# Patient Record
Sex: MALE | Race: BLACK OR AFRICAN AMERICAN | NOT HISPANIC OR LATINO | Employment: FULL TIME | URBAN - METROPOLITAN AREA
[De-identification: names, ages, dates, MRNs, and addresses within clinical notes are randomized per-mention and may not be internally consistent; named-entity substitution may affect disease eponyms.]

---

## 2020-01-11 ENCOUNTER — HOSPITAL ENCOUNTER (INPATIENT)
Facility: CLINIC | Age: 55
LOS: 4 days | Discharge: NURSING, REHAB OR LONG-TERM CARE | DRG: 064 | End: 2020-01-15
Attending: EMERGENCY MEDICINE | Admitting: HOSPITALIST
Payer: COMMERCIAL

## 2020-01-11 ENCOUNTER — APPOINTMENT (OUTPATIENT)
Dept: CT IMAGING | Facility: CLINIC | Age: 55
DRG: 064 | End: 2020-01-11
Attending: PHYSICIAN ASSISTANT
Payer: COMMERCIAL

## 2020-01-11 ENCOUNTER — APPOINTMENT (OUTPATIENT)
Dept: MRI IMAGING | Facility: CLINIC | Age: 55
DRG: 064 | End: 2020-01-11
Attending: PHYSICIAN ASSISTANT
Payer: COMMERCIAL

## 2020-01-11 ENCOUNTER — APPOINTMENT (OUTPATIENT)
Dept: GENERAL RADIOLOGY | Facility: CLINIC | Age: 55
DRG: 064 | End: 2020-01-11
Attending: HOSPITALIST
Payer: COMMERCIAL

## 2020-01-11 DIAGNOSIS — S06.320A INTRAPARENCHYMAL HEMATOMA OF BRAIN, LEFT, WITHOUT LOSS OF CONSCIOUSNESS, INITIAL ENCOUNTER (H): ICD-10-CM

## 2020-01-11 DIAGNOSIS — I61.8 OTHER NONTRAUMATIC INTRACEREBRAL HEMORRHAGE, UNSPECIFIED LATERALITY (H): Primary | ICD-10-CM

## 2020-01-11 PROBLEM — I61.9 ICH (INTRACEREBRAL HEMORRHAGE) (H): Status: ACTIVE | Noted: 2020-01-11

## 2020-01-11 LAB
ALBUMIN SERPL-MCNC: 4.4 G/DL (ref 3.4–5)
ALBUMIN UR-MCNC: NEGATIVE MG/DL
ALP SERPL-CCNC: 67 U/L (ref 40–150)
ALT SERPL W P-5'-P-CCNC: 41 U/L (ref 0–70)
ANION GAP SERPL CALCULATED.3IONS-SCNC: 2 MMOL/L (ref 3–14)
APPEARANCE UR: CLEAR
APTT PPP: 26 SEC (ref 22–37)
AST SERPL W P-5'-P-CCNC: 27 U/L (ref 0–45)
BASOPHILS # BLD AUTO: 0 10E9/L (ref 0–0.2)
BASOPHILS NFR BLD AUTO: 0.2 %
BILIRUB DIRECT SERPL-MCNC: 0.1 MG/DL (ref 0–0.2)
BILIRUB SERPL-MCNC: 0.5 MG/DL (ref 0.2–1.3)
BILIRUB UR QL STRIP: NEGATIVE
BUN SERPL-MCNC: 14 MG/DL (ref 7–30)
CALCIUM SERPL-MCNC: 9.2 MG/DL (ref 8.5–10.1)
CHLORIDE SERPL-SCNC: 104 MMOL/L (ref 94–109)
CO2 SERPL-SCNC: 33 MMOL/L (ref 20–32)
COLOR UR AUTO: ABNORMAL
CREAT SERPL-MCNC: 1.02 MG/DL (ref 0.66–1.25)
DIFFERENTIAL METHOD BLD: ABNORMAL
EOSINOPHIL # BLD AUTO: 0.1 10E9/L (ref 0–0.7)
EOSINOPHIL NFR BLD AUTO: 1.8 %
ERYTHROCYTE [DISTWIDTH] IN BLOOD BY AUTOMATED COUNT: 14.5 % (ref 10–15)
GFR SERPL CREATININE-BSD FRML MDRD: 82 ML/MIN/{1.73_M2}
GLUCOSE BLDC GLUCOMTR-MCNC: 106 MG/DL (ref 70–99)
GLUCOSE BLDC GLUCOMTR-MCNC: 152 MG/DL (ref 70–99)
GLUCOSE SERPL-MCNC: 107 MG/DL (ref 70–99)
GLUCOSE UR STRIP-MCNC: NEGATIVE MG/DL
HBA1C MFR BLD: 6 % (ref 0–5.6)
HCT VFR BLD AUTO: 48.6 % (ref 40–53)
HGB BLD-MCNC: 15.2 G/DL (ref 13.3–17.7)
HGB UR QL STRIP: NEGATIVE
IMM GRANULOCYTES # BLD: 0 10E9/L (ref 0–0.4)
IMM GRANULOCYTES NFR BLD: 0.2 %
INR PPP: 0.98 (ref 0.86–1.14)
KETONES UR STRIP-MCNC: NEGATIVE MG/DL
LEUKOCYTE ESTERASE UR QL STRIP: NEGATIVE
LYMPHOCYTES # BLD AUTO: 1 10E9/L (ref 0.8–5.3)
LYMPHOCYTES NFR BLD AUTO: 23.5 %
MCH RBC QN AUTO: 25.4 PG (ref 26.5–33)
MCHC RBC AUTO-ENTMCNC: 31.3 G/DL (ref 31.5–36.5)
MCV RBC AUTO: 81 FL (ref 78–100)
MONOCYTES # BLD AUTO: 0.4 10E9/L (ref 0–1.3)
MONOCYTES NFR BLD AUTO: 9.3 %
NEUTROPHILS # BLD AUTO: 2.9 10E9/L (ref 1.6–8.3)
NEUTROPHILS NFR BLD AUTO: 65 %
NITRATE UR QL: NEGATIVE
PH UR STRIP: 8 PH (ref 5–7)
PLATELET # BLD AUTO: 218 10E9/L (ref 150–450)
POTASSIUM SERPL-SCNC: 4.2 MMOL/L (ref 3.4–5.3)
PROT SERPL-MCNC: 7.3 G/DL (ref 6.8–8.8)
RBC # BLD AUTO: 5.98 10E12/L (ref 4.4–5.9)
RBC #/AREA URNS AUTO: <1 /HPF (ref 0–2)
SODIUM SERPL-SCNC: 139 MMOL/L (ref 133–144)
SOURCE: ABNORMAL
SP GR UR STRIP: 1.03 (ref 1–1.03)
UROBILINOGEN UR STRIP-MCNC: NORMAL MG/DL (ref 0–2)
WBC # BLD AUTO: 4.4 10E9/L (ref 4–11)
WBC #/AREA URNS AUTO: <1 /HPF (ref 0–5)

## 2020-01-11 PROCEDURE — 70496 CT ANGIOGRAPHY HEAD: CPT

## 2020-01-11 PROCEDURE — 70553 MRI BRAIN STEM W/O & W/DYE: CPT

## 2020-01-11 PROCEDURE — 00000146 ZZHCL STATISTIC GLUCOSE BY METER IP

## 2020-01-11 PROCEDURE — 80076 HEPATIC FUNCTION PANEL: CPT | Performed by: EMERGENCY MEDICINE

## 2020-01-11 PROCEDURE — 85025 COMPLETE CBC W/AUTO DIFF WBC: CPT | Performed by: EMERGENCY MEDICINE

## 2020-01-11 PROCEDURE — 96365 THER/PROPH/DIAG IV INF INIT: CPT | Mod: 59

## 2020-01-11 PROCEDURE — 25500064 ZZH RX 255 OP 636: Performed by: EMERGENCY MEDICINE

## 2020-01-11 PROCEDURE — 25000128 H RX IP 250 OP 636: Performed by: EMERGENCY MEDICINE

## 2020-01-11 PROCEDURE — 99223 1ST HOSP IP/OBS HIGH 75: CPT | Mod: AI | Performed by: HOSPITALIST

## 2020-01-11 PROCEDURE — 70450 CT HEAD/BRAIN W/O DYE: CPT

## 2020-01-11 PROCEDURE — 20000003 ZZH R&B ICU

## 2020-01-11 PROCEDURE — 96375 TX/PRO/DX INJ NEW DRUG ADDON: CPT

## 2020-01-11 PROCEDURE — 80048 BASIC METABOLIC PNL TOTAL CA: CPT | Performed by: EMERGENCY MEDICINE

## 2020-01-11 PROCEDURE — 99291 CRITICAL CARE FIRST HOUR: CPT | Mod: GC | Performed by: PSYCHIATRY & NEUROLOGY

## 2020-01-11 PROCEDURE — 85610 PROTHROMBIN TIME: CPT | Performed by: EMERGENCY MEDICINE

## 2020-01-11 PROCEDURE — 83036 HEMOGLOBIN GLYCOSYLATED A1C: CPT | Performed by: EMERGENCY MEDICINE

## 2020-01-11 PROCEDURE — 25000132 ZZH RX MED GY IP 250 OP 250 PS 637: Performed by: HOSPITALIST

## 2020-01-11 PROCEDURE — 25000125 ZZHC RX 250: Performed by: EMERGENCY MEDICINE

## 2020-01-11 PROCEDURE — 81001 URINALYSIS AUTO W/SCOPE: CPT | Performed by: HOSPITALIST

## 2020-01-11 PROCEDURE — 93005 ELECTROCARDIOGRAM TRACING: CPT

## 2020-01-11 PROCEDURE — 93010 ELECTROCARDIOGRAM REPORT: CPT | Performed by: INTERNAL MEDICINE

## 2020-01-11 PROCEDURE — 71045 X-RAY EXAM CHEST 1 VIEW: CPT

## 2020-01-11 PROCEDURE — 85730 THROMBOPLASTIN TIME PARTIAL: CPT | Performed by: EMERGENCY MEDICINE

## 2020-01-11 PROCEDURE — A9585 GADOBUTROL INJECTION: HCPCS | Performed by: EMERGENCY MEDICINE

## 2020-01-11 PROCEDURE — 99285 EMERGENCY DEPT VISIT HI MDM: CPT | Mod: 25

## 2020-01-11 RX ORDER — BISACODYL 10 MG
10 SUPPOSITORY, RECTAL RECTAL DAILY PRN
Status: DISCONTINUED | OUTPATIENT
Start: 2020-01-11 | End: 2020-01-15 | Stop reason: HOSPADM

## 2020-01-11 RX ORDER — DEXAMETHASONE SODIUM PHOSPHATE 10 MG/ML
10 INJECTION, SOLUTION INTRAMUSCULAR; INTRAVENOUS ONCE
Status: COMPLETED | OUTPATIENT
Start: 2020-01-11 | End: 2020-01-11

## 2020-01-11 RX ORDER — AMOXICILLIN 250 MG
2 CAPSULE ORAL 2 TIMES DAILY PRN
Status: DISCONTINUED | OUTPATIENT
Start: 2020-01-11 | End: 2020-01-15 | Stop reason: HOSPADM

## 2020-01-11 RX ORDER — POTASSIUM CHLORIDE 1.5 G/1.58G
20-40 POWDER, FOR SOLUTION ORAL
Status: DISCONTINUED | OUTPATIENT
Start: 2020-01-11 | End: 2020-01-12

## 2020-01-11 RX ORDER — ONDANSETRON 4 MG/1
4 TABLET, ORALLY DISINTEGRATING ORAL EVERY 6 HOURS PRN
Status: DISCONTINUED | OUTPATIENT
Start: 2020-01-11 | End: 2020-01-15 | Stop reason: HOSPADM

## 2020-01-11 RX ORDER — ONDANSETRON 2 MG/ML
4 INJECTION INTRAMUSCULAR; INTRAVENOUS EVERY 6 HOURS PRN
Status: DISCONTINUED | OUTPATIENT
Start: 2020-01-11 | End: 2020-01-15 | Stop reason: HOSPADM

## 2020-01-11 RX ORDER — POTASSIUM CHLORIDE 1500 MG/1
20-40 TABLET, EXTENDED RELEASE ORAL
Status: DISCONTINUED | OUTPATIENT
Start: 2020-01-11 | End: 2020-01-12

## 2020-01-11 RX ORDER — POTASSIUM CHLORIDE 29.8 MG/ML
20 INJECTION INTRAVENOUS
Status: DISCONTINUED | OUTPATIENT
Start: 2020-01-11 | End: 2020-01-12

## 2020-01-11 RX ORDER — AMOXICILLIN 250 MG
1 CAPSULE ORAL 2 TIMES DAILY PRN
Status: DISCONTINUED | OUTPATIENT
Start: 2020-01-11 | End: 2020-01-15 | Stop reason: HOSPADM

## 2020-01-11 RX ORDER — PROCHLORPERAZINE MALEATE 10 MG
10 TABLET ORAL EVERY 6 HOURS PRN
Status: DISCONTINUED | OUTPATIENT
Start: 2020-01-11 | End: 2020-01-15 | Stop reason: HOSPADM

## 2020-01-11 RX ORDER — POTASSIUM CHLORIDE 7.45 MG/ML
10 INJECTION INTRAVENOUS
Status: DISCONTINUED | OUTPATIENT
Start: 2020-01-11 | End: 2020-01-12

## 2020-01-11 RX ORDER — GADOBUTROL 604.72 MG/ML
8 INJECTION INTRAVENOUS ONCE
Status: DISCONTINUED | OUTPATIENT
Start: 2020-01-11 | End: 2020-01-11

## 2020-01-11 RX ORDER — MAGNESIUM SULFATE HEPTAHYDRATE 40 MG/ML
4 INJECTION, SOLUTION INTRAVENOUS EVERY 4 HOURS PRN
Status: DISCONTINUED | OUTPATIENT
Start: 2020-01-11 | End: 2020-01-15 | Stop reason: HOSPADM

## 2020-01-11 RX ORDER — NALOXONE HYDROCHLORIDE 0.4 MG/ML
.1-.4 INJECTION, SOLUTION INTRAMUSCULAR; INTRAVENOUS; SUBCUTANEOUS
Status: DISCONTINUED | OUTPATIENT
Start: 2020-01-11 | End: 2020-01-15 | Stop reason: HOSPADM

## 2020-01-11 RX ORDER — POTASSIUM CL/LIDO/0.9 % NACL 10MEQ/0.1L
10 INTRAVENOUS SOLUTION, PIGGYBACK (ML) INTRAVENOUS
Status: DISCONTINUED | OUTPATIENT
Start: 2020-01-11 | End: 2020-01-12

## 2020-01-11 RX ORDER — PROCHLORPERAZINE 25 MG
25 SUPPOSITORY, RECTAL RECTAL EVERY 12 HOURS PRN
Status: DISCONTINUED | OUTPATIENT
Start: 2020-01-11 | End: 2020-01-15 | Stop reason: HOSPADM

## 2020-01-11 RX ORDER — GADOBUTROL 604.72 MG/ML
10 INJECTION INTRAVENOUS ONCE
Status: COMPLETED | OUTPATIENT
Start: 2020-01-11 | End: 2020-01-11

## 2020-01-11 RX ORDER — LATANOPROST 50 UG/ML
1 SOLUTION/ DROPS OPHTHALMIC DAILY
Status: ON HOLD | COMMUNITY
End: 2020-01-20

## 2020-01-11 RX ORDER — METOCLOPRAMIDE 10 MG/1
10 TABLET ORAL EVERY 6 HOURS PRN
Status: DISCONTINUED | OUTPATIENT
Start: 2020-01-11 | End: 2020-01-15 | Stop reason: HOSPADM

## 2020-01-11 RX ORDER — LORAZEPAM 2 MG/ML
0.5 INJECTION INTRAMUSCULAR ONCE
Status: DISCONTINUED | OUTPATIENT
Start: 2020-01-11 | End: 2020-01-11

## 2020-01-11 RX ORDER — METOCLOPRAMIDE HYDROCHLORIDE 5 MG/ML
10 INJECTION INTRAMUSCULAR; INTRAVENOUS EVERY 6 HOURS PRN
Status: DISCONTINUED | OUTPATIENT
Start: 2020-01-11 | End: 2020-01-15 | Stop reason: HOSPADM

## 2020-01-11 RX ORDER — IOPAMIDOL 755 MG/ML
70 INJECTION, SOLUTION INTRAVASCULAR ONCE
Status: COMPLETED | OUTPATIENT
Start: 2020-01-11 | End: 2020-01-11

## 2020-01-11 RX ORDER — LATANOPROST 50 UG/ML
1 SOLUTION/ DROPS OPHTHALMIC DAILY
Status: DISCONTINUED | OUTPATIENT
Start: 2020-01-11 | End: 2020-01-15 | Stop reason: HOSPADM

## 2020-01-11 RX ADMIN — GADOBUTROL 10 ML: 604.72 INJECTION INTRAVENOUS at 12:09

## 2020-01-11 RX ADMIN — NICARDIPINE HYDROCHLORIDE 2.52 MG/HR: 0.2 INJECTION, SOLUTION INTRAVENOUS at 14:47

## 2020-01-11 RX ADMIN — IOPAMIDOL 70 ML: 755 INJECTION, SOLUTION INTRAVENOUS at 13:53

## 2020-01-11 RX ADMIN — SODIUM CHLORIDE 100 ML: 9 INJECTION, SOLUTION INTRAVENOUS at 13:53

## 2020-01-11 RX ADMIN — DEXAMETHASONE SODIUM PHOSPHATE 10 MG: 10 INJECTION, SOLUTION INTRAMUSCULAR; INTRAVENOUS at 14:28

## 2020-01-11 RX ADMIN — LATANOPROST 1 DROP: 50 SOLUTION/ DROPS OPHTHALMIC at 22:55

## 2020-01-11 SDOH — HEALTH STABILITY: MENTAL HEALTH: HOW OFTEN DO YOU HAVE A DRINK CONTAINING ALCOHOL?: NEVER

## 2020-01-11 ASSESSMENT — ENCOUNTER SYMPTOMS
BACK PAIN: 0
MYALGIAS: 1
WEAKNESS: 1
NUMBNESS: 1

## 2020-01-11 ASSESSMENT — MIFFLIN-ST. JEOR
SCORE: 1656.13
SCORE: 1696.28

## 2020-01-11 ASSESSMENT — ACTIVITIES OF DAILY LIVING (ADL): ADLS_ACUITY_SCORE: 12

## 2020-01-11 NOTE — PHARMACY-ADMISSION MEDICATION HISTORY
Pharmacy Medication History  Admission medication history interview status for the 1/11/2020  admission is complete. See EPIC admission navigator for prior to admission medications     Medication history sources: Patient  Medication history source reliability: Good  Adherence assessment: Good    Significant changes made to the medication list:  Added:  - latanoprost eye drop      Additional medication history information:   None    Medication reconciliation completed by provider prior to medication history? No    Time spent in this activity: 5 minutes      Prior to Admission medications    Medication Sig Last Dose Taking? Auth Provider   latanoprost (XALATAN) 0.005 % ophthalmic solution Place 1 drop into both eyes daily 1/9/2020 at PM Yes Unknown, Entered By History

## 2020-01-11 NOTE — CONSULTS
[facility]    Stroke Consult Note    Reason for Consult:  Stroke question    Chief Complaint: Extremity Weakness       HPI  54M hx of pre-DM and HLD who p/w RLE weakness/numbness. The patient went to bed normal last night and woke up with inability to move his RLE. The entire RLE also felt numb. His RLE weakness is markedly improved however he notes the RLE still feels numb. He denies: incontinence, shooting pains, leg pain of any kind, electric sensations, groin numbness. He has no prior hx of stroke or TIA. Does not take daily aspirin.   _____________________________________________________    Past Medical History   No past medical history on file.  Past Surgical History   No past surgical history on file.  Medications   Home Meds  Prior to Admission medications    Not on File       Scheduled Meds    LORazepam  0.5 mg Intravenous Once       Infusion Meds      PRN Meds      Allergies   Allergies no known allergies  Family History   No family history on file.  Social History   Social History     Tobacco Use     Smoking status: Not on file   Substance Use Topics     Alcohol use: Not on file     Drug use: Not on file       Review of Systems   The 10 point Review of Systems is negative other than noted in the HPI or here.        PHYSICAL EXAMINATION   Temp:  [98.4  F (36.9  C)] 98.4  F (36.9  C)  Heart Rate:  [59] 59  Resp:  [18] 18  BP: (160)/(94) 160/94  SpO2:  [100 %] 100 %      Mental status: AOx4, speech fluent  Cranial nerves: EOMI, VFF, face symmetric, equal to LT  Motor: 4/5 R hip flexor, o/w 5/5 diffusely  Sensory: absent on R leg.   Reflexes: 2+ with adductor spread BL LE. No clonus. Toes mute.   Coordination: FTN intact. HTS impaired in RLE  Gait: defer      Dysphagia Screen  Passed screening, no dysarthria - Regular Diet with thin liquids  01/11/2020 1200    Stroke Scales    NIHSS  Interval baseline (01/11/20 1201)   Interval Comments     1a. Level of Consciousness 0-->Alert, keenly  responsive   1b. LOC Questions 0-->Answers both questions correctly   1c. LOC Commands 0-->Performs both tasks correctly   2.   Best Gaze 0-->Normal   3.   Visual 0-->No visual loss   4.   Facial Palsy 0-->Normal symmetrical movements   5a. Motor Arm, Left 0-->No drift, limb holds 90 (or 45) degrees for full 10 secs   5b. Motor Arm, Right 0-->No drift, limb holds 90 (or 45) degrees for full 10 secs   6a. Motor Leg, Left 1-->Drift, leg falls by the end of the 5-sec period but does not hit bed   6b. Motor Leg, right 0-->No drift, leg holds 30 degree position for full 5 secs   7.   Limb Ataxia 1-->Present in one limb   8.   Sensory 1-->Mild-to-moderate sensory loss, patient feels pinprick is less sharp or is dull on the affected side, or there is a loss of superficial pain with pinprick, but patient is aware of being touched   9.   Best Language 0-->No aphasia, normal   10. Dysarthria 0-->Normal   11. Extinction and Inattention  0-->No abnormality   Total 3 (01/11/20 1201)       Imaging  I personally reviewed all imaging; relevant findings per HPI.    Labs CBC  Recent Labs   Lab 01/11/20  1057   WBC 4.4   RBC 5.98*   HGB 15.2   HCT 48.6        Basic Metabolic Panel   Recent Labs   Lab 01/11/20  1057      POTASSIUM 4.2   CHLORIDE 104   CO2 33*   BUN 14   CR 1.02   *   JULIA 9.2     Liver Panel  No lab results found.  INR  Recent Labs   Lab Test 01/11/20  1057   INR 0.98      Lipid ProfileNo lab results found.  A1CNo lab results found.  Troponin Marcio results for input(s): TROPI in the last 168 hours.    =================================================================    ASSESSMENT/PLAN: 54M hx of pre-DM and HLD who p/w RLE weakness/numbness.     ## RLE weakness and numbness: weakness is rapidly resolving by the time of ED visit, however his RLE is still very numb. The pattern of his numbness is not in a clear dermatomal pattern that we typically see with peripheral nerve injury. He also lacks other  "signs seen with nerve injury such as shooting sensations, pain, etc. He is out of the window for tPA.   --MRI brain with and without contrast in ED to triage question of stroke  --stroke team to f/u on results    ADDENDUM:  MRI and CT c/w ICH of the superior L frontal lobe. Unclear etiology at this time. Causes include: HTN (less likely), cavernoma, tumor, endocarditis, others. He has no significant HTN history. CTA showed no underlying vascular malformation.  --admit to ICU  --SBP <140. rec nicardipine for now  --repeat CT head in 6 hours from last scan.  --video EEG  --no antiepileptics indicated at this time.   --will need cerebral angiogram, probably Monday  --if no etiology of bleed found will need repeat MRI scan outpatient after blood has resolved.   --neuro ICU to continue follow    =================================================================    sIaias Orr  Vascular Neurology Fellow    01/11/2020 12:04 PM  Text Page (8am-7pm)  To page stroke neurology after hours or on a subsequent day, click here: [facility]  Choose \"On Call\" tab at top, then search dropdown box for \"Neurology Adult\" & press Enter, look for Neuro ICU/Stroke      Stroke Code / Stroke Consult Data Data         "

## 2020-01-11 NOTE — ED PROVIDER NOTES
History     Chief Complaint:  Extremity Weakness    HPI   Good Petty is a 54 year old male who presents with wife from [facility] for evaluation of acute onset right leg weakness and numbness, associated with right leg myalgias, that began this morning around 0725. The patient states he went to bed baseline yesterday and got up around 0400 this morning to use the bathroom at baseline as well. He woke up around 0725 and he had difficulty moving his right leg, which he initially attributed to sleeping on the couch. He tried moving his leg, but he could not move it. He also endorses a tingling sensation in his right leg and a shooting pain down his right leg. The patient presented to [facility] for evaluation, who recommended he present for further evaluation.    Here, the patient's wife states she has not noticed any neurological symptoms, including speech difficulty or altered mental status. He denies any back pain.     Cardiac/PE/DVT Risk Factors:  History of hypertension - No  History of hyperlipidemia - Yes  History of diabetes - No  History of smoking - No  Personal history of PE/DVT - No  Family history of PE/DVT - No  Family history of heart complications - No  Recent travel - No  Recent surgery - No  Other immobilizations - No  Cancer - No     Allergies:  No Known Drug Allergies      Medications:    The patient is not currently taking any prescribed medications.      Past Medical History:    Anxiety  Hyperlipidemia  Adhesive capsulitis of right shoulder  Degenerative tear of glenoid labrum of right shoulder     Past Surgical History:    Vasectomy     Family History:    The patient denies any relevant family medical history.     Social History:  The patient was accompanied to the ED by wife.  Smoking Status: Never  Smokeless Tobacco: Never  Alcohol Use: No  Drug Use: No   Marital Status:   [2]     Review of Systems   Musculoskeletal: Positive for myalgias.  "Negative for back pain.   Neurological: Positive for weakness and numbness.   All other systems reviewed and are negative.      Physical Exam     Patient Vitals for the past 24 hrs:   BP Temp Temp src Pulse Heart Rate Resp SpO2 Height Weight   01/11/20 1520 131/82 -- -- -- -- -- 98 % -- --   01/11/20 1500 (!) 139/97 -- -- 57 -- -- 99 % -- --   01/11/20 1450 (!) 148/94 -- -- -- -- -- 100 % -- --   01/11/20 1430 (!) 146/90 -- -- 55 -- -- -- -- --   01/11/20 1410 (!) 151/88 -- -- -- -- -- -- -- --   01/11/20 1055 (!) 160/94 98.4  F (36.9  C) Oral -- 59 18 100 % 1.803 m (5' 11\") 83.9 kg (185 lb)      Physical Exam  Constitutional: Alert, attentive, GCS 15  HENT:    Nose: Nose normal.    Mouth/Throat: Oropharynx is clear, mucous membranes are moist  Eyes: EOM are normal, anicteric, conjugate gaze  CV: regular rate and rhythm; no murmurs  Chest: Effort normal and breath sounds clear without wheezing or rales, symmetric bilaterally   GI:  non tender. No distension. No guarding or rebound.    MSK: No LE edema, no tenderness to palpation of BLE.  Neurological:   GCS 15; A/Ox3; Cranial nerves 2-12 intact;   5/5 strength throughout the upper and lower extremities; Drift of RLE, none in RUE  Diffuse decreased sensation to light touch and pain. No hyperreflexia.   2+ DTRs to the bilateral upper and lower extremities (biceps, BRs, patellar, achilles);   normal fine motor coordination intact bilaterally;   Skin: Skin is warm and dry.     National Institutes of Health Stroke Scale  Exam Interval: Baseline   Score    Level of consciousness: (0)   Alert, keenly responsive    LOC questions: (0)   Answers both questions correctly    LOC commands: (0)   Performs both tasks correctly    Best gaze: (0)   Normal    Visual: (0)   No visual loss    Facial palsy: (0)   Normal symmetrical movements    Motor arm (left): (0)   No drift    Motor arm (right): (0)   No drift    Motor leg (left): (0)   No drift    Motor leg (right): (1)   Drift    " Limb ataxia: (0)   Absent    Sensory: (1)   Mild to moderate sensory loss    Best language: (0)   Normal- no aphasia    Dysarthria: (0)   Normal    Extinction and inattention: (0)   No abnormality        Total Score:  2       Emergency Department Course   Imaging:  Radiology findings were communicated with the patient who voiced understanding of the findings.    MR Brain w/o & w Contrast   IMPRESSION:  1. Mass within the left paracentral lobule measuring up to 3.2 cm,  most consistent with intraparenchymal hematoma acute/subacute on  chronic hemorrhage. Mild surrounding vasogenic edema. Underlying mass  (e.g..cavernoma) cannot be excluded. Recommend follow-up MRI.  2. Smaller area of susceptibility related signal loss within the more  lateral left postcentral gyrus, suggestive of cavernoma.  Reading per radiology.     CT Head w/o Contrast  IMPRESSION: Acute/subacute intraparenchymal hematoma within the left  paracentral lobule with mild surrounding vasogenic edema. Recommend  continued follow-up.  Reading per radiology.     CTA Head Neck with Contrast  IMPRESSION:   1. Punctate (1 mm) area of enhancement along the margin of the  intraparenchymal hematoma centered within the left paracentral lobule.  Differential diagnosis includes normal displaced cortical vein  (favored) or less likely punctate area of contrast  extravasation/puddling. Tiny underlying vascular abnormality cannot be  excluded.  2. Mild narrowing of the right internal carotid artery at the  bifurcation related to eccentrically located noncalcified soft tissue  which may represent noncalcified plaque or adherent thrombus.  Reading per radiology.     Laboratory:  Laboratory findings were communicated with the patient who voiced understanding of the findings.    CBC: RBC 5.98 (H) o/w WNL (WBC 4.4, )   BMP: Carbon dioxide 33 (H), Anion gap 2 (L), Glucose 107 (H) o/w WNL (Creatinine 1.02)   INR: 0.98   PTT: 26   Hepatic panel: AWNL  Hemoglobin A1c:  6.0 (H)    Interventions:  1428 Decadron 10 mg IV  1429 Ativan 0.5 mg IV  1447 Cardene 40 mg infusion IV     Emergency Department Course:  Nursing notes and vitals reviewed.  The patient was sent for a MR Brain w/o & w Contrast, CT Head w/o Contrast, CTA Head Neck with Contrast while in the emergency department, results above.    IV was inserted and blood was drawn for laboratory testing, results above.     (1054)   I performed an exam of the patient as documented above. History obtained from patient and wife.    (1136)   I spoke with Kerry Anaya for Dr. Cuevas of the Neurology service regarding patient's presentation, findings, and plan of care.      (1142)   I spoke with Isaias, Stroke Fellow of the Neurology service regarding patient's presentation, findings, and plan of care.      (1143)   I spoke with Dr. Cuevas of the Neurology service regarding patient's presentation, findings, and plan of care.      (1222)   I spoke with Dr. Cuevas of the Neurology service regarding patient's presentation, findings, and plan of care. He states he is not having a stroke.     (1359)   I spoke with Dr. Brewster of the Neurosurgery service regarding patient's presentation, findings, and plan of care.      (1408)   I rechecked the patient and discussed results and plan of care.     (1415)   I spoke with Isaias, Stroke Fellow of the Neurology service regarding patient's presentation, findings, and plan of care.     (1421)   I spoke with Dr. Mcbride of the Radiology service regarding patient's presentation, findings, and plan of care.      (1427)   I spoke with Dr. Mclain of the Hospitalist service regarding patient's presentation, findings, and plan of care.      Findings and plan explained to the Patient who consents to admission. Discussed the patient with Dr. Mclain, who will admit the patient to a inpatient bed for further monitoring, evaluation, and treatment.     I personally reviewed the laboratory results with the Patient and  answered all related questions prior to admit.    Impression & Plan    Medical Decision Makin-year-old male with no significant past medical history other than hypertension presenting for evaluation of improving right lower extremity clumsiness, weakness and numbness of approximately 3 and half hours duration prior to arrival.  On arrival he has an NIH stroke scale of 2 with rapidly improving symptoms and no other focal neurologic deficits suggestive against ischemic stroke and in favor of TIA versus possible radiculopathy however given his drift and circumferential paresthesias that did not follow-up dermatomal pattern, I did like to consult stroke neurology but did not activate code stroke as he is not a TPA candidate given improving symptoms alone a stroke score.  They recommended stat MRI of the brain and unfortunately this showed no evidence of stroke but did show probable acute on chronic intraparenchymal hemorrhage.  CTA did not show any clear vascular abnormality.  He did have some vasogenic edema for which she was given Decadron, he was placed on a nicardipine drip for blood pressure control below 140 however no indication for platelets or blood products.  Will admit to intensive care unit for close neurologic monitoring continued followed by neuro critical care and cerebral angiography.    Critical Care time was 45 minutes for this patient excluding procedures.     Diagnosis:    ICD-10-CM    1. Intraparenchymal hematoma of brain, left, without loss of consciousness, initial encounter (H) S06.350A       Disposition:   Admission to ICU    Parker Harvey MD  [employer]  5:22 PM 20     Scribe Disclosure:  HAYDEE Amrikarti Zapien, am serving as a scribe at 10:59 AM on 2020 to document services personally performed by Parker Harvey MD, based on my observations and the provider's statements to me.  2020    EMERGENCY DEPARTMENT       Parker Harvey,  MD  01/11/20 1817

## 2020-01-11 NOTE — H&P
[facility]    History and Physical - Hospitalist Service       Date of Admission:  1/11/2020    Assessment & Plan   Good Petty is a 54 year old male who is essentially healthy and had the sudden onset of right leg numbness and weakness at 7:25 AM.  The weakness has resolved but he has persistent numbness in the leg.  CT and MRI show an intracranial hemorrhage in the left paracentral lobule.  The etiology of this is unclear.     Mass within the left paracentral lobule measuring up to 3.2 cm,  most consistent with intraparenchymal hematoma acute/subacute on  chronic hemorrhage  Right lower extremity paresthesias  Elevated blood pressure     Admit to the intensive care unit    Use IV nicardipine as needed to keep systolic blood pressure less than 140    Transthoracic echocardiogram     Repeat CT head in 6 hours    Cerebral angiogram recommended on Monday    Neuro checks    Physcial therapy consult      Diet: advance diet as tolerated   DVT Prophylaxis: Pneumatic Compression Devices  [place] Catheter: not present  Code Status: Full     Disposition Plan   Expected discharge: 2 - 3 days, recommended to prior living arrangement once neurologic status is stable .  Entered: Willem Mclain MD 01/11/2020, 3:22 PM     The patient's care was discussed with the Patient and Patient's Family.    Willem Mclain MD  [facility]    ______________________________________________________________________    Chief Complaint   Right lower extremity numbness     History is obtained from the patient, electronic health record and emergency department physician    History of Present Illness   Good Petty is a 54 year old left handed male who is essentially healthy.  He was told he has prediabetes and an elevated cholesterol.  He has no history of hypertension, stroke, heart disease or tobacco use.  He does not use alcohol.  He says he is never been admitted to the hospital.  He takes no  prescription medications.  He does not use illicit drugs.  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  He says that he has been feeling totally well lately and felt well when he went to bed last night.  He says that it at exactly 7:25 AM he developed pins-and-needles as well as less heaviness in his right leg from his buttock down to the tips of his toes.  He had some difficulty moving the leg.  He did not have any headache, dizziness, change in vision, difficulty speaking or understanding.  He had no difficulty swallowing or moving his upper extremities.  He had no difficulty or numbness in the left lower extremity.  He had no back pain.  He went to [facility] where his vital signs were stable and he was normotensive.  He was sent to the [facility] emergency department to be evaluated.  In the emergency department his blood pressure was 160/94, temperature 98.4  F, heart rate 59, respiratory rate 18, ox saturation 100% and weight 185 pounds.  He was fully awake alert and oriented.  He had normal strength in all 4 extremities and his cranial nerves were intact.  It decreased sensation to light touch and pain in the right leg.  Heart and lung exam are unremarkable.  CT of the head without contrast showed an acute to subacute intraparenchymal hematoma in the left paracentral lobule with mild surrounding vasogenic edema.  MRI of the brain without and with contrast showed a mass within the left paracentral lobule measuring up to 3.2 cm consistent with an intraparenchymal hematoma which is acute to subacute.  There is mild surrounding vasogenic edema.  Labs were unremarkable.  The patient was seen by the stroke neurologist.  He is going to be admitted to the intensive care unit for blood pressure control and further investigation.    Review of Systems    The 10 point Review of Systems is negative other than noted in the HPI or here.     Past Medical  History    I have reviewed this patient's medical history and updated it with pertinent information if needed.   Past Medical History:   Diagnosis Date     Anxiety      Prediabetes      Shoulder capsulitis, right        Past Surgical History   I have reviewed this patient's surgical history and updated it with pertinent information if needed.  Past Surgical History:   Procedure Laterality Date     HC TOOTH EXTRACTION W/FORCEP       VASECTOMY         Social History   I have reviewed this patient's social history and updated it with pertinent information if needed.  Social History     Tobacco Use     Smoking status: Never Smoker   Substance Use Topics     Alcohol use: Never     Frequency: Never     Drug use: Not on file       Family History   I have reviewed this patient's family history and updated it with pertinent information if needed.   Family History   Problem Relation Age of Onset     Cerebrovascular Disease Mother      Cerebrovascular Disease Maternal Grandmother        Prior to Admission Medications   Prior to Admission Medications   Prescriptions Last Dose Informant Patient Reported? Taking?   latanoprost (XALATAN) 0.005 % ophthalmic solution 1/9/2020 at PM Self Yes Yes   Sig: Place 1 drop into both eyes daily      Facility-Administered Medications: None     Allergies   Allergies no known allergies    Physical Exam   Vital Signs: Temp: 98.4  F (36.9  C) Temp src: Oral BP: (!) 139/97 Pulse: 57 Heart Rate: 59 Resp: 18 SpO2: 99 % O2 Device: None (Room air)    Weight: 185 lbs 0 oz    Constitutional: awake, alert, cooperative, no apparent distress, and appears stated age  Eyes: Lids and lashes normal, pupils equal, round and reactive to light, extra ocular muscles intact, sclera clear, conjunctiva normal  ENT: Normocephalic, without obvious abnormality, atraumatic, sinuses nontender on palpation, external ears without lesions, oral pharynx with moist mucous membranes, tonsils without erythema or exudates, gums  normal   Hematologic / Lymphatic: no cervical lymphadenopathy  Respiratory: No increased work of breathing, good air exchange, clear to auscultation bilaterally, no crackles or wheezing  Cardiovascular:  regular rate and rhythm, normal S1 and S2, no S3 or S4, and no murmur noted  GI:  normal bowel sounds, soft, non-distended, non-tender, no masses palpated  Skin: no rash or jaundice  Musculoskeletal: There is no redness, warmth, or swelling of the joints.  Full range of motion noted.  Motor strength is 5 out of 5 all extremities bilaterally.  Tone is normal.  Neurologic: Awake, alert, oriented to name, place and time.  Cranial nerves II-XII are grossly intact.  Motor is 5 out of 5 bilaterally.  Cerebellar finger to nose intact  Neuropsychiatric: General: normal, calm and normal eye contact    Data   Data reviewed today: I reviewed all medications, new labs and imaging results over the last 24 hours. I personally reviewed CT which shows a left cerebral intracerebral hemorrhage   Chest X-ray and EKG have been ordered.    Recent Labs   Lab 01/11/20  1057   WBC 4.4   HGB 15.2   MCV 81      INR 0.98      POTASSIUM 4.2   CHLORIDE 104   CO2 33*   BUN 14   CR 1.02   ANIONGAP 2*   JULIA 9.2   *     Recent Results (from the past 24 hour(s))   MR Brain w/o & w Contrast    Narrative    MRI BRAIN WITHOUT AND WITH CONTRAST  1/11/2020 1:03 PM    HISTORY:  Right leg weakness, numbness, pain, rule out stroke.     TECHNIQUE:  Multiplanar, multisequence MRI of the brain without and  with 10 mL Gadavist.    COMPARISON: None.    FINDINGS:  A 2.1 x 3.2 x 2.1 cm (AP x TR x SI) centrally T2  hyperintense, T1 hyperintense peripherally T1 hypointense, T2  hypointense lesion is present centered within the left paracentral  lobule. There is corresponding peripheral susceptibility related  signal loss. A smaller punctate area of susceptibility related signal  loss is present within the left postcentral gyrus. Parenchyma  is  otherwise unremarkable. Major intracranial flow voids are maintained.  The visualized calvarium, tympanic cavities, mastoid cavities, and  extra cranial soft tissues are unremarkable. Mild polypoid maxillary  sinus mucosal thickening is present.      Impression    IMPRESSION:  1. Mass within the left paracentral lobule measuring up to 3.2 cm,  most consistent with intraparenchymal hematoma acute/subacute on  chronic hemorrhage. Mild surrounding vasogenic edema. Underlying mass  (e.g..cavernoma) cannot be excluded. Recommend follow-up MRI.  2. Smaller area of susceptibility related signal loss within the more  lateral left postcentral gyrus, suggestive of cavernoma.    URVASHI HILL MD   CT Head w/o Contrast    Narrative    CT SCAN OF THE HEAD WITHOUT CONTRAST   1/11/2020 2:06 PM     HISTORY: Abnormal brain MRI; rule out hematoma.    TECHNIQUE:  Axial images of the head and coronal reformations without  IV contrast material. Radiation dose for this scan was reduced using  automated exposure control, adjustment of the mA and/or kV according  to patient size, or iterative reconstruction technique.    COMPARISON: Head MRI 1/11/2020.    FINDINGS:  An ovoid area of hyperattenuation is present within the  left paracentral lobule measuring 1.5 x 2.8 x 2.2 cm by CT (AP x TR x  SI) corresponding to same day MRI findings. Mild surrounding visited  edema is present. Parenchyma is otherwise unremarkable. The size  calvarium, tympanic cavities, and mastoid cavities are unremarkable.      Impression    IMPRESSION: Acute/subacute intraparenchymal hematoma within the left  paracentral lobule with mild surrounding vasogenic edema. Recommend  continued follow-up.    URVASHI HILL MD   CTA Head Neck with Contrast    Narrative    CT ANGIOGRAM OF THE HEAD AND NECK WITH CONTRAST  1/11/2020 2:07 PM     HISTORY: Right leg weakness.    TECHNIQUE:  CT angiography with an injection of 70 mL [id number]-370 IV  with scans through the head  and neck. Images were transferred to a  separate 3-D workstation where multiplanar reformations and 3-D images  were created. Estimates of carotid stenoses are made relative to the  distal internal carotid artery diameters except as noted. Radiation  dose for this scan was reduced using automated exposure control,  adjustment of the mA and/or kV according to patient size, or iterative  reconstruction technique.    COMPARISON: None.     CT ANGIOGRAM HEAD FINDINGS:  The left vertebral artery predominately  terminates in posterior inferior cerebellar artery. The right  vertebral artery, basilar artery, and posterior cerebral arteries are  patent. The internal carotid arteries, intracerebral arteries, and  middle cerebral arteries are patent. No evidence of aneurysm or  vascular malformation. A questionable punctate area of enhancement is  present within or along the margin of the hematoma centered within the  left paracentral lobule, probably representing a displaced venous  structure (series 9 image 71).    CT ANGIOGRAM NECK FINDINGS: A three-vessel aortic arch is present. The  bilateral common carotid, internal carotid, external carotid, and  vertebral arteries are patent. There is slight irregularity at the  right carotid bifurcation with mild narrowing of the right internal  carotid artery at the origin related to eccentric noncalcified  material (series 10 image 73).     Mild lower cervical spine degenerative change is present. No  periapical dental abscesses are identified.      Impression    IMPRESSION:   1. Punctate (1 mm) area of enhancement along the margin of the  intraparenchymal hematoma centered within the left paracentral lobule.  Differential diagnosis includes normal displaced cortical vein  (favored) or less likely punctate area of contrast  extravasation/puddling. Tiny underlying vascular abnormality cannot be  excluded.  2. Mild narrowing of the right internal carotid artery at the  bifurcation  related to eccentrically located noncalcified soft tissue  which may represent noncalcified plaque or adherent thrombus.    Findings were discussed by phone between Dr. Mcbride and Dr. Harvey  2:21 PM on 1/11/2020.    URVASHI MCBRIDE MD

## 2020-01-12 ENCOUNTER — APPOINTMENT (OUTPATIENT)
Dept: CARDIOLOGY | Facility: CLINIC | Age: 55
DRG: 064 | End: 2020-01-12
Attending: HOSPITALIST
Payer: COMMERCIAL

## 2020-01-12 ENCOUNTER — APPOINTMENT (OUTPATIENT)
Dept: CT IMAGING | Facility: CLINIC | Age: 55
DRG: 064 | End: 2020-01-12
Payer: COMMERCIAL

## 2020-01-12 LAB
APTT PPP: 28 SEC (ref 22–37)
CHOLEST SERPL-MCNC: 203 MG/DL
GLUCOSE BLDC GLUCOMTR-MCNC: 108 MG/DL (ref 70–99)
GLUCOSE BLDC GLUCOMTR-MCNC: 112 MG/DL (ref 70–99)
GLUCOSE BLDC GLUCOMTR-MCNC: 115 MG/DL (ref 70–99)
GLUCOSE BLDC GLUCOMTR-MCNC: 143 MG/DL (ref 70–99)
HDLC SERPL-MCNC: 63 MG/DL
INR PPP: 1.05 (ref 0.86–1.14)
LDLC SERPL CALC-MCNC: 131 MG/DL
NONHDLC SERPL-MCNC: 140 MG/DL
TRIGL SERPL-MCNC: 45 MG/DL

## 2020-01-12 PROCEDURE — 80061 LIPID PANEL: CPT | Performed by: HOSPITALIST

## 2020-01-12 PROCEDURE — 99233 SBSQ HOSP IP/OBS HIGH 50: CPT | Performed by: INTERNAL MEDICINE

## 2020-01-12 PROCEDURE — 40000061 ZZH STATISTIC EEG TIME EA 10 MIN

## 2020-01-12 PROCEDURE — 85610 PROTHROMBIN TIME: CPT | Performed by: HOSPITALIST

## 2020-01-12 PROCEDURE — 95714 VEEG EA 12-26 HR UNMNTR: CPT

## 2020-01-12 PROCEDURE — 36415 COLL VENOUS BLD VENIPUNCTURE: CPT | Performed by: HOSPITALIST

## 2020-01-12 PROCEDURE — 25000128 H RX IP 250 OP 636: Performed by: INTERNAL MEDICINE

## 2020-01-12 PROCEDURE — 12000000 ZZH R&B MED SURG/OB

## 2020-01-12 PROCEDURE — 93306 TTE W/DOPPLER COMPLETE: CPT | Mod: 26 | Performed by: INTERNAL MEDICINE

## 2020-01-12 PROCEDURE — 00000146 ZZHCL STATISTIC GLUCOSE BY METER IP

## 2020-01-12 PROCEDURE — 93306 TTE W/DOPPLER COMPLETE: CPT

## 2020-01-12 PROCEDURE — 99291 CRITICAL CARE FIRST HOUR: CPT | Mod: GC | Performed by: PSYCHIATRY & NEUROLOGY

## 2020-01-12 PROCEDURE — 85730 THROMBOPLASTIN TIME PARTIAL: CPT | Performed by: HOSPITALIST

## 2020-01-12 PROCEDURE — 70450 CT HEAD/BRAIN W/O DYE: CPT

## 2020-01-12 PROCEDURE — 25000132 ZZH RX MED GY IP 250 OP 250 PS 637: Performed by: INTERNAL MEDICINE

## 2020-01-12 PROCEDURE — 95700 EEG CONT REC W/VID EEG TECH: CPT

## 2020-01-12 RX ORDER — POTASSIUM CL/LIDO/0.9 % NACL 10MEQ/0.1L
10 INTRAVENOUS SOLUTION, PIGGYBACK (ML) INTRAVENOUS
Status: DISCONTINUED | OUTPATIENT
Start: 2020-01-12 | End: 2020-01-15 | Stop reason: HOSPADM

## 2020-01-12 RX ORDER — POTASSIUM CHLORIDE 1500 MG/1
20-40 TABLET, EXTENDED RELEASE ORAL
Status: DISCONTINUED | OUTPATIENT
Start: 2020-01-12 | End: 2020-01-15 | Stop reason: HOSPADM

## 2020-01-12 RX ORDER — HYDRALAZINE HYDROCHLORIDE 20 MG/ML
10 INJECTION INTRAMUSCULAR; INTRAVENOUS EVERY 4 HOURS PRN
Status: DISCONTINUED | OUTPATIENT
Start: 2020-01-12 | End: 2020-01-15 | Stop reason: HOSPADM

## 2020-01-12 RX ORDER — POTASSIUM CHLORIDE 7.45 MG/ML
10 INJECTION INTRAVENOUS
Status: DISCONTINUED | OUTPATIENT
Start: 2020-01-12 | End: 2020-01-15 | Stop reason: HOSPADM

## 2020-01-12 RX ORDER — POTASSIUM CHLORIDE 29.8 MG/ML
20 INJECTION INTRAVENOUS
Status: DISCONTINUED | OUTPATIENT
Start: 2020-01-12 | End: 2020-01-15 | Stop reason: HOSPADM

## 2020-01-12 RX ORDER — SIMVASTATIN 20 MG
20 TABLET ORAL EVERY EVENING
Status: DISCONTINUED | OUTPATIENT
Start: 2020-01-12 | End: 2020-01-15 | Stop reason: HOSPADM

## 2020-01-12 RX ORDER — POTASSIUM CHLORIDE 1.5 G/1.58G
20-40 POWDER, FOR SOLUTION ORAL
Status: DISCONTINUED | OUTPATIENT
Start: 2020-01-12 | End: 2020-01-15 | Stop reason: HOSPADM

## 2020-01-12 RX ADMIN — LATANOPROST 1 DROP: 50 SOLUTION/ DROPS OPHTHALMIC at 23:52

## 2020-01-12 RX ADMIN — SIMVASTATIN 20 MG: 20 TABLET, FILM COATED ORAL at 20:04

## 2020-01-12 RX ADMIN — HYDRALAZINE HYDROCHLORIDE 10 MG: 20 INJECTION INTRAMUSCULAR; INTRAVENOUS at 15:10

## 2020-01-12 ASSESSMENT — MIFFLIN-ST. JEOR: SCORE: 1680.13

## 2020-01-12 ASSESSMENT — ACTIVITIES OF DAILY LIVING (ADL)
ADLS_ACUITY_SCORE: 11
ADLS_ACUITY_SCORE: 13
ADLS_ACUITY_SCORE: 11
ADLS_ACUITY_SCORE: 13

## 2020-01-12 NOTE — PLAN OF CARE
PT/OT: Per discussion with RN, pt is not appropriate for therapy evaluations this date. Will reschedule.

## 2020-01-12 NOTE — PROGRESS NOTES
"Stroke Progress Note  01/12/2020    ON events: no acute events overnight.     Problem List:  Patient Active Problem List   Diagnosis     ICH (intracerebral hemorrhage) (H)       Current Medications:    latanoprost  1 drop Both Eyes Daily       PRN Medications:  bisacodyl, magnesium hydroxide, magnesium sulfate, metoclopramide **OR** metoclopramide, naloxone, ondansetron **OR** ondansetron, potassium chloride, potassium chloride with lidocaine, potassium chloride, potassium chloride, potassium chloride, prochlorperazine **OR** prochlorperazine **OR** prochlorperazine, senna-docusate **OR** senna-docusate    Infusions:    niCARdipine 40 mg in 200 mL 0.9% NaCl Stopped (01/11/20 1901)       Objective findings:  /80   Pulse 64   Temp 97  F (36.1  C) (Oral)   Resp (!) 6   Ht 1.803 m (5' 11\")   Wt 82.3 kg (181 lb 7 oz)   SpO2 99%   BMI 25.31 kg/m      Intake/Output:    Intake/Output Summary (Last 24 hours) at 1/12/2020 0934  Last data filed at 1/12/2020 0900  Gross per 24 hour   Intake 374.24 ml   Output 1175 ml   Net -800.76 ml       Physical Examination:   Vitals:  B/P: 133/80, T: 97, P: 64, R: 6  General:  Laying in bed  HEENT:  NC/AT, no icterus, op pink and moist, no ear or nose drainage.   Cardiac:  RRR, no m/r/g  Chest:  CTAB  Abdomen:  S/NT/ND  Extremities:  No LE swelling.    Skin:  No rash or lesion.      Neuro Exam:  Mental status: AOx4, speech fluent  Cranial nerves: EOMI, VFF, face symmetric, equal to LT  Motor: 5-/5 R hip flexor, o/w 5/5 diffusely  Sensory: absent on R leg.   Reflexes: 2+ with adductor spread BL LE. No clonus. Toes mute.   Coordination: FTN intact. HTS impaired in RLE  Gait: defer    Labs/Studies:  All pertinent labs and studies reviewed.    ==========================================================    ASSESSMENT/PLAN: 54M hx of pre-DM and HLD who p/w RLE weakness/numbness.      ## L frontal lobe ICH: ICH score of 0.  Unclear etiology at this time. Causes include: HTN (less " "likely), cavernoma, stroke with hemorrhagic tumor, mets, endocarditis, others. He has no significant HTN history. CTA showed no underlying vascular malformation.  --can transfer to floor today  --SBP <140. Not needing any antihypertensives overnight.   --repeat CT head this AM. This was not done yesterday as recommended.   --video EEG given transient neurologic event  --UDS  --f/u TTE  --no antiepileptics indicated at this time.   --will need cerebral angiogram, probably Monday  --if no etiology of bleed found will need repeat MRI scan outpatient after blood has resolved.   --neuro ICU to continue follow    =========================================================    This patient was discussed with my attending, Dr. Ceuvas, who agrees with my assessment and plan.     Isaias Orr  Vascular Neurology Fellow    01/12/2020 9:34 AM  Text Page (8am-7pm)  To page stroke neurology after hours or on a subsequent day, click here: [facility]COM  Choose \"On Call\" tab at top, then search dropdown box for \"Neurology Adult\" & press Enter, look for Neuro ICU/Stroke            "

## 2020-01-12 NOTE — PLAN OF CARE
Pt here with left frontal ICH. A&O x4. Neuros include RLE numbness and weakness. VSS. Tele SR. Regular diet, thin liquids. Takes pills whole. Up with SBA. Denies pain. Pt scoring green on the Aggression Stop Light Tool. Continue to monitor and follow POC.

## 2020-01-12 NOTE — PLAN OF CARE
Report called to 73,  neuros better, no numbness in the RUE,  RLE no changes with weakness, numbness and tingling.  Slight head ache very low grade not wanting any pain meds for it.  Apresoline x1 for sbp over 140.  Repeat CT stable.  Afebrile.

## 2020-01-12 NOTE — PLAN OF CARE
Pt neuro intact expect for numbness & tingling in R leg. Pt WALKER equally and able to bare weight. SR to SB. Bp remains < 140. Nicardapine remains off. Clear LS, voiding per urinal. Wife updated at the bedside. Will continue to monitor.

## 2020-01-12 NOTE — PROGRESS NOTES
EEG CLINICAL NEUROPHYSIOLOGY PRELIMINARY REPORT    First hour of video-EEG reviewed. 10 Hz posterior dominant rhythm. No clear focal slowing. No epileptiform discharges or seizures. Normal study thus far. Will continue video-EEG monitoring. Full report to follow.    Marcos Zee MD  Pager [phone]

## 2020-01-12 NOTE — PROGRESS NOTES
[facility]  LTV/EEG  Day 1 started at 12:10.   Ordered by Tyron Orr MD  Video Observation initiated, patient informed.     Kathryn Kelley  Pt gave permission for video EEG

## 2020-01-12 NOTE — PLAN OF CARE
Pt received to ICU by cart.  VSS, goal for SBP <140 met with use of nicardipine gtt titration.  Pt neuro intact except for report of numbness tingling of R hand and weakness, numbness and tingling of R leg.   Pt denies pain.

## 2020-01-13 ENCOUNTER — APPOINTMENT (OUTPATIENT)
Dept: CT IMAGING | Facility: CLINIC | Age: 55
DRG: 064 | End: 2020-01-13
Payer: COMMERCIAL

## 2020-01-13 ENCOUNTER — APPOINTMENT (OUTPATIENT)
Dept: PHYSICAL THERAPY | Facility: CLINIC | Age: 55
DRG: 064 | End: 2020-01-13
Attending: INTERNAL MEDICINE
Payer: COMMERCIAL

## 2020-01-13 ENCOUNTER — APPOINTMENT (OUTPATIENT)
Dept: OCCUPATIONAL THERAPY | Facility: CLINIC | Age: 55
DRG: 064 | End: 2020-01-13
Attending: INTERNAL MEDICINE
Payer: COMMERCIAL

## 2020-01-13 LAB
ANION GAP SERPL CALCULATED.3IONS-SCNC: 2 MMOL/L (ref 3–14)
BUN SERPL-MCNC: 18 MG/DL (ref 7–30)
CALCIUM SERPL-MCNC: 8.3 MG/DL (ref 8.5–10.1)
CHLORIDE SERPL-SCNC: 110 MMOL/L (ref 94–109)
CO2 SERPL-SCNC: 30 MMOL/L (ref 20–32)
CREAT SERPL-MCNC: 1.01 MG/DL (ref 0.66–1.25)
ERYTHROCYTE [DISTWIDTH] IN BLOOD BY AUTOMATED COUNT: 14.9 % (ref 10–15)
GFR SERPL CREATININE-BSD FRML MDRD: 83 ML/MIN/{1.73_M2}
GLUCOSE SERPL-MCNC: 114 MG/DL (ref 70–99)
HCT VFR BLD AUTO: 45.6 % (ref 40–53)
HGB BLD-MCNC: 14.2 G/DL (ref 13.3–17.7)
MCH RBC QN AUTO: 25.3 PG (ref 26.5–33)
MCHC RBC AUTO-ENTMCNC: 31.1 G/DL (ref 31.5–36.5)
MCV RBC AUTO: 81 FL (ref 78–100)
PLATELET # BLD AUTO: 202 10E9/L (ref 150–450)
POTASSIUM SERPL-SCNC: 3.8 MMOL/L (ref 3.4–5.3)
RBC # BLD AUTO: 5.61 10E12/L (ref 4.4–5.9)
SODIUM SERPL-SCNC: 142 MMOL/L (ref 133–144)
WBC # BLD AUTO: 8.4 10E9/L (ref 4–11)

## 2020-01-13 PROCEDURE — 97165 OT EVAL LOW COMPLEX 30 MIN: CPT | Mod: GO

## 2020-01-13 PROCEDURE — 36415 COLL VENOUS BLD VENIPUNCTURE: CPT | Performed by: INTERNAL MEDICINE

## 2020-01-13 PROCEDURE — 12000000 ZZH R&B MED SURG/OB

## 2020-01-13 PROCEDURE — 97530 THERAPEUTIC ACTIVITIES: CPT | Mod: GO

## 2020-01-13 PROCEDURE — 74177 CT ABD & PELVIS W/CONTRAST: CPT

## 2020-01-13 PROCEDURE — 80048 BASIC METABOLIC PNL TOTAL CA: CPT | Performed by: INTERNAL MEDICINE

## 2020-01-13 PROCEDURE — 25000132 ZZH RX MED GY IP 250 OP 250 PS 637: Performed by: HOSPITALIST

## 2020-01-13 PROCEDURE — 97112 NEUROMUSCULAR REEDUCATION: CPT | Mod: GP

## 2020-01-13 PROCEDURE — 25000132 ZZH RX MED GY IP 250 OP 250 PS 637: Performed by: INTERNAL MEDICINE

## 2020-01-13 PROCEDURE — 97162 PT EVAL MOD COMPLEX 30 MIN: CPT | Mod: GP

## 2020-01-13 PROCEDURE — 80307 DRUG TEST PRSMV CHEM ANLYZR: CPT | Performed by: STUDENT IN AN ORGANIZED HEALTH CARE EDUCATION/TRAINING PROGRAM

## 2020-01-13 PROCEDURE — 97116 GAIT TRAINING THERAPY: CPT | Mod: GP

## 2020-01-13 PROCEDURE — 40000358 ZZHCL STATISTIC DRUG SCREEN MULTIPLE (METRO): Performed by: STUDENT IN AN ORGANIZED HEALTH CARE EDUCATION/TRAINING PROGRAM

## 2020-01-13 PROCEDURE — 25000128 H RX IP 250 OP 636: Performed by: INTERNAL MEDICINE

## 2020-01-13 PROCEDURE — 25000125 ZZHC RX 250: Performed by: INTERNAL MEDICINE

## 2020-01-13 PROCEDURE — 85027 COMPLETE CBC AUTOMATED: CPT | Performed by: INTERNAL MEDICINE

## 2020-01-13 PROCEDURE — 99232 SBSQ HOSP IP/OBS MODERATE 35: CPT | Performed by: HOSPITALIST

## 2020-01-13 PROCEDURE — 99232 SBSQ HOSP IP/OBS MODERATE 35: CPT | Mod: GC | Performed by: PSYCHIATRY & NEUROLOGY

## 2020-01-13 RX ORDER — IOPAMIDOL 755 MG/ML
90 INJECTION, SOLUTION INTRAVASCULAR ONCE
Status: COMPLETED | OUTPATIENT
Start: 2020-01-13 | End: 2020-01-13

## 2020-01-13 RX ORDER — AMLODIPINE BESYLATE 2.5 MG/1
2.5 TABLET ORAL DAILY
Status: DISCONTINUED | OUTPATIENT
Start: 2020-01-13 | End: 2020-01-15 | Stop reason: HOSPADM

## 2020-01-13 RX ADMIN — AMLODIPINE BESYLATE 2.5 MG: 2.5 TABLET ORAL at 16:48

## 2020-01-13 RX ADMIN — IOPAMIDOL 90 ML: 755 INJECTION, SOLUTION INTRAVENOUS at 13:25

## 2020-01-13 RX ADMIN — SIMVASTATIN 20 MG: 20 TABLET, FILM COATED ORAL at 22:25

## 2020-01-13 RX ADMIN — LATANOPROST 1 DROP: 50 SOLUTION/ DROPS OPHTHALMIC at 22:25

## 2020-01-13 RX ADMIN — SODIUM CHLORIDE 67 ML: 9 INJECTION, SOLUTION INTRAVENOUS at 13:25

## 2020-01-13 ASSESSMENT — ACTIVITIES OF DAILY LIVING (ADL)
ADLS_ACUITY_SCORE: 11

## 2020-01-13 ASSESSMENT — MIFFLIN-ST. JEOR: SCORE: 1669.98

## 2020-01-13 NOTE — PLAN OF CARE
Discharge Planner PT   Patient plan for discharge: rehab  Current status:     Eval complete, treatment indicated. Edu PT role, POC. Focus on improving IND with STS transfers with improved RLE WB x 10 reps, good improvement noted. Pt most limited by impaired sensation and coordination RLE, minor weakness noted.     Pt ambulated 180 + 80' with FWW and CGA, cues fo ripmroved gait mechanics as pt demonstrates impairments on RLE with gait, no LOB     Engaged in neuro re ed tasks including toe taps, pre gait exercises, repeated STS     Barriers to return to prior living situation: level of assist, stairs, fall risk   Recommendations for discharge: aru  Rationale for recommendations: Pt will benefit from intense therapy at ARU to optimize IND with functional mobility as pt below baseline. Predict pt can/will be able to tolerate 3 hrs of therapy/day          Entered by: Freda Milligan 01/13/2020 4:22 PM

## 2020-01-13 NOTE — PLAN OF CARE
Pt is a 54 yr old male admitted for  intracranial hemorrhage in the left paracentral lobule w/past medical hx of prediabetes and HL. Pt lives w/spouse and dependent children in a 2 level town home. Pt was previously IND w/I/ADL's. Pt CGA to don new gown.     Discharge Planner OT   Patient plan for discharge: Open to rehab  Current status: Pt sup<>sit SBA w/head of bed elevated. Sit<>stand EOB x3 trails w/FWW CGA w/min LOB. Pt ambulated in room and hallway ~100 ft w/FWW CGA. Pt w/noted R foot drag w/mobility. Toilet transfers CGA w/FWW and grab bars. Pt /63 w/activity.  Barriers to return to prior living situation: Decreased activity tolerance, weakness in RLE, balance, falls risk, stairs  Recommendations for discharge: ARU  Rationale for recommendations: Pt is below baseline for functional transfers and I/ADL's. Pt will benefit from intense therapy in the IP setting. Pt is motivated and will be able tolerate 3+ hours therapy daily.        Entered by: Batsheva Rausch 01/13/2020 12:03 PM

## 2020-01-13 NOTE — PLAN OF CARE
Pt here with ICH. A&Ox4. Neuros intact, ex RLE weakness 3/5 and numbness. VSS on RA. Tele NSR . NPO since 0400. Takes pills whole. Up with A1, gait belt, walker. Denies pain. Pt scoring green on the Aggression Stop Light Tool. Plan for possible angiogram today. Discharge pending.

## 2020-01-13 NOTE — PROGRESS NOTES
01/13/20 1600   Quick Adds   Type of Visit Initial PT Evaluation   Living Environment   Lives With spouse;child(zeenat), dependent   Living Arrangements house   Home Accessibility stairs within home   Number of Stairs, Within Home, Primary other (see comments)  (16)   Stair Railings, Within Home, Primary railing on right side (ascending)   Transportation Anticipated family or friend will provide   Living Environment Comment pt lives with wife and adult children in a 2 story [place]   Self-Care   Usual Activity Tolerance excellent   Current Activity Tolerance moderate   Regular Exercise Yes   Activity/Exercise Type biking   Exercise Amount/Frequency daily   Activity/Exercise/Self-Care Comment Pt IND at basline    Functional Level Prior   Ambulation 0-->independent   Transferring 0-->independent   Toileting 0-->independent   Bathing 0-->independent   Communication 0-->understands/communicates without difficulty   Cognition 0 - no cognition issues reported   Prior Functional Level Comment Pt IND with mobility prior to admit    General Information   Onset of Illness/Injury or Date of Surgery - Date 01/11/20   Referring Physician Amina Mchugh MD   Pertinent History of Current Problem (include personal factors and/or comorbidities that impact the POC) Good Petty is a 54 year old with hx of prediabetes and HL who was admitted on 1/11/2020 for spontaneous ICH   Precautions/Limitations fall precautions   General Observations BP goal < 140 SBP    Cognitive Status Examination   Orientation orientation to person, place and time   Pain Assessment   Patient Currently in Pain No   Posture    Posture Forward head position   Range of Motion (ROM)   ROM Comment BLE WFL    Strength   Strength Comments RLE grossly 4/5 strength. LLE WFL    Bed Mobility   Bed Mobility Comments SBA   Transfer Skills   Transfer Comments STS with FWW, dec RLE WB, use of BUE push off    Gait   Gait Comments Pt ambulates with FWW and CGA,  "demonstrates absent RLE heel strike, RLE circumduction, RLE inversion with ft flat phase of gait, dec stance time   Balance   Balance Comments good dynamic balance with use of FWW    Sensory Examination   Sensory Perception Comments impaired, diminished on RLE. Improving since admit, numbness below knee   Coordination   Coordination Comments impaired RLE coordination    General Therapy Interventions   Planned Therapy Interventions balance training;bed mobility training;gait training;neuromuscular re-education;strengthening;transfer training   Clinical Impression   Criteria for Skilled Therapeutic Intervention yes, treatment indicated   PT Diagnosis impaired IND with transfers, gait, impaired activity tolerance   Influenced by the following impairments weakness, coordination, balance   Functional limitations due to impairments see above   Clinical Presentation Evolving/Changing   Clinical Presentation Rationale clinical judgement, co morbidities    Clinical Decision Making (Complexity) Moderate complexity   Therapy Frequency 2x/day   Predicted Duration of Therapy Intervention (days/wks) 5 days   Anticipated Discharge Disposition Acute Rehabilitation Facility   Risk & Benefits of therapy have been explained Yes   Patient, Family & other staff in agreement with plan of care Yes   [facility] AM-PAC  \"6 Clicks\" V.2 Basic Mobility Inpatient Short Form   1. Turning from your back to your side while in a flat bed without using bedrails? 4 - None   2. Moving from lying on your back to sitting on the side of a flat bed without using bedrails? 4 - None   3. Moving to and from a bed to a chair (including a wheelchair)? 3 - A Little   4. Standing up from a chair using your arms (e.g., wheelchair, or bedside chair)? 3 - A Little   5. To walk in hospital room? 3 - A Little   6. Climbing 3-5 steps with a railing? 2 - A Lot   Basic Mobility Raw Score (Score out of 24.Lower scores equate to lower levels of function) 19   Total " Evaluation Time   Total Evaluation Time (Minutes) 15

## 2020-01-13 NOTE — PROGRESS NOTES
01/13/20 1100   Quick Adds   Type of Visit Initial Occupational Therapy Evaluation   Living Environment   Lives With spouse;child(zeenat), dependent   Living Arrangements house  (town home)   Home Accessibility stairs within home   Number of Stairs, Within Home, Primary other (see comments)  (16)   Stair Railings, Within Home, Primary railing on right side (ascending)   Transportation Anticipated family or friend will provide   Living Environment Comment Pt lives w/wife and adult children in a 2 story town home    Self-Care   Usual Activity Tolerance excellent   Current Activity Tolerance moderate   Regular Exercise Yes   Activity/Exercise Type biking   Exercise Amount/Frequency daily   Functional Level   Ambulation 0-->independent   Transferring 0-->independent   Toileting 0-->independent   Bathing 0-->independent  (bathtub )   Dressing 0-->independent   Eating 0-->independent   Communication 0-->understands/communicates without difficulty   Cognition 0 - no cognition issues reported   Fall history within last six months no   Which of the above functional risks had a recent onset or change? ambulation;transferring   Prior Functional Level Comment Pt was IND priot to admit w/all I/ADL   General Information   Onset of Illness/Injury or Date of Surgery - Date 01/11/20   Referring Physician Willem Mclain MD   Patient/Family Goals Statement Open to rehab   Additional Occupational Profile Info/Pertinent History of Current Problem  intracranial hemorrhage in the left paracentral lobule   Precautions/Limitations fall precautions   Weight-Bearing Status - LLE full weight-bearing   Weight-Bearing Status - RLE full weight-bearing   General Info Comments Pt experiencing numbness and weakness in RLE.    Cognitive Status Examination   Orientation orientation to person, place and time   Level of Consciousness alert   Visual Perception   Visual Perception Comments reading glasses   Sensory Examination   Sensory Comments  Tingling/numbness in RLE, pt notes improvements from yesterday   Pain Assessment   Patient Currently in Pain Yes, see Vital Sign flowsheet   Posture   Posture not impaired   Range of Motion (ROM)   ROM Quick Adds No deficits were identified   Strength   Manual Muscle Testing Quick Adds No deficits were identified   Strength Comments 5/5 LUE, 4+/5 RUE   Hand Strength   Hand Strength Comments slightly weaker in RUE   Coordination   Upper Extremity Coordination No deficits were identified   Transfer Skill: Sit to Stand   Level of [place]: Sit/Stand contact guard   Physical Assist/Nonphysical Assist: Sit/Stand supervision;verbal cues   Transfer Skill: Sit to Stand full weight-bearing   Assistive Device for Transfer: Sit/Stand rolling walker   Transfer Skill: Toilet Transfer   Level of [place]: Toilet contact guard   Physical Assist/Nonphysical Assist: Toilet supervision;verbal cues   Weight-Bearing Restrictions: Toilet full weight-bearing   Assistive Device rolling walker;grab bars   Instrumental Activities of Daily Living (IADL)   Previous Responsibilities driving;work;medication management;finances;meal prep;housekeeping;laundry;shopping   Activities of Daily Living Analysis   Impairments Contributing to Impaired Activities of Daily Living balance impaired;sensation decreased;ROM decreased;strength decreased   General Therapy Interventions   Planned Therapy Interventions ADL retraining   Clinical Impression   Criteria for Skilled Therapeutic Interventions Met yes, treatment indicated   OT Diagnosis Impaired safety w/functional transfers, Decreased IND w/I/ADL   Influenced by the following impairments Decreased balance, weakness, decreased activity tolerance   Assessment of Occupational Performance 1-3 Performance Deficits   Identified Performance Deficits functional transfers, I/ADL   Clinical Decision Making (Complexity) Low complexity   Therapy Frequency Daily   Predicted Duration of Therapy Intervention  (days/wks) 7   Anticipated Equipment Needs at Discharge shower chair   Anticipated Discharge Disposition Acute Rehabilitation Facility   Risks and Benefits of Treatment have been explained. Yes   Patient, Family & other staff in agreement with plan of care Yes   Total Evaluation Time   Total Evaluation Time (Minutes) 10

## 2020-01-13 NOTE — PROGRESS NOTES
"Stroke Progress Note  01/13/2020    ON events: transferred to the floor.    Problem List:  Patient Active Problem List   Diagnosis     ICH (intracerebral hemorrhage) (H)       Current Medications:    latanoprost  1 drop Both Eyes Daily     simvastatin  20 mg Oral QPM       PRN Medications:  bisacodyl, hydrALAZINE, magnesium hydroxide, magnesium sulfate, metoclopramide **OR** metoclopramide, naloxone, ondansetron **OR** ondansetron, potassium chloride, potassium chloride with lidocaine, potassium chloride, potassium chloride, potassium chloride, prochlorperazine **OR** prochlorperazine **OR** prochlorperazine, senna-docusate **OR** senna-docusate    Infusions:      Objective findings:  /86 (BP Location: Left arm)   Pulse 85   Temp 98.3  F (36.8  C) (Oral)   Resp 16   Ht 1.803 m (5' 11\")   Wt 81.3 kg (179 lb 3.2 oz)   SpO2 99%   BMI 24.99 kg/m      Intake/Output:    Intake/Output Summary (Last 24 hours) at 1/13/2020 0901  Last data filed at 1/13/2020 0344  Gross per 24 hour   Intake 950 ml   Output --   Net 950 ml       Physical Examination:   Vitals:  B/P: 116/86, T: 98.3, P: 85, R: 16  General:  Laying in bed  HEENT:  NC/AT, no icterus, op pink and moist, no ear or nose drainage.   Cardiac:  RRR, no m/r/g  Chest:  CTAB  Abdomen:  S/NT/ND  Extremities:  No LE swelling.    Skin:  No rash or lesion.      Neuro Exam:  Mental status: AOx4, speech fluent  Cranial nerves: EOMI, VFF, face symmetric, equal to LT  Motor: 5-/5 R hip flexor, o/w 5/5 diffusely  Sensory: absent on R leg.   Reflexes: 2+ with adductor spread BL LE. No clonus. Toes mute.   Coordination: FTN intact. HTS impaired in RLE  Gait: defer    Labs/Studies:  All pertinent labs and studies reviewed.    ==========================================================    ASSESSMENT/PLAN: 54M hx of pre-DM and HLD who p/w RLE weakness/numbness.      ## L frontal lobe ICH: ICH score of 0.  Unclear etiology at this time. Causes include: HTN (less likely), " "cavernoma, stroke with hemorrhagic tumor, mets, endocarditis, others. He has no significant HTN history. CTA showed no underlying vascular malformation. TTE unremarkable.   --SBP <140.   --discontinue video EEG  --CT chest abdomen pelvis with contrast.   --f/u vEEG read  --UDS  --PT/OT  --will need cerebral angiogram outpatient.   --if no etiology of bleed found will need repeat MRI scan outpatient after blood has resolved in 6-8 weeks  --neuro ICU to continue follow    =========================================================    This patient was discussed with my attending, Dr. Cuevas, who agrees with my assessment and plan.     Isaias Orr  Vascular Neurology Fellow    01/13/2020 9:01 AM  Text Page (8am-7pm)  To page stroke neurology after hours or on a subsequent day, click here: AMCOM  Choose \"On Call\" tab at top, then search dropdown box for \"Neurology Adult\" & press Enter, look for Neuro ICU/Stroke            "

## 2020-01-13 NOTE — PLAN OF CARE
Pt here with ICH. A&Ox4. Neuros intact ex RLE weakness 3/5 and numbness. VSS on RA. Tele NSR. Regular diet,  NPO overnight for possible angio tomorrow. Takes pills whole. Up with 1 GB and walker. Denies pain. Pt scoring Green on the Aggression Stop Light Tool. Plan for possible angio 1/13. Discharge pending.

## 2020-01-13 NOTE — PROGRESS NOTES
[facility]  Hospitalist Progress Note    Date of Service (when I saw the patient): 01/13/2020    Assessment & Plan   Good Petty is a 54 year old with hx of prediabetes and HL who was admitted on 1/11/2020 for spontaneous ICH    Acute to subacute left paracentral intraparenchymal hematoma with vasogenic edema  Presented with RLE numbness, tingling, and weakness. Head CT and brain MRI on arrival showed acute to subacute 3.2 cm intraparenchymal hematoma with the left paracentral lobule and a possible cavernoma within the lateral left postcentral gyrus. CTA head/neck showed non-specific mild narrowing of the Rt ICA. The patient denies history of trauma/falls. He reports a history of prediabetes and hyperlipidemia, but has no known history of hypertension. Neurocritical care was notified in the ED and medical management with strict blood pressure control was recommended. On admission, he was initiated on a nicardipine gtt.  - Repeat head CT today shows decrease in size of hemorrhage  - EEG completed this am, report pending. Urine drug screen ordered by Neuro.  - Neurology recommending cerebral angiogram as outpatient to evaluate for the cavernoma, and CT chest abd and pelvis to screen for any mass/malignancy.   - IV hydralazine PRN for goal SBP<140. Has received IV hydralazine in last 24 hours, his BP has been good today. Will start him on amlodipine 2.5 mg daily.  - Neuro checks q4h    Sinus bradycardia  HR 50s-60s. Asymptomatic    Possible hypertension  TTE (1/11) showed mild to moderate LVH which is suggestive of uncontrolled hypertension, but patient's blood pressures have been acceptable thus far  - Monitor blood pressure for now  - IV hydralazine PRN for goal SBP<140  -starting amlodipine to avoid need of IV medicine now anticipating discharge in 1-2 days.     Hyperlipidemia  Lipid panel on admission showed Tchol 203, , HDL 63. ASCVD risk 7.1%  - Started simvastatin 20 mg  qhs    Prediabetes  A1c 6.0  - Blood sugars have been acceptable during this hospitalization  - Follow up with PCP    Glaucoma  - Continues on PTA eye drops    FEN: Regular diet  DVT Prophylaxis: Pneumatic Compression Devices  Code Status: Full Code    Disposition: Expected discharge: Therapy eval noted, recommending ARU, discussed with care coordinator.   Discussed with patient and his wife.     Annette Driver MD  Hospitalist    Interval History   No events overnight. Denies headache. Reports numbness/tingling in RLE is now in much less area distally .    -Data reviewed today: I reviewed all new labs and imaging results over the last 24 hours. I personally reviewed the head CT which shows decrease in size of ICH    Physical Exam   Temp: 98.2  F (36.8  C) Temp src: Oral BP: 113/79 Pulse: 85 Heart Rate: 56 Resp: 16 SpO2: 97 % O2 Device: None (Room air)    Vitals:    01/11/20 1610 01/12/20 0500 01/13/20 0632   Weight: 79.9 kg (176 lb 2.4 oz) 82.3 kg (181 lb 7 oz) 81.3 kg (179 lb 3.2 oz)     Vital Signs with Ranges  Temp:  [98.1  F (36.7  C)-98.7  F (37.1  C)] 98.2  F (36.8  C)  Pulse:  [64-85] 85  Heart Rate:  [] 56  Resp:  [11-23] 16  BP: (109-144)/(63-97) 113/79  SpO2:  [84 %-100 %] 97 %  I/O last 3 completed shifts:  In: 950 [P.O.:950]  Out: 175 [Urine:175]    Constitutional: Resting comfortably, NAD  Respiratory: Breathing non-labored. Lungs CTAB - no wheezes, crackles, or rhonchi  Cardiovascular: s1s2 regular.   GI: +BS, abd soft/NT.  Skin/Integument: No rash  Musculoskeletal: Normal muscle bulk and tone  Neuro: Alert and appropriate, WALKER. 5/5 strength throughout  Psych: Calm and cooperative    Medications       latanoprost  1 drop Both Eyes Daily     simvastatin  20 mg Oral QPM     Data   Recent Labs   Lab 01/13/20  0830 01/12/20  0425 01/11/20  1057   WBC 8.4  --  4.4   HGB 14.2  --  15.2   MCV 81  --  81     --  218   INR  --  1.05 0.98     --  139   POTASSIUM 3.8  --  4.2   CHLORIDE 110*   --  104   CO2 30  --  33*   BUN 18  --  14   CR 1.01  --  1.02   ANIONGAP 2*  --  2*   JULIA 8.3*  --  9.2   *  --  107*   ALBUMIN  --   --  4.4   PROTTOTAL  --   --  7.3   BILITOTAL  --   --  0.5   ALKPHOS  --   --  67   ALT  --   --  41   AST  --   --  27       No results found for this or any previous visit (from the past 24 hour(s)).

## 2020-01-13 NOTE — PLAN OF CARE
Pt here with spontaneous ICH. A&Ox4. Neuros RLE weakness, 4/5 and  sensation. VSS, SBP<140, started on oral Norvasc today. Tele SB. Regular diet, thin liquids. Takes pills whole. Up with assist x1, GB and walker. Denies pain. EEG completed today.  Pt scoring green on the Aggression Stop Light Tool. Plan continue to mobilize. Discharge therapy recommending ARU.

## 2020-01-14 ENCOUNTER — APPOINTMENT (OUTPATIENT)
Dept: OCCUPATIONAL THERAPY | Facility: CLINIC | Age: 55
DRG: 064 | End: 2020-01-14
Payer: COMMERCIAL

## 2020-01-14 ENCOUNTER — APPOINTMENT (OUTPATIENT)
Dept: PHYSICAL THERAPY | Facility: CLINIC | Age: 55
DRG: 064 | End: 2020-01-14
Payer: COMMERCIAL

## 2020-01-14 LAB
ACETAMINOPHEN QUAL: NEGATIVE
AMANTADINE: NEGATIVE
AMITRIPTYLINE QUAL: NEGATIVE
AMOXAPINE: NEGATIVE
AMPHETAMINES QUAL: NEGATIVE
ATROPINE: NEGATIVE
BENZODIAZ UR QL: NEGATIVE
BUPROPION QUAL: NEGATIVE
CAFFEINE QUAL: NEGATIVE
CANNABINOIDS UR QL SCN: NEGATIVE
CARBAMAZEPINE QUAL: NEGATIVE
CHLORPHENIRAMINE: NEGATIVE
CHLORPROMAZINE: NEGATIVE
CITALOPRAM QUAL: NEGATIVE
CLOMIPRAMINE QUAL: NEGATIVE
COCAINE QUAL: NEGATIVE
COCAINE UR QL: NEGATIVE
CODEINE QUAL: NEGATIVE
DESIPRAMINE QUAL: NEGATIVE
DEXTROMETHORPHAN: NEGATIVE
DIPHENHYDRAMINE: NEGATIVE
DOXEPIN/METABOLITE: NEGATIVE
DOXYLAMINE: NEGATIVE
EPHEDRINE OR PSEUDO: NEGATIVE
FENTANYL QUAL: NEGATIVE
FLUOXETINE AND METAB: NEGATIVE
GLUCOSE BLDC GLUCOMTR-MCNC: 139 MG/DL (ref 70–99)
HYDROCODONE QUAL: NEGATIVE
HYDROMORPHONE QUAL: NEGATIVE
IBUPROFEN QUAL: NEGATIVE
IMIPRAMINE QUAL: NEGATIVE
INTERPRETATION ECG - MUSE: NORMAL
KETAMINE QUAL: NEGATIVE
LAMOTRIGINE QUAL: NEGATIVE
LIDOCAIN SPEC QL: NEGATIVE
LOXAPINE: NEGATIVE
MAPROTYLINE: NEGATIVE
MDMA QUAL: NEGATIVE
MEPERIDINE QUAL: NEGATIVE
METHAMPHETAMINE: NEGATIVE
METHODONE QUAL: NEGATIVE
MIRTAZAPINE QUAL: NEGATIVE
MORPHINE QUAL: NEGATIVE
NICOTINE: NEGATIVE
NORTRIPTYLINE QUAL: NEGATIVE
OLANZAPINE QUAL: NEGATIVE
OPIATES UR QL SCN: NEGATIVE
OXYCODONE QUAL: NEGATIVE
PENTAZOCINE: NEGATIVE
PHENCYCLIDINE QUAL: NEGATIVE
PHENTERMINE: NEGATIVE
PROPOFOL QUAL: NEGATIVE
PROPOXPHENE QUAL: NEGATIVE
PROPRANOLOL QUAL: NEGATIVE
PYRILAMINE: NEGATIVE
QUETIAPINE METAB QUAL: NEGATIVE
SALICYLATE QUAL: NEGATIVE
SERTRALINE QUAL: NEGATIVE
THEOBROMINE: POSITIVE
TOPIRAMATE QUAL: NEGATIVE
TRAMADOL QUAL: NEGATIVE
TRIMIPRAMINE QUAL: NEGATIVE
VENLAFAXINE QUAL: NEGATIVE

## 2020-01-14 PROCEDURE — 25000132 ZZH RX MED GY IP 250 OP 250 PS 637: Performed by: HOSPITALIST

## 2020-01-14 PROCEDURE — 99232 SBSQ HOSP IP/OBS MODERATE 35: CPT | Mod: GC | Performed by: PSYCHIATRY & NEUROLOGY

## 2020-01-14 PROCEDURE — 00000146 ZZHCL STATISTIC GLUCOSE BY METER IP

## 2020-01-14 PROCEDURE — 25000132 ZZH RX MED GY IP 250 OP 250 PS 637: Performed by: INTERNAL MEDICINE

## 2020-01-14 PROCEDURE — 97116 GAIT TRAINING THERAPY: CPT | Mod: GP | Performed by: PHYSICAL THERAPIST

## 2020-01-14 PROCEDURE — 12000000 ZZH R&B MED SURG/OB

## 2020-01-14 PROCEDURE — 99232 SBSQ HOSP IP/OBS MODERATE 35: CPT | Performed by: INTERNAL MEDICINE

## 2020-01-14 PROCEDURE — 97535 SELF CARE MNGMENT TRAINING: CPT | Mod: GO

## 2020-01-14 PROCEDURE — 97530 THERAPEUTIC ACTIVITIES: CPT | Mod: GO

## 2020-01-14 RX ADMIN — SIMVASTATIN 20 MG: 20 TABLET, FILM COATED ORAL at 21:17

## 2020-01-14 RX ADMIN — AMLODIPINE BESYLATE 2.5 MG: 2.5 TABLET ORAL at 08:35

## 2020-01-14 RX ADMIN — LATANOPROST 1 DROP: 50 SOLUTION/ DROPS OPHTHALMIC at 21:18

## 2020-01-14 ASSESSMENT — MIFFLIN-ST. JEOR: SCORE: 1683.58

## 2020-01-14 ASSESSMENT — ACTIVITIES OF DAILY LIVING (ADL)
ADLS_ACUITY_SCORE: 11

## 2020-01-14 NOTE — PLAN OF CARE
Pt here with spontaneous ICH. A&Ox4. Neuros intact ex RLE weakness (4/5) and numbness. VSS. Tele SB.  overnight. Seizure precautions maintained. Regular diet, thin liquids. Takes pills whole. Up with 1, GB/walker. Denies pain. Pt scoring green on the Aggression Stop Light Tool. Discharge to ARU pending therapy.

## 2020-01-14 NOTE — PROVIDER NOTIFICATION
Dr. Rodgers paged.    FYI: Pt has FMLA paperwork for you to fill out. It is in the front of the chart when you get a chance.

## 2020-01-14 NOTE — PLAN OF CARE
Pt is A&Ox4, VSS on RA, Tele: SD. Neuros intact ex RLE has numbness and weakness. Strength 4/5. SBA with walker/GB. Reg diet. Plan to go to ARU, pending placement.

## 2020-01-14 NOTE — PLAN OF CARE
Discharge Planner OT   Patient plan for discharge: ARU  Current status: Pt spv for transfers w/ 1 VC for safe hand placement. Functional mobility w/in room using 2WW w/ SBA, no LOB. Oral-care at sink w/ SBA. Completed standing activity > 10 min focusing on reaching high/low out of BLOSSOM, tolerated well w/ no overt LOB.  Barriers to return to prior living situation: fall risk, decreased ind w/ ADL/IADL and mobility, decreased higher level balance skills  Recommendations for discharge: ARU  Rationale for recommendations: Pt is below functional baseline w/ ADL's/IADL's and mobility and will  Benefit from intensive therapy at ARU. Pt is very motivated to work w/ therapy and will tolerate 3 hrs of therapy daily       Entered by: Traci Carr 01/14/2020 3:44 PM

## 2020-01-14 NOTE — PLAN OF CARE
Discharge Planner PT   Patient plan for discharge: ARC  Current status: Patient supine when therapist arrived. Able to transfer sup to sit independently. Needed cues for technique with sit to stand. With support of walker, practiced weight shift right and left and then added toe tapping on each side. Excessive weight shift to right initially but once manual cues given at gluts patient able to correct. Gait training with ww. Patient initially taking very controlled slow steps with each foot and keeping head and shoulders very tense. Cues to decrease shoulder, neck tension and to take as normal as possible step with left. Excellent follow through and after that patient able to cue himself. Left up in chair with chair alarm on.   Barriers to return to prior living situation: Current fall risk, decreased independence with transfers and amb.   Recommendations for discharge: ARC  Rationale for recommendations: Pt will benefit from intense therapy at ARU to optimize IND with functional mobility as pt below baseline. Predict pt can/will be able to tolerate 3 hrs of therapy/day        Entered by: Nathaly Mckinnon 01/14/2020 1:27 PM

## 2020-01-14 NOTE — PROGRESS NOTES
"Stroke Progress Note  01/14/2020    ON events: no acute events overnight. CT CAP negative for malignancy.     Problem List:  Patient Active Problem List   Diagnosis     ICH (intracerebral hemorrhage) (H)       Current Medications:    amLODIPine  2.5 mg Oral Daily     latanoprost  1 drop Both Eyes Daily     simvastatin  20 mg Oral QPM       PRN Medications:  bisacodyl, hydrALAZINE, magnesium hydroxide, magnesium sulfate, metoclopramide **OR** metoclopramide, naloxone, ondansetron **OR** ondansetron, potassium chloride, potassium chloride with lidocaine, potassium chloride, potassium chloride, potassium chloride, prochlorperazine **OR** prochlorperazine **OR** prochlorperazine, senna-docusate **OR** senna-docusate    Infusions:      Objective findings:  /83 (BP Location: Left arm)   Pulse 59   Temp 98.3  F (36.8  C) (Oral)   Resp 16   Ht 1.803 m (5' 11\")   Wt 82.6 kg (182 lb 3.2 oz)   SpO2 99%   BMI 25.41 kg/m      Intake/Output:    Intake/Output Summary (Last 24 hours) at 1/14/2020 0829  Last data filed at 1/13/2020 1856  Gross per 24 hour   Intake 660 ml   Output 100 ml   Net 560 ml       Physical Examination:   Vitals:  B/P: 126/83, T: 98.3, P: 59, R: 16  General:  Laying in bed  HEENT:  NC/AT, no icterus, op pink and moist, no ear or nose drainage.   Cardiac:  RRR, no m/r/g  Chest:  CTAB  Abdomen:  S/NT/ND  Extremities:  No LE swelling.    Skin:  No rash or lesion.      Neuro Exam:  Mental status: AOx4, speech fluent  Cranial nerves: EOMI, VFF, face symmetric, equal to LT  Motor: 5-/5 R hip flexor, o/w 5/5 diffusely  Sensory: absent on R leg.   Reflexes: 2+ with adductor spread BL LE. No clonus. Toes mute.   Coordination: FTN intact. HTS impaired in RLE  Gait: defer    Labs/Studies:  All pertinent labs and studies reviewed.    ==========================================================    ASSESSMENT/PLAN: 54M hx of pre-DM and HLD who p/w RLE weakness/numbness.      ## L frontal lobe ICH: ICH score of " "0.  Unclear etiology at this time. Causes include: HTN (less likely), cavernoma, stroke with hemorrhagic tumor, mets, endocarditis, others. He has no significant HTN history. CTA showed no underlying vascular malformation. TTE unremarkable. CT chest abdomen pelvis with contrast negative for malignancy.  --SBP <140.   --f/u vEEG read - initial negative.   --UDS  --PT/OT  --needs repeat MRI brain with contrast in one month  --f/u stroke clinic in 6 weeks after MRI. If MRI negative for a cause, will need cerebral angiogram to be set up in the future to investigating non-cavernomatous vascular malformation  --awaiting ARU  --stroke team to sign off. Please call if further questions    =========================================================    This patient was discussed with my attending, Dr. Cuevas, who agrees with my assessment and plan.     Isaias Orr  Vascular Neurology Fellow    01/14/2020 8:29 AM  Text Page (8am-7pm)  To page stroke neurology after hours or on a subsequent day, click here: AMCOM  Choose \"On Call\" tab at top, then search dropdown box for \"Neurology Adult\" & press Enter, look for Neuro ICU/Stroke            "

## 2020-01-14 NOTE — PROGRESS NOTES
[facility]  Hospitalist Progress Note    Date of Service (when I saw the patient): 01/14/2020    Assessment & Plan   Good Petty is a 54 year old with hx of prediabetes and HL who was admitted on 1/11/2020 for spontaneous ICH    Acute to subacute left paracentral intraparenchymal hematoma with vasogenic edema  Presented with RLE numbness, tingling, and weakness. Head CT and brain MRI on arrival showed acute to subacute 3.2 cm intraparenchymal hematoma with the left paracentral lobule and a possible cavernoma within the lateral left postcentral gyrus. CTA head/neck showed non-specific mild narrowing of the Rt ICA. The patient denies history of trauma/falls. He reports a history of prediabetes and hyperlipidemia, but has no known history of hypertension. Neurocritical care was notified in the ED and medical management with strict blood pressure control was recommended. On admission, he was initiated on a nicardipine gtt.  - Repeat head CT today shows decrease in size of hemorrhage  - EEG completed this am, report pending. Urine drug screen ordered by Neuro.  - Neurology recommending cerebral angiogram as outpatient to evaluate for the cavernoma  As --needs repeat MRI brain with contrast in one month  --f/u stroke clinic in 6 weeks after MRI. If MRI negative for a cause, will need cerebral angiogram to be set up in the future to investigating non-cavernomatous vascular malformation  --awaiting ARUper Neurology     - IV hydralazine PRN for goal SBP<140. Has received IV hydralazine in last 24 hours, his BP has been good today. Will start him on amlodipine 2.5 mg daily.  - Neuro checks q4h  CT chest abdomen returned negative for malignancy     Sinus bradycardia  HR 50s-60s. Asymptomatic    Possible hypertension  TTE (1/11) showed mild to moderate LVH which is suggestive of uncontrolled hypertension, but patient's blood pressures have been acceptable thus far  - Monitor blood pressure for now  - IV  hydralazine PRN for goal SBP<140  -starting amlodipine to avoid need of IV medicine now anticipating discharge in 1-2 days.  BP better controlled with oral norvasc 2.5mg PO daily now in the 110s to 120s today      Hyperlipidemia  Lipid panel on admission showed Tchol 203, , HDL 63. ASCVD risk 7.1%  - Started simvastatin 20 mg qhs    Prediabetes  A1c 6.0  - Blood sugars have been acceptable during this hospitalization  - Follow up with PCP    Glaucoma  - Continues on PTA eye drops    FEN: Regular diet  DVT Prophylaxis: Pneumatic Compression Devices  Code Status: Full Code    Disposition: Expected discharge: Therapy eval noted, recommending ARU, discussed with care coordinator.   Discussed with patient and bedside RN today     Becka Rodgers MD  Hospitalist    Interval History   No events overnight. Denies headache. Reports numbness/tingling in RLE is now in much less area distally .    -Data reviewed today: I reviewed all new labs and imaging results over the last 24 hours. I personally reviewed the head CT which shows decrease in size of ICH    Physical Exam   Temp: 98.5  F (36.9  C) Temp src: Oral BP: 122/76 Pulse: 74 Heart Rate: 68 Resp: 16 SpO2: 99 % O2 Device: None (Room air)    Vitals:    01/12/20 0500 01/13/20 0632 01/14/20 0619   Weight: 82.3 kg (181 lb 7 oz) 81.3 kg (179 lb 3.2 oz) 82.6 kg (182 lb 3.2 oz)     Vital Signs with Ranges  Temp:  [98  F (36.7  C)-98.5  F (36.9  C)] 98.5  F (36.9  C)  Pulse:  [59-74] 74  Heart Rate:  [63-75] 68  Resp:  [16] 16  BP: (113-127)/(68-83) 122/76  SpO2:  [96 %-99 %] 99 %  I/O last 3 completed shifts:  In: 660 [P.O.:660]  Out: 100 [Urine:100]    Constitutional: Resting comfortably, NAD  Respiratory: Breathing non-labored. Lungs CTAB - no wheezes, crackles, or rhonchi  Cardiovascular: s1s2 regular.   GI: +BS, abd soft/NT.  Skin/Integument: No rash  Musculoskeletal: Normal muscle bulk and tone  Neuro: Alert and appropriate, WALKER. 4/5 strength RLE   Psych: Calm and  cooperative    Medications       amLODIPine  2.5 mg Oral Daily     latanoprost  1 drop Both Eyes Daily     simvastatin  20 mg Oral QPM     Data   Recent Labs   Lab 01/13/20  0830 01/12/20  0425 01/11/20  1057   WBC 8.4  --  4.4   HGB 14.2  --  15.2   MCV 81  --  81     --  218   INR  --  1.05 0.98     --  139   POTASSIUM 3.8  --  4.2   CHLORIDE 110*  --  104   CO2 30  --  33*   BUN 18  --  14   CR 1.01  --  1.02   ANIONGAP 2*  --  2*   JULIA 8.3*  --  9.2   *  --  107*   ALBUMIN  --   --  4.4   PROTTOTAL  --   --  7.3   BILITOTAL  --   --  0.5   ALKPHOS  --   --  67   ALT  --   --  41   AST  --   --  27       No results found for this or any previous visit (from the past 24 hour(s)).

## 2020-01-15 ENCOUNTER — APPOINTMENT (OUTPATIENT)
Dept: PHYSICAL THERAPY | Facility: CLINIC | Age: 55
DRG: 064 | End: 2020-01-15
Payer: COMMERCIAL

## 2020-01-15 ENCOUNTER — HOSPITAL ENCOUNTER (INPATIENT)
Facility: CLINIC | Age: 55
LOS: 6 days | Discharge: HOME | DRG: 057 | End: 2020-01-21
Attending: PHYSICAL MEDICINE & REHABILITATION | Admitting: PHYSICAL MEDICINE & REHABILITATION
Payer: COMMERCIAL

## 2020-01-15 VITALS
HEART RATE: 64 BPM | HEIGHT: 71 IN | WEIGHT: 182.2 LBS | DIASTOLIC BLOOD PRESSURE: 89 MMHG | TEMPERATURE: 97.6 F | RESPIRATION RATE: 16 BRPM | BODY MASS INDEX: 25.51 KG/M2 | SYSTOLIC BLOOD PRESSURE: 133 MMHG | OXYGEN SATURATION: 99 %

## 2020-01-15 DIAGNOSIS — I61.8 OTHER NONTRAUMATIC INTRACEREBRAL HEMORRHAGE, UNSPECIFIED LATERALITY (H): ICD-10-CM

## 2020-01-15 DIAGNOSIS — I61.8 OTHER LEFT-SIDED NONTRAUMATIC INTRACEREBRAL HEMORRHAGE (H): Primary | ICD-10-CM

## 2020-01-15 PROBLEM — I63.9 STROKE (H): Status: ACTIVE | Noted: 2020-01-15

## 2020-01-15 PROCEDURE — 99239 HOSP IP/OBS DSCHRG MGMT >30: CPT | Performed by: INTERNAL MEDICINE

## 2020-01-15 PROCEDURE — 97110 THERAPEUTIC EXERCISES: CPT | Mod: GP

## 2020-01-15 PROCEDURE — 97116 GAIT TRAINING THERAPY: CPT | Mod: GP

## 2020-01-15 PROCEDURE — 25000132 ZZH RX MED GY IP 250 OP 250 PS 637: Performed by: NURSE PRACTITIONER

## 2020-01-15 PROCEDURE — 25000132 ZZH RX MED GY IP 250 OP 250 PS 637: Performed by: HOSPITALIST

## 2020-01-15 PROCEDURE — 12800006 ZZH R&B REHAB

## 2020-01-15 RX ORDER — AMLODIPINE BESYLATE 2.5 MG/1
2.5 TABLET ORAL DAILY
Qty: 30 TABLET | Refills: 0 | Status: ON HOLD | DISCHARGE
Start: 2020-01-16 | End: 2020-01-20

## 2020-01-15 RX ORDER — SIMVASTATIN 20 MG
20 TABLET ORAL EVERY EVENING
Qty: 30 TABLET | Refills: 0 | Status: ON HOLD | DISCHARGE
Start: 2020-01-15 | End: 2020-01-20

## 2020-01-15 RX ORDER — AMLODIPINE BESYLATE 2.5 MG/1
2.5 TABLET ORAL DAILY
Status: DISCONTINUED | OUTPATIENT
Start: 2020-01-16 | End: 2020-01-21 | Stop reason: HOSPADM

## 2020-01-15 RX ORDER — HYDRALAZINE HYDROCHLORIDE 10 MG/1
10 TABLET, FILM COATED ORAL 4 TIMES DAILY PRN
Status: DISCONTINUED | OUTPATIENT
Start: 2020-01-15 | End: 2020-01-21 | Stop reason: HOSPADM

## 2020-01-15 RX ORDER — AMOXICILLIN 250 MG
1-4 CAPSULE ORAL 2 TIMES DAILY
Status: DISCONTINUED | OUTPATIENT
Start: 2020-01-15 | End: 2020-01-15

## 2020-01-15 RX ORDER — LATANOPROST 50 UG/ML
1 SOLUTION/ DROPS OPHTHALMIC DAILY
Status: DISCONTINUED | OUTPATIENT
Start: 2020-01-15 | End: 2020-01-21 | Stop reason: HOSPADM

## 2020-01-15 RX ORDER — ACETAMINOPHEN 325 MG/1
325-975 TABLET ORAL EVERY 6 HOURS PRN
Status: DISCONTINUED | OUTPATIENT
Start: 2020-01-15 | End: 2020-01-21 | Stop reason: HOSPADM

## 2020-01-15 RX ORDER — AMOXICILLIN 250 MG
1-4 CAPSULE ORAL 2 TIMES DAILY PRN
Status: DISCONTINUED | OUTPATIENT
Start: 2020-01-15 | End: 2020-01-21 | Stop reason: HOSPADM

## 2020-01-15 RX ORDER — SIMVASTATIN 20 MG
20 TABLET ORAL EVERY EVENING
Status: DISCONTINUED | OUTPATIENT
Start: 2020-01-15 | End: 2020-01-21 | Stop reason: HOSPADM

## 2020-01-15 RX ADMIN — AMLODIPINE BESYLATE 2.5 MG: 2.5 TABLET ORAL at 09:09

## 2020-01-15 RX ADMIN — LATANOPROST 1 DROP: 50 SOLUTION OPHTHALMIC at 21:48

## 2020-01-15 RX ADMIN — SIMVASTATIN 20 MG: 20 TABLET, FILM COATED ORAL at 21:48

## 2020-01-15 ASSESSMENT — ACTIVITIES OF DAILY LIVING (ADL)
TRANSFERRING: 0-->INDEPENDENT
ADLS_ACUITY_SCORE: 11
ADLS_ACUITY_SCORE: 11
WHICH_OF_THE_ABOVE_FUNCTIONAL_RISKS_HAD_A_RECENT_ONSET_OR_CHANGE?: AMBULATION;TRANSFERRING
DRESS: 0-->INDEPENDENT
FALL_HISTORY_WITHIN_LAST_SIX_MONTHS: NO
COGNITION: 0 - NO COGNITION ISSUES REPORTED
AMBULATION: 0-->INDEPENDENT
ADLS_ACUITY_SCORE: 11
RETIRED_EATING: 0-->INDEPENDENT
ADLS_ACUITY_SCORE: 11
TOILETING: 0-->INDEPENDENT
RETIRED_COMMUNICATION: 0-->UNDERSTANDS/COMMUNICATES WITHOUT DIFFICULTY
SWALLOWING: 0-->SWALLOWS FOODS/LIQUIDS WITHOUT DIFFICULTY
BATHING: 0-->INDEPENDENT

## 2020-01-15 ASSESSMENT — MIFFLIN-ST. JEOR: SCORE: 1705.36

## 2020-01-15 NOTE — DISCHARGE SUMMARY
[facility]  Discharge Summary        Good Petty MRN# [id number]   YOB: 1965 Age: 54 year old     Date of Admission:  1/11/2020  Date of Discharge:  1/15/2020  Admitting Physician:  Willem Mclain MD  Discharge Physician: Becka Rodgers MD  Discharging Service: Hospitalist     Primary Provider: [facility]  Primary Care Physician Phone Number: None         Discharge Diagnoses/Problem Oriented Hospital Course (Providers):    Good Petty was admitted on 1/11/2020 by Willem Mclain MD and I would refer you to their history and physical.  The following problems were addressed during his hospitalization:  Assessment & Plan     Good Petty is a 54 year old with hx of prediabetes and HL who was admitted on 1/11/2020 for spontaneous ICH     Acute to subacute left paracentral intraparenchymal hematoma with vasogenic edema  Presented with RLE numbness, tingling, and weakness. Head CT and brain MRI on arrival showed acute to subacute 3.2 cm intraparenchymal hematoma with the left paracentral lobule and a possible cavernoma within the lateral left postcentral gyrus. CTA head/neck showed non-specific mild narrowing of the Rt ICA. The patient denies history of trauma/falls. He reports a history of prediabetes and hyperlipidemia, but has no known history of hypertension. Neurocritical care was notified in the ED and medical management with strict blood pressure control was recommended. On admission, he was initiated on a nicardipine gtt.  - Repeat head CT today shows decrease in size of hemorrhage  - EEG completed this am, report pending. Urine drug screen ordered by Neuro.  - Neurology recommending cerebral angiogram as outpatient to evaluate for the cavernoma  As --needs repeat MRI brain with contrast in one month  --f/u stroke clinic in 6 weeks after MRI. If MRI negative for a cause, will need cerebral angiogram to be set up in the future to  investigating non-cavernomatous vascular malformation  [facility]      - IV hydralazine PRN for goal SBP<140. Has received IV hydralazine in last 24 hours, his BP has been good today. Will start him on amlodipine 2.5 mg daily.  - Neuro checks q4h  CT chest abdomen returned negative for malignancy      Sinus bradycardia  HR 50s-60s. Asymptomatic  Improved      Possible hypertension  TTE (1/11) showed mild to moderate LVH which is suggestive of uncontrolled hypertension, but patient's blood pressures have been acceptable thus far  - Monitor blood pressure for now  - IV hydralazine PRN for goal SBP<140  -starting amlodipine to avoid need of IV medicine now anticipating discharge in 1-2 days.  BP better controlled with oral norvasc 2.5mg PO daily now in the 110s to 120s today       Hyperlipidemia  Lipid panel on admission showed Tchol 203, , HDL 63. ASCVD risk 7.1%  - Started simvastatin 20 mg qhs     Prediabetes  A1c 6.0  - Blood sugars have been acceptable during this hospitalization  - Follow up with PCP     Glaucoma  - Continues on PTA eye drops     FEN: Regular diet  DVT Prophylaxis: Pneumatic Compression Devices  Code Status: Full Code     Disposition: to ARU today   Discussed with patient and bedside RN today      Becka Rodgers MD  Hospitalist         Code Status:      Full Code         Important Results:      See below          Pending Results:        Unresulted Labs Ordered in the Past 30 Days of this Admission     No orders found from 12/12/2019 to 1/12/2020.               Discharge Instructions and Follow-Up:      Follow-up Appointments     Follow-up and recommended labs and tests       Neurology follow up:    An appointment has been scheduled with Anupama Lord PA-C (Dr. Fisher to step   into appointment) for Monday February 17 at 8:45 a.m.  Please arrive 15   minutes early.  The clinic will mail a welcome packet to your home   address.  [facility]  [address],  "[address]  [place] [address]  [phone]    Neurology also recommends follow-up MRI be completed in 1 month (prior to   the Neurology appointment).  Please contact the [facility] if you are not contacted within the next week regarding   scheduling the MRI.                  Discharge Disposition:      Discharged to ARU         Discharge Medications:        Current Discharge Medication List      START taking these medications    Details   amLODIPine (NORVASC) 2.5 MG tablet Take 1 tablet (2.5 mg) by mouth daily  Qty: 30 tablet, Refills: 0    Associated Diagnoses: Other nontraumatic intracerebral hemorrhage, unspecified laterality (H)      simvastatin (ZOCOR) 20 MG tablet Take 1 tablet (20 mg) by mouth every evening  Qty: 30 tablet, Refills: 0    Associated Diagnoses: Other nontraumatic intracerebral hemorrhage, unspecified laterality (H)         CONTINUE these medications which have NOT CHANGED    Details   latanoprost (XALATAN) 0.005 % ophthalmic solution Place 1 drop into both eyes daily                  Allergies:       No Known Allergies         Consultations This Hospital Stay:      Consultation during this admission received from neurology         Condition and Physical Exam on Discharge:      Discharge condition: Stable   Discharge vitals: Blood pressure 133/89, pulse 64, temperature 97.6  F (36.4  C), temperature source Oral, resp. rate 16, height 1.803 m (5' 11\"), weight 82.6 kg (182 lb 3.2 oz), SpO2 99 %.     Constitutional: Alert and awake, NAD    Lungs: CTA   Cardiovascular: RRR   Abdomen: Soft, NT, ND, BS+   Skin: Warm and dry. RLE 4/5 power    Other:            Discharge Orders for Skilled Facility (from Discharge Orders):        After Care Instructions     Activity - Up ad roly      Advance Diet as Tolerated      Follow this diet upon discharge: low salt diet         General info for SNF      Length of Stay Estimate: Short Term Care: Estimated # of Days <30  Condition at Discharge: " Stable  Level of care:skilled   Rehabilitation Potential: Good  Admission H&P remains valid and up-to-date: Yes  Recent Chemotherapy: N/A  Use Nursing Home Standing Orders: Yes                    Rehab orders for Skilled Facility (from Discharge Orders):      Referrals     Future Labs/Procedures    Occupational Therapy Adult Consult     Comments:    Evaluate and treat as clinically indicated.    Reason:  weakness    Physical Therapy Adult Consult     Comments:    Evaluate and treat as clinically indicated.    Reason:  weakness             Discharge Time:      Greater than 30 minutes.        Image Results From This Hospital Stay (For Non-EPIC Providers):        Results for orders placed or performed during the hospital encounter of 01/11/20   MR Brain w/o & w Contrast    Narrative    MRI BRAIN WITHOUT AND WITH CONTRAST  1/11/2020 1:03 PM    HISTORY:  Right leg weakness, numbness, pain, rule out stroke.     TECHNIQUE:  Multiplanar, multisequence MRI of the brain without and  with 10 mL Gadavist.    COMPARISON: None.    FINDINGS:  A 2.1 x 3.2 x 2.1 cm (AP x TR x SI) centrally T2  hyperintense, T1 hyperintense peripherally T1 hypointense, T2  hypointense lesion is present centered within the left paracentral  lobule. There is corresponding peripheral susceptibility related  signal loss. A smaller punctate area of susceptibility related signal  loss is present within the left postcentral gyrus. Parenchyma is  otherwise unremarkable. Major intracranial flow voids are maintained.  The visualized calvarium, tympanic cavities, mastoid cavities, and  extra cranial soft tissues are unremarkable. Mild polypoid maxillary  sinus mucosal thickening is present.      Impression    IMPRESSION:  1. Mass within the left paracentral lobule measuring up to 3.2 cm,  most consistent with intraparenchymal hematoma acute/subacute on  chronic hemorrhage. Mild surrounding vasogenic edema. Underlying mass  (e.g..cavernoma) cannot be excluded.  Recommend follow-up MRI.  2. Smaller area of susceptibility related signal loss within the more  lateral left postcentral gyrus, suggestive of cavernoma.    URVASHI HILL MD   CT Head w/o Contrast    Narrative    CT SCAN OF THE HEAD WITHOUT CONTRAST   1/11/2020 2:06 PM     HISTORY: Abnormal brain MRI; rule out hematoma.    TECHNIQUE:  Axial images of the head and coronal reformations without  IV contrast material. Radiation dose for this scan was reduced using  automated exposure control, adjustment of the mA and/or kV according  to patient size, or iterative reconstruction technique.    COMPARISON: Head MRI 1/11/2020.    FINDINGS:  An ovoid area of hyperattenuation is present within the  left paracentral lobule measuring 1.5 x 2.8 x 2.2 cm by CT (AP x TR x  SI) corresponding to same day MRI findings. Mild surrounding visited  edema is present. Parenchyma is otherwise unremarkable. The size  calvarium, tympanic cavities, and mastoid cavities are unremarkable.      Impression    IMPRESSION: Acute/subacute intraparenchymal hematoma within the left  paracentral lobule with mild surrounding vasogenic edema. Recommend  continued follow-up.    URVASHI HILL MD   CTA Head Neck with Contrast    Narrative    CT ANGIOGRAM OF THE HEAD AND NECK WITH CONTRAST  1/11/2020 2:07 PM     HISTORY: Right leg weakness.    TECHNIQUE:  CT angiography with an injection of 70 mL Isouve-370 IV  with scans through the head and neck. Images were transferred to a  separate 3-D workstation where multiplanar reformations and 3-D images  were created. Estimates of carotid stenoses are made relative to the  distal internal carotid artery diameters except as noted. Radiation  dose for this scan was reduced using automated exposure control,  adjustment of the mA and/or kV according to patient size, or iterative  reconstruction technique.    COMPARISON: None.     CT ANGIOGRAM HEAD FINDINGS:  The left vertebral artery predominately  terminates in  posterior inferior cerebellar artery. The right  vertebral artery, basilar artery, and posterior cerebral arteries are  patent. The internal carotid arteries, intracerebral arteries, and  middle cerebral arteries are patent. No evidence of aneurysm or  vascular malformation. A questionable punctate area of enhancement is  present within or along the margin of the hematoma centered within the  left paracentral lobule, probably representing a displaced venous  structure (series 9 image 71).    CT ANGIOGRAM NECK FINDINGS: A three-vessel aortic arch is present. The  bilateral common carotid, internal carotid, external carotid, and  vertebral arteries are patent. There is slight irregularity at the  right carotid bifurcation with mild narrowing of the right internal  carotid artery at the origin related to eccentric noncalcified  material (series 10 image 73).     Mild lower cervical spine degenerative change is present. No  periapical dental abscesses are identified.      Impression    IMPRESSION:   1. Punctate (1 mm) area of enhancement along the margin of the  intraparenchymal hematoma centered within the left paracentral lobule.  Differential diagnosis includes normal displaced cortical vein  (favored) or less likely punctate area of contrast  extravasation/puddling. Tiny underlying vascular abnormality cannot be  excluded.  2. Mild narrowing of the right internal carotid artery at the  bifurcation related to eccentrically located noncalcified soft tissue  which may represent noncalcified plaque or adherent thrombus.    Findings were discussed by phone between Dr. Mcbride and Dr. Harvey  2:21 PM on 1/11/2020.    URVASHI MCBRIDE MD   XR Chest Port 1 View    Narrative    CHEST PORTABLE ONE VIEW   1/11/2020 4:55 PM     HISTORY: HTN.    COMPARISON: None.    FINDINGS:  The lungs are clear. No pleural effusions or pneumothorax.  Heart size and pulmonary vascularity are within normal limits. No  acute fracture. The  costophrenic angles are not completely included  and cannot be completely evaluated.      Impression    IMPRESSION: Costophrenic angles are not completely included and cannot  be completely evaluated. No evidence of acute cardiopulmonary disease  is seen.    JAN PETTY MD   CT Head w/o Contrast    Narrative    CT SCAN OF THE HEAD WITHOUT CONTRAST   1/12/2020 10:09 AM     HISTORY: Hemorrhage.    TECHNIQUE:  Axial images of the head and coronal reformations without  IV contrast material. Radiation dose for this scan was reduced using  automated exposure control, adjustment of the mA and/or kV according  to patient size, or iterative reconstruction technique.    COMPARISON: Head CT 1/11/2020    FINDINGS:  Intraparenchymal hematoma centered within the left  paracentral lobule is decreased in size compared to the prior exam  currently measuring approximately 1.5 x 2.5 x 1.8 cm (AP x TR x SI).  Mild surrounding vasogenic edema is present, similar compared to the  prior exam. No evidence of new hemorrhage. Parenchyma is otherwise  unremarkable. The calvarium, tympanic cavities, and mastoid cavities  are unremarkable.      Impression    IMPRESSION:  Decreased size of intraparenchymal hematoma centered  within the left paracentral lobule.    URVASHI HILL MD   CT Chest/Abdomen/Pelvis w Contrast    Narrative    CT CHEST, ABDOMEN, AND PELVIS WITH CONTRAST  DATE/TIME:    1/13/2020    INDICATION: Cortical hemorrhage in the brain of unclear etiology,  possible brain metastasis  COMPARISON: None  TECHNIQUE: CT scan of the chest and abdomen was performed following  injection of IV contrast. Multiplanar reformats were obtained. Dose  reduction techniques were used.   CONTRAST: 90 mL Isovue-370    FINDINGS:     LUNGS AND PLEURA: 2 mm diminutive pulmonary nodule in the left lower  lobe on image 51 is very likely benign. The lungs are otherwise clear.    MEDIASTINUM/AXILLAE: No lymphadenopathy.    HEPATOBILIARY: Probable cysts in  both the right and left hepatic lobe.  Otherwise normal liver and gallbladder.    PANCREAS: Normal.    SPLEEN: Normal.    ADRENAL GLANDS: Normal.    KIDNEYS: Normal.    BOWEL: Normal.    LYMPH NODES: Normal.    VASCULATURE: Unremarkable.    MUSCULOSKELETAL: Normal.    OTHER: None.      Impression    IMPRESSION:  1.  No evidence of malignancy in the chest, abdomen, or pelvis.  2.  Probable hepatic cysts. Ultrasound could confirm as needed.    JAN MARTINS MD   Echocardiogram Complete    Narrative    [id number]  [id number]  [id number]  [id number]^TAYLOR^DIANA^ABEL           [facility]  Echocardiography Laboratory  [address] [place], [place] [address]        Name: VARUN VAZQUEZ  MRN: [id number]  : 1965  Study Date: 2020 08:05 AM  Age: 54 yrs  Gender: Male  Patient Location: [facility]  Reason For Study: Hypertension (HTN)  Ordering Physician: DIANA VAZQUEZ  Performed By: Adriano Marley     BSA: 2.0 m2  Height: 71 in  Weight: 185 lb  HR: 62  BP: 121/93 mmHg  _____________________________________________________________________________  __        Procedure  Complete Portable Echo Adult.  _____________________________________________________________________________  __        Interpretation Summary     Left ventricular systolic function is normal. The visual ejection fraction is  estimated at 55-60%.  There is mild to moderate concentric left ventricular hypertrophy but a normal  left atrial size.  Diastolic Doppler findings (E/E' ratio and/or other parameters) suggest left  ventricular filling pressures are normal.  There is no comparison study available.  _____________________________________________________________________________  __        Left Ventricle  The left ventricle is normal in size. There is mild to moderate concentric  left ventricular hypertrophy. Left ventricular systolic function is normal.  The visual ejection fraction is estimated at 55-60%. Diastolic  Doppler  findings (E/E' ratio and/or other parameters) suggest left ventricular filling  pressures are normal. No regional wall motion abnormalities noted.     Right Ventricle  The right ventricle is normal size. The right ventricular systolic function is  normal. The right ventricle is not well visualized.     Atria  Normal left atrial size. Borderline right atrial enlargement. There is no  atrial shunt seen.     Mitral Valve  Thickened mitral valve anterior leaflet. There is trace mitral regurgitation.        Tricuspid Valve  There is trace tricuspid regurgitation. The right ventricular systolic  pressure is approximated at 12.3 mmHg plus the right atrial pressure. IVC  diameter and respiratory changes fall into an intermediate range suggesting an  RA pressure of 8 mmHg.     Aortic Valve  No aortic regurgitation is present. No hemodynamically significant valvular  aortic stenosis.     Pulmonic Valve  There is no pulmonic valvular stenosis.     Vessels  Normal size aorta.     Pericardium  The pericardium appears normal.        Rhythm  Sinus rhythm was noted.  _____________________________________________________________________________  __  MMode/2D Measurements & Calculations     IVSd: 1.5 cm  LVIDd: 4.6 cm  LVIDs: 3.1 cm  LVPWd: 1.2 cm  FS: 31.8 %  LV mass(C)d: 241.8 grams  LV mass(C)dI: 118.5 grams/m2  Ao root diam: 3.7 cm  asc Aorta Diam: 3.5 cm  LVOT diam: 2.2 cm  LVOT area: 3.7 cm2  LA Volume (BP): 55.3 ml  LA Volume Index (BP): 27.1 ml/m2  RWT: 0.54           Doppler Measurements & Calculations  MV E max osmin: 50.9 cm/sec  MV A max osmin: 47.3 cm/sec  MV E/A: 1.1  MV dec slope: 152.1 cm/sec2  MV dec time: 0.33 sec  PA acc time: 0.13 sec  TR max osmin: 175.1 cm/sec  TR max P.3 mmHg  Pulm Sys Osmin: 66.9 cm/sec  Pulm Mendez Osmin: 45.6 cm/sec  Pulm S/D: 1.5  E/E' av.3  Lateral E/e': 3.9  Medial E/e': 6.7              _____________________________________________________________________________  __        Report  approved by: Traci Posey 01/12/2020 09:30 AM              Most Recent Lab Results In EPIC (For Non-EPIC Providers):    Most Recent 3 CBC's:  Recent Labs   Lab Test 01/13/20  0830 01/11/20  1057   WBC 8.4 4.4   HGB 14.2 15.2   MCV 81 81    218      Most Recent 3 BMP's:  Recent Labs   Lab Test 01/13/20  0830 01/11/20  1057    139   POTASSIUM 3.8 4.2   CHLORIDE 110* 104   CO2 30 33*   BUN 18 14   CR 1.01 1.02   ANIONGAP 2* 2*   JULIA 8.3* 9.2   * 107*     Most Recent 3 Troponin's:No lab results found.    Invalid input(s): TROP, TROPONINIES  Most Recent 3 INR's:  Recent Labs   Lab Test 01/12/20  0425 01/11/20  1057   INR 1.05 0.98     Most Recent 2 LFT's:  Recent Labs   Lab Test 01/11/20  1057   AST 27   ALT 41   ALKPHOS 67   BILITOTAL 0.5     Most Recent Cholesterol Panel:  Recent Labs   Lab Test 01/12/20  0425   CHOL 203*   *   HDL 63   TRIG 45     Most Recent 6 Bacteria Isolates From Any Culture (See EPIC Reports for Culture Details):No lab results found.  Most Recent TSH, T4 and HgbA1c:  Recent Labs   Lab Test 01/11/20  1057   A1C 6.0*

## 2020-01-15 NOTE — PROGRESS NOTES
Rehab Admissions:  Met with the pt at the request of care transitions as PT/OT rec [facility]. Pt is agreeable to [facility]. Auth received. CM set up ride for today, 12 PM, via spouse. Pt educated in intensive therapies, close medical management, and rehabilitative nursing care available at [facility]. Patient accepted by PMR.     Thank you for the referral, we will continue to follow this patient for post acute placement.     Determination of admission is based upon the patient's need for an intensive, interdisciplinary approach to rehabilitation, their ability to progress, their ability to tolerate intensive therapies, their need for daily physician supervision, their need for twenty four hour nursing assistance, and their ability and willingness to participate in such a program.    Kannan Whitman CM  Rehab Liaison/  [facility]  1/15/2020    10:25 AM

## 2020-01-15 NOTE — PLAN OF CARE
Discharge Planner PT   Patient plan for discharge: [facility]  Current status: Gait x 60 ft witout assitive device and CGA for safety, ambuatled another 100 ft using RW and SBA. Pt noted with guarded, steppage gait. Partiicpated in standing LE there ex with PT's guidance. R LE weakness noted with L standing abduction.   Barriers to return to prior living situation: Impaired strength and balance, assistance needed with mobility    Recommendations for discharge: [facility]  Rationale for recommendations: Pt will benefit from intense therapy at [facility] to optimize IND with functional mobility as pt below baseline. Predict pt can/will be able to tolerate 3 hrs of therapy/day        Entered by: Jordyn Kasper 01/15/2020 11:13 AM       Physical Therapy Discharge Summary    Reason for therapy discharge:    Discharged to acute rehabilitation facility.    Progress towards therapy goal(s). See goals on Care Plan in [facility] electronic health record for goal details.  Goals not met.  Barriers to achieving goals:   discharge from facility.    Therapy recommendation(s):    Continued therapy is recommended.  Rationale/Recommendations:  to achieve PLOF and independence. .

## 2020-01-15 NOTE — CONSULTS
Care Transition Initial Assessment - RN        Met with: Patient.  DATA   Active Problems:    ICH (intracerebral hemorrhage) (H)       Cognitive Status: awake, alert and oriented.     Contact information and PCP information verified: Yes  Lives With: spouse, child(zeenat), dependent   Living Arrangements: house     Insurance concerns: No Insurance issues identified  ASSESSMENT  Patient currently receives the following services:  N/A        Identified issues/concerns regarding health management:  Patient admitted for acute ICH.  He has been evaluated by PT and OT.  The recommended discharge disposition is [facility].  Discussed this with patient and he was in agreement.  He lives in [place] with his spouse and 2 dependent children.  He agrees with [facility] and he also called his wife Marycruz and she was also in agreement.  His wife will be able to transport him.      Patient also needs a new PCP.  He states he has been in the [facility] system in the past and would like a clinic close to his home.  He was given a list of the [facility] and he will choose clinic and schedule follow up after discharge from [facility].    [facility] has clinically accepted patient and received insurance authorization.  Contacted patient's spouse and she will transport patient to [facility] at 1200 today.  Patient updated with this information.    Neurology follow up scheduled at [facility].  At schedulers request, referral form from clinic's website completed and faxed to [phone] attn: Marely indicating need for MRI prior to scheduled appointment.    PLAN  Financial costs for the patient include TBD.  Patient given options and choices for discharge yes .  Patient/family is agreeable to the plan?  Yes  Patient anticipates discharging to [facility] .     Patient anticipates needs for home equipment: Does not know  Transportation/person available to transport on day of discharge  is spouse and have they been notified/set up for  1200.  Plan/Disposition: ARF     Appointments:   Neurology follow up:     An appointment has been scheduled with Anupama Lord PA-C (Dr. Fisher to step into appointment) for Monday February 17 at 8:45 a.m.  Please arrive 15 minutes early.  The clinic will mail a welcome packet to your home address.   [facility]   [address], [address]   [place] [address]   [phone]     Neurology also recommends follow-up MRI be completed in 1 month (prior to the Neurology appointment).  Please contact the [facility] if you are not contacted within the next week regarding scheduling the MRI.             Care  (CTS) will continue to follow as needed.    Claudia Cuellar RN, BSN, PHN  Inpatient Care Coordination  [facility]  Phone: [phone]

## 2020-01-15 NOTE — PLAN OF CARE
Pt arrived to the unit at about 1345 in a wheel chair and admitted to room 509. He was alert and oriented x 4, he denied pain on arrival. Pt/ wife were oriented to the room, call light system, bed control, meal times and routines of the unit. He needs PTA, admission profile, and skin assessment over the next 24 hrs.

## 2020-01-15 NOTE — DISCHARGE INSTRUCTIONS
Your risk factors for stroke or TIA (transient ischemic attack):    Your Risk Factors Your Results Normal Ranges   High blood pressure BP Readings from Last 1 Encounters:   01/15/20 133/89    Less than 120/80  Continue taking amlodipine for BP control.    Cholesterol             Total Lab Results   Component Value Date    CHOL 203 01/12/2020      Less than 150  Continue taking Zocor and working on lifestyle changes to control your cholesterol and triglyceride levels.    Triglycerides   Lab Results   Component Value Date    TRIG 45 01/12/2020    Less than 150   LDL Lab Results   Component Value Date     01/12/2020       Less than 70   HDL Lab Results   Component Value Date    HDL 63 01/12/2020            Greater than 40 (men)  Greater than 50 (women)   Diabetes Recent Labs   Lab 01/13/20  0830   *    Fasting blood glucose   Set up a primary care physician and follow their instructions for further monitoring of your blood glucose levels.   Smoking/tobacco use N/A Quit smoking and tobacco   Overweight Follow instructions from your primary care provider for safe weight loss. Lose 1-2 pounds a week   Lack of exercise Increase strength and endurance with therapy, then gradually increase level of activity beginning with daily walking. 30 minutes moderate activity each day   Other risk factors include carotid (neck) artery disease, atrial fibrillation and stress. You may be on new medicine to treat high blood pressure, cholesterol, diabetes or atrial fibrillation.    Understanding Stroke Booklet given to patient. Please refer to booklet for further information.    Stroke warning signs and symptoms - CALL 911 right away for:  - Sudden numbness or weakness in the face, arm or leg (often on one side of the body).  - Sudden confusion or trouble understanding what is going on.  - Sudden blurred or decreased vision in one or both eyes.  - Sudden trouble speaking, loss of balance, dizziness or problems with  coordination.  - Sudden, severe headache for no reason.  - Fainting or seizures.  - Symptoms may go away then come back suddenly.

## 2020-01-15 NOTE — H&P
[facility]   Acute Rehabilitation Unit  Admission History and Physical    CHIEF COMPLAINT   Right lower extremity weakness      HISTORY OF PRESENT ILLNESS  Good Petty is a 54 year old left hand dominant male who had been told he has prediabetes and an elevated cholesterol.  No history of hypertension, stroke, heart disease, tobacco or  Alcohol /illicit drugs use..  Pt stated, never been admitted to a hospital, no prescription medications.   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    He  had been feeling totally well lately and felt well when he went to bed the night of 1/11/20.  He said that  at exactly 7:25 AM, he developed pins-and-needles as well as less heaviness in his right leg, from his buttock down to the tips of his toes.  He had some difficulty moving the right leg.  He had no headache, dizziness, change in vision, difficulty speaking or understanding.  He had no difficulty swallowing or moving his upper extremities.  He had no difficulty or numbness in the left lower extremity and no back pain.  He went to [facility] where his vital signs were stable and he was normotensive.  He was sent to the [facility] emergency department to be evaluated.    In the emergency CT of the head without contrast showed an acute to subacute intraparenchymal hematoma in the left paracentral lobule with mild surrounding vasogenic edema.  MRI of the brain without and with contrast showed a mass within the left paracentral lobule measuring up to 3.2 cm consistent with an intraparenchymal hematoma which was acute to subacute.  There was mild surrounding vasogenic edema.  Labs were unremarkable.  The patient was seen by the stroke neurologist, got admitted to the intensive care unit for blood pressure control and further investigation.    During his acute hospitalization, patient was seen and evaluated by  PT, and  "OT.  All specialties collectively recommended that patient would benefit from ongoing therapies in the acute inpatient rehabilitation setting.      In review of the therapy notes,   Per OT: \"Pt spv for transfers w/ 1 VC for safe hand placement. Functional mobility w/in room using 2WW w/ SBA, no LOB. Oral-care at sink w/ SBA. Completed standing activity > 10 min focusing on reaching high/low out of BLOSSOM, tolerated well w/ no overt LOB\".    Per PT: \"Pt ambulated 180 + 80' with FWW and CGA, cues fo ripmroved gait mechanics as pt demonstrates impairments on RLE with gait, no LOB. Pt most limited by impaired sensation and coordination RLE\".       Currently, the patient is medically appropriate and is assessed to have needs and will benefit from an inpatient acute rehabilitation comprehensive program working with PT, OT, and will also benefit from supervision and management of Rehab Nursing and Rehab MD.       PAST MEDICAL HISTORY  Past Medical History:   Diagnosis Date     Anxiety      Prediabetes      Shoulder capsulitis, right        SURGICAL HISTORY  Past Surgical History:   Procedure Laterality Date     HC TOOTH EXTRACTION W/FORCEP       VASECTOMY         SOCIAL HISTORY  Marital Status: maried  Living situation: living with wife and 2 teenage boys in a town home. No stair to enter but~ 16 to get to the bedroom which has full bath. The main level has half bath.  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   Social Needs     Financial resource strain: Not on file     Food " insecurity:     Worry: Not on file     Inability: Not on file     Transportation needs:     Medical: Not on file     Non-medical: Not on file   Tobacco Use     Smoking status: Never Smoker   Substance and Sexual Activity     Alcohol use: Never     Frequency: Never     Drug use: Not on file     Sexual activity: Not on file   Lifestyle     Physical activity:     Days per week: Not on file     Minutes per session: Not on file     Stress: Not on file   Relationships     Social connections:     Talks on phone: Not on file     Gets together: Not on file     Attends Nondenominational service: Not on file     Active member of club or organization: Not on file     Attends meetings of clubs or organizations: Not on file     Relationship status: Not on file     Intimate partner violence:     Fear of current or ex partner: Not on file     Emotionally abused: Not on file     Physically abused: Not on file     Forced sexual activity: Not on file   Other Topics Concern     Not on file   Social History Narrative     Not on file       FAMILY HISTORY  Family History   Problem Relation Age of Onset     Cerebrovascular Disease Mother      Cerebrovascular Disease Maternal Grandmother          PRIOR FUNCTIONAL HISTORY   Pt was independent with all ADLs/IADLs, transfers, mobility and gait.      MEDICATIONS  Medications Prior to Admission   Medication Sig Dispense Refill Last Dose     [START ON 1/16/2020] amLODIPine (NORVASC) 2.5 MG tablet Take 1 tablet (2.5 mg) by mouth daily 30 tablet 0      latanoprost (XALATAN) 0.005 % ophthalmic solution Place 1 drop into both eyes daily   1/9/2020 at PM     simvastatin (ZOCOR) 20 MG tablet Take 1 tablet (20 mg) by mouth every evening 30 tablet 0        ALLERGIES   No Known Allergies      REVIEW OF SYSTEMS  A 10 point ROS was performed and negative unless otherwise noted in HPI.     Constitutional: denies any fevers, chills.   Eyes: denies changes in visual acuity   Ears, Nose, Throat: denies any difficulty  "swallowing   Cardiovascular: denies any exertional chest pain or palpitation   Respiratory: denies dyspnea   Gastrointestinal: denies any nausea, vomiting, abdominal pain, diarrhea or constipation   Genitourinary: denies any dysuria, hematuria, frequency or urgency. Hx of up 2x/night for urination due to drinking most of daily fluid evening time  Musculoskeletal: denies any muscle pain, joint pain, neck pain or back pain   Neurologic: denies any headache, changes in motor or sensory function, no loss of balance or vertigo   Psychiatric: denies mood changes; sleeps OK , Hx of OCD/anxiety        PHYSICAL EXAM  VITAL SIGNS:  /81 (BP Location: Right arm)   Pulse 80   Temp 98.2  F (36.8  C) (Oral)   Resp 16   Ht 1.803 m (5' 11\")   Wt 84.8 kg (187 lb)   SpO2 97%   BMI 26.08 kg/m    BMI:  Estimated body mass index is 26.08 kg/m  as calculated from the following:    Height as of this encounter: 1.803 m (5' 11\").    Weight as of this encounter: 84.8 kg (187 lb).     General: NAD, pleasant and cooperative   HEENT: ATNC, oropharynx clear with MMM, EOMI, PERRL    Pulmonary: clear breath sounds b/l, no rales/wheezing    Cardiovascular: RRR, no murmur   Abdominal: soft, non-tender, non-distended   Extremities: warm, well perfused, no edema in bilateral lower extremities, no tenderness in calves   MSK/neuro:   Mental Status:  alert and oriented x3    Cranial Nerves: grossly normal   1. 2nd CN: Pupils equal, round, reactive to light and accomodation. and visual fields intact to confrontation.   2. 3rd,4th,6th CN:  EOMI, appropriate pupillary responses  3. 5th CN: facial sensation intact   4. 7th CN: face symmetrical   5. 8th CN: functional hearing bilaterally  6. 9th, 10th CN: palate elevates symmetrically   7. 11th CN: sternocleidomastoids and trapezii strong   8. 12th CN: tongue midline and without fasciculations     Sensory: diminished to LT in RLE from knee down to her toes    Strength:    SF  EF  EE  WE  G  I  HF "  KE  DF  EHL  PF   L  5 5 5 5 5 5 5 5 5 5 5  R  5 5 5 5 5 5 4 4 4 4 4    Reflexes: Present and symmetrical    Tone per modified Gabbi Scale:    Abnormal movements: None    Coordination: No dysmetria on finger to nose b/l    Speech: clear and understanable   Cognition: appears to be intact   Gait:  therapy to assess    Skin: no bruising or bleeding      LABS  Pertinent labs reviewed    Lab Results   Component Value Date    WBC 8.4 01/13/2020    HGB 14.2 01/13/2020    HCT 45.6 01/13/2020    MCV 81 01/13/2020     01/13/2020     Lab Results   Component Value Date     01/13/2020    POTASSIUM 3.8 01/13/2020    CHLORIDE 110 (H) 01/13/2020    CO2 30 01/13/2020     (H) 01/13/2020     Lab Results   Component Value Date    GFRESTIMATED 83 01/13/2020    GFRESTBLACK >90 01/13/2020     Lab Results   Component Value Date    AST 27 01/11/2020    ALT 41 01/11/2020    ALKPHOS 67 01/11/2020    BILITOTAL 0.5 01/11/2020     Lab Results   Component Value Date    INR 1.05 01/12/2020     Lab Results   Component Value Date    BUN 18 01/13/2020    CR 1.01 01/13/2020         ASSESSMENT/PLAN:  Good Petty is a 54 year old left hand dominant male who was admitted on 1/11 with right lower weakness and numbness due to ICH in the left paracentral lobule. PMHx of pre-DM, HLD, HTN and glucoma .  Admitted to acute inpatient rehab 01/15/20  .    Impairment group code: 01.2 R body involvement stroke         1. PT, and OT 90 minutes of each on a daily basis, in addition to rehab nursing and close management of physiatrist.      2. Impairment of ADL's:  OT for 90 minutes daily to work on ADL re-training such as grooming, self cares and bathing.      3. Impairment of mobility:  PT for 90 minutes daily to work on neuromuscular re-education focusing on strength, balance, coordination, and endurance.        5.   Rehab RN for med administration, bowel regimen, glucose monitoring and education.    4. Medical Conditions  L frontal  lobe ICH: ICH score of 0.  Unclear etiology at this time. Causes include: HTN (less likely), cavernoma, stroke with hemorrhagic tumor, mets, endocarditis, others. He has no significant HTN history. CTA showed no underlying vascular malformation. TTE unremarkable. CT chest abdomen pelvis with contrast negative for malignancy.  --SBP <140; on amlodipine 2.5 daily and prn hydralazine   --f/u vEEG read - initial negative.   --PT/OT  --needs repeat MRI brain with contrast in one month  ( ~ 2/14/20)  --f/u stroke clinic in 6 weeks after MRI. If MRI negative for a cause, will need cerebral angiogram to be set up in the future for investigating non-cavernomatous vascular malformation per nwurology       Sinus bradycardia- improving  HR 50s-60s. Asymptomatic  -monitor     Possible hypertension  TTE (1/11) showed mild to moderate LVH which is suggestive of uncontrolled hypertension, but patient's blood pressures have been acceptable thus far  - Monitor blood pressure   -  hydralazine PRN for goal SBP<140  -cont.  amlodipine  2.5mg PO daily    Hyperlipidemia  Lipid panel on 1/12, showed Tchol 203, , HDL 63. ASCVD risk 7.1%  - Started simvastatin 20 mg qhs     Prediabetes  A1c 6.0 on 1/11  - Blood sugars have been acceptable   - Follow up with PCP     Glaucoma  - Continues Latanoprost, PTA eye drops      5. Adjustment to disability:  Clinical psychology to eval and treat as needed  6. FEN: regular with thin liquid  7. Bowel: continent, PRN senokot   8. Bladder: continent and check PVR  9. DVT Prophylaxis: ambulating 300 feet with FWW  10. GI Prophylaxis: none  11. Code: Full  Disposition: Home with outpatient therapy  12. ELOS:  5 days.  13. Rehab prognosis:  good  14. Follow up Appointments on Discharge:   -PCP in 1 week for  Prediabetes   -PM &R in 4-6 weeks  needs repeat MRI brain with contrast in one month  ( ~ 2/14/20)  --f/u stroke clinic in 6 weeks after MRI. If MRI negative for a cause, will need cerebral  angiogram to be set up in the future to investigating non-cavernomatous vascular malformation      Keira Giles, CNP  Physical Medicine & Rehabilitation      Physician Attestation   I, Lisseth Emerson, saw and evaluated Good Petty as part of a shared visit.  I have reviewed and discussed with the advanced practice provider their history, physical and plan.    I personally reviewed the vital signs, medications, labs and imaging.    My key history or physical exam findings:   Key management decisions made by me:   Good Petty is a 55 yo left hand-dominant male with left paracentral IPH.      Comorbid medical conditions being managed: sinus bradycardia, HTN, HLD and prediabetes      He I with all aspects of his life. Currently requires CGA for ambulation with FWW and most ADLs. No cognitive/linguistic deficits and no dysphagia.      Anticipate improvement to mod I level with short course of rehab.      Will benefit from intensive rehabilitation includin minutes each of PT and OT  Rehabilitation nursing  Close management by physiatry     Prognosis: good medical and rehab prognosis      Estimated length of stay: 5 days.        Lisseth Emerson  Date of Service (when I saw the patient): 01/15/20

## 2020-01-15 NOTE — PLAN OF CARE
Neuros intact except RLE weakness/numbness, VSS, tele SD, SBP remained below 140, denies pain, reg diet, up with 1 GB/walker to bathroom, patient scoring green on aggression stoplight tool, plan to discharge to ARU pending.

## 2020-01-15 NOTE — PLAN OF CARE
Occupational Therapy Discharge Summary    Reason for therapy discharge:    Discharged to acute rehabilitation facility.    Progress towards therapy goal(s). See goals on Care Plan in [facility] electronic health record for goal details.  Goals partially met.  Barriers to achieving goals:   discharge from facility.    Therapy recommendation(s):    Continued therapy is recommended.  Rationale/Recommendations:  continued OT recommended to advance safety and indep with ADL, IADL and mobilities prior to return home.

## 2020-01-15 NOTE — PLAN OF CARE
A and O x4. Neuros intact except for RLE weakness, numbness, and ataxia. Up with 1, belt, walker. Voiding without difficulty. BS positive. LS clear. Good appetite on regular diet. VSS. Mild pain in shoulder. Discussed risk factors for hemorrhagic and ischemic stroke, pt requires more teaching regarding Zocor. Scoring green on aggression screening tool.

## 2020-01-15 NOTE — INTERIM SUMMARY
[facility] Pre-Admission Screen    Referral Source: [facility]  Admit date to referring facility: 1/11/2020    Rehab Diagnosis:    02.1 Non-Traumatic Acute to subacute left paracentral intraparenchymal hematoma with vasogenic edema    Justification for Acute Inpatient Rehabilitation  Good Petty is a 53 y/o male with past medical history of prediabetes and high cholesterol who was admitted with R LE parasthesia and weakness. He was found to have an acute to subacute left paracentral intraparenchymal hematoma with vasogenic edema. His stay also was found to have sinus bradycardia, possible hypertension, an A1c of 6.0, and HLD. Patient requires an intensive inpatient rehab program to address the following acute impairments:impaired strength, impaired activity tolerance, impaired balance, impaired coordination and impaired judgement and safety awareness    Current Active Medical Management Needs/Risks for Clinical Complications  The patient requires the high level of rehabilitation physician supervision that accompanies the provision of intensive rehabilitation therapy.  The patient needs the services of the rehabilitation physician to assess the patient medically and functionally and to modify the course of treatment as needed to maximize the patient's capacity to benefit from the rehabilitation process. The patient requires medical intervention at least 3 days per week to manage HTN to keep below 140 mmHg systolic-- currently on Norvasc; monitor neuro status changes in patient with new spontaneous ICH with R LE parasthesias; assess HR in patient with asymptomatic bradycardia; manage new HLD-- newly on simvastatin; assess BG as needed in patient with prediabetes with A1c of 6.0.    Past Medical/Surgical History   Surgery in the past 100 days: No   Additional relevant past medical history: Hyperlipidemia, prediabetes, glaucoma    Level of Functioning prior to Admission:  Home Environment  Lives with:  spouse, child(zeenat), dependent  Living arrangements: house  Home accessibility: stairs within home  Stairs to enter home:    Stairs to negotiate within home: other (see comments)(16)  Stair railings at home: railing on right side (ascending)  Living environment comments:pt lives with wife and adult children in a 2 story [place]  Equipment currently used at home:    Activity/exercise/self-care comment: Pt IND at basline     Ambulation: 0-->independent  Transferrin-->independent  Toiletin-->independent  Bathin-->independent  Dressin-->independent   Eatin-->independent  Communication: 0-->understands/communicates without difficulty  Swallowin-->swallows foods/liquids without difficulty  Cognition: 0 - no cognition issues reported  Prior Functional Level Comment: Pt IND with mobility prior to admit   Additional Comments: Pt lives with spouse and 2 dependent children    Current Level of Function grid: GG Scale  PT Most Recent Goals for Rehab   Bed Rolling 6 Independent 6 Independent   Supine to Sit 6 Independent 6 Independent   Sit to Stand 4 Supervision or touching assitance 6 Independent   Transfer 3 Partial/moderate assistance 6 Independent   Ambulation 3 Partial/moderate assistance 6 Independent   Stairs 0 Not completed 6 Independent     OT Most Recent Goals for Rehab   Feeding 6 Independent 6 Independent   Grooming 4 Supervision or touching assitance 6 Independent   Bathing 3 Partial/moderate assistance 6 Independent   Upper Body Dressing 0 Not completed 6 Independent   Lower Body Dressing 0 Not completed 6 Independent   Toileting 3 Partial/moderate assistance 6 Independent   Toilet Transfer 3 Partial/moderate assistance 6 Independent   Tub/Shower Transfer 0 Not completed 6 Independent   Cognitive A&O, no deficits noted NA     SLP Most Recent Goals for Rehab   Dysphagia No deficits noted NA       Summary Statement:  The patient is participating well in PT/OT and will tolerate 3 hours of therapy  per day. He currently walks 300 feet with FWW and CGA. He needs verbal cues for safety, walker management. He requires CGA with most standing ADLs. He is limited by R LE parasthesia affecting balance. He requires intensive therapies in the ARC setting to return home with family A for IADLs and ongoing OP therapies as needed.     Expected Therapies and Services required during Inpatient Rehab admission  Intensity of Therapy: Patient requires intensive therapies not available in a lesser level of care. Patient is motivated, making gains, and can tolerate 3 hours of therapy a day.  Physical Therapy: 90 minutes per day, at least 5 days a week for 5 days  Occupational Therapy: 90 minutes per day, at least 5 days a week for 5 days  Speech and Language Therapy: No SLP services needed at this time.   Rehabilitation Nursing Needs: Patient requires 24 hour Rehab Nursing to manage vitals, medication education, carryover of new rehab techniques and care coordination.    Precautions/restrictions/special needs:   Precautions: fall precautions   Restrictions: NA   Special Needs: NA    Expected Level of Improvement:: Anticipate with intensive therapies, close medical management, and rehabilitative nursing care the patient will improve strength, balance, tolerance to activity, safety to ensure independence with all basic mobility on level and stairs, as well as independence in performance of all basic ADLs to allow return home with family A for IADLs as needed, ongoing OP therapies if needed.   Expected Length of time to achieve: 5 days    Anticipated Discharge Needs:  Anticipated Discharge Destination: Home  Anticipated Discharge Support: Family member  24/7 support available : Yes  Identified caregiver(s):  Wife  Anticipated Discharge Needs: home with outpatient therapy    Identified challenges/barriers:  Stairs within home    Liaison Signature:        Physician statement of review and agreement:  I have reviewed and am in  agreement of the need for IRF stay to address above functional and medical needs. In addition to above statements address, Patient requires intensive active and ongoing therapeutic intervention and multiple therapies; Patient requires medical supervision; Expected to actively participate in the intensive rehab program; Sufficiently stable to actively participate; Expectation for measurable improvement in functional capacity or adaption to impairments.    Physician Signature/Date/Time:

## 2020-01-16 PROCEDURE — 97162 PT EVAL MOD COMPLEX 30 MIN: CPT | Mod: GP

## 2020-01-16 PROCEDURE — 97116 GAIT TRAINING THERAPY: CPT | Mod: GP

## 2020-01-16 PROCEDURE — 97166 OT EVAL MOD COMPLEX 45 MIN: CPT | Mod: GO

## 2020-01-16 PROCEDURE — 97530 THERAPEUTIC ACTIVITIES: CPT | Mod: GP

## 2020-01-16 PROCEDURE — 97112 NEUROMUSCULAR REEDUCATION: CPT | Mod: GO

## 2020-01-16 PROCEDURE — 97750 PHYSICAL PERFORMANCE TEST: CPT | Mod: GP

## 2020-01-16 PROCEDURE — 25000132 ZZH RX MED GY IP 250 OP 250 PS 637: Performed by: NURSE PRACTITIONER

## 2020-01-16 PROCEDURE — 97530 THERAPEUTIC ACTIVITIES: CPT | Mod: GO

## 2020-01-16 PROCEDURE — 12800006 ZZH R&B REHAB

## 2020-01-16 PROCEDURE — 40000187 ZZH STATISTIC PATIENT MED CONFERENCE < 30 MIN

## 2020-01-16 PROCEDURE — 40000183 ZZH STATISTIC PT MED CONFERENCE < 30 MIN

## 2020-01-16 PROCEDURE — 97535 SELF CARE MNGMENT TRAINING: CPT | Mod: GO

## 2020-01-16 RX ADMIN — LATANOPROST 1 DROP: 50 SOLUTION OPHTHALMIC at 21:12

## 2020-01-16 RX ADMIN — SIMVASTATIN 20 MG: 20 TABLET, FILM COATED ORAL at 21:08

## 2020-01-16 RX ADMIN — AMLODIPINE BESYLATE 2.5 MG: 2.5 TABLET ORAL at 09:58

## 2020-01-16 NOTE — PLAN OF CARE
FOCUS/GOAL  Mobility, Cognition/Memory/Judgment/Problem solving, and Reinforcement of self-care/ADL    ASSESSMENT, INTERVENTIONS AND CONTINUING PLAN FOR GOAL:  Pt is A&OX4, VSS and denies any pain. Pt has weakness to R side, Pt has numbness to R LE and states that the numbness had only extended up to knee but yesterday the numbness expanded up over the R knee to the buttocks and side. Pt is a CGA w/ walker amd is continent using toilet.

## 2020-01-16 NOTE — CONSULTS
20 1300   Living Arrangements   Lives With spouse;child(zeenat), dependent;child(zeenat), adult   Living Arrangements house  (town house)   Able to Return to Prior Arrangements yes   Home Safety   Patient Feels Safe Living in Home? yes   Discharge Planning   Expected Discharge Date 20   Patient/Family Anticipates Transition to home with family   Disposition Comments OP recommended    Concerns to be Addressed no discharge needs identified   Discharge Needs Assessment   Transportation Anticipated car, drives self;family or friend will provide     Social Work: Initial Assessment with Discharge Plan    Patient Name: Good Petty  : 1965  Age: 54 year old  MRN: [id number]  Completed assessment with: Chart review and interview with pt at bedside   Admitted to ARU: 01/15/2020    Presenting Information   Date of SW assessment: 2020  Health Care Directive: Provided education, Declined completing and Health Care Directive Agent (if patient not able to make decisions)  Primary Health Care Agent: Self  Secondary Health Care Agent: Pt wife  Living Situation: Town house with 0 YANETH and stairs to access bed/bath. Lives with wife and 2 teenage kids in the home.   Previous Functional Status: Independent with ALDs and IADLs. Drives and able to manage his own medications and finances. Denied any falls. Not connected with any community services.   DME available: None   Patient and family understanding of hospitalization: Appropriate   Cultural/Language/Spiritual Considerations: English-speaking, Confucianist not listed on face sheet       Physical Health  Reason for admission: Stroke     Provider Information   Primary Care Physician:[facility]--Pt working on establishing care with a new PCP. Offered to have HUC assist. Pt expressed appreciation but stated that he would rather select one and have his wife assist him. Pt aware that HUC can assist if he changes his mind.   : None  "reported.     Mental Health/Chemical Dependency:   Diagnosis: PT denied depression. Reported that he doesn't believe he has anxiety but acknowledged that he has OCD. Pt shared example of checking the door lock 5 times before he leaves the home. Pt stated that his wife tells him frequently that he has anxiety. Pt stated that he is not worried by this. Pt shared more recent stress at work. Pt stated that he went and saw a MD about his stress level and that he declined any medications. Pt reported that his family is emotionally supportive and he feels that he has the skills to manage. Pt denied HP, spiritual care or any resources/education.   Alcohol/Tobacco/Narcotis: Denied   Support/Services in Place: See above   Services Needed/Recommended: HP recommended, pt declining at this time   Sexuality/Intimacy: Not discussed     Support System  Marital Status: . Wife works full-time and does not plan to take any time off at time of pt discharge.   Family support: Pt has 5 kids total, two at home and 3 in [facility]. Kids at home are both in school. Pt reported good network of friends and family. Mom and sister in [place].   Other support available: See above.     Community Resources  Current in home services: None reported   Previous services: None reported     Financial/Employment/Education  Employment Status: Works full time in behavioral health field. Has sick time and wife assisting with disability paperwork. Pt reported that due to local stress and previous life goals, he plans to look elsewhere for work and his future.   Income Source: Salary   Education: Masters   Financial Concerns:  Denied-\"I'm doing ok\"  Insurance: [employer]       Discharge Plan   Patient and family discharge goal: Home with OP therapy   Provided Education on discharge plan: YES  Patient agreeable to discharge plan:  YES  Provided education and attained signature for Medicare IM and IRF Patient Rights and Privacy Information provided to " patient : N/A  Provided patient with [facility] Resources: YES  Barriers to discharge: None     Discharge Recommendations   Disposition: See above   Transportation Needs: Family assist   Name of Transportation Company and Phone: N/A    Additional comments   Pt appeared A&O and pleasant. Plan to discharge Tues 01/21 with OP therapy. Family able to support and assist. Pt denied any concerns about his discharge plans or ARU stay. SW available if needs arise. No immediate needs identified.     Please invite to Care Conference:  Not anticipated.       PRETTY Eisenberg, [facility] Acute Rehab Unit   Phone: [phone]  I   Pager: [phone]

## 2020-01-16 NOTE — PLAN OF CARE
FOCUS/GOAL  Bowel management, Bladder management, and Pain management    ASSESSMENT, INTERVENTIONS AND CONTINUING PLAN FOR GOAL:  Pt slept well overnight. No c/o pain. CGA, GB, and walker to transfer. LBM 1/14. Cont of bladder in toilet, PVR 30ml. RLE weakness.   A & Ox4. Using call light appropriately.

## 2020-01-16 NOTE — CARE CONFERENCE
Acute Rehab Care Conference/Team Rounds      Type: Team Rounds    Present: Dr. Jim Guzman, Keira Giles NP, Kannan Silva RN, Stephen Mejía PT, Wandy Neves OT, Marjorie Calderón [facility], Aline Dennis , Martha Horvath Dietician, Pam Camarillo, Good Petty Patient      Discharge Barriers/Treatment/Education    Rehab Diagnosis: CVA    Active Medical Co-morbidities/Prognosis: Pre-diabetes, HTN, HLD, sinus bradycardia    Safety:    Pain:    Medications, Skin, Tubes/Lines:    Swallowing/Nutrition:    Bowel/Bladder:    Psychosocial: . Living with wife and 2 teenage boys in a town home. No stair to enter but~ 16 to get to the bedroom which has full bath. 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 Denied mental health/substance use.     ADLs/IADLs: OT eval in progress. Pt is SBA with transfers, CGA with FWW mobility, and min A with dressing due to RLE weakness. Will have short ARU stay to progress to mod I to return home with OP OT and FWW vs SEC pending progress    Mobility: Eval pending. Per charts, STS FWW SBA, amb 180 ft with FWW CGA. Has stairs to navigate at home. Will need to determine DME for safe discharge home (FWW vs SEC).    Cognition/Language:    Community Re-Entry:  TBD    Transportation: requires assist from family/friends.    Decision maker: self    Plan of Care and goals reviewed and updated.    Discharge Plan/Recommendations    Fall Precautions: continue    Overall plan for the patient:   New admit yesterday but doing well thus far.  Evaluations completed, anticipate shorter stay.  Tentative discharge date of 1/21/20 to home with outpatient therapies.        Utilization Review and Continued Stay Justification    Medical Necessity Criteria:    For any criteria that is not met, please document reason and plan for discharge, transfer, or modification of plan of care to address.    Requires intensive  rehabilitation program to treat functional deficits?: Yes    Requires 3x per week or greater involvement of rehabilitation physician to oversee rehabilitation program?: Yes    Requires rehabilitation nursing interventions?: Yes    Patient is making functional progress?: Yes    There is a potential for additional functional progress? Yes    Patient is participating in therapy 3 hours per day a minimum of 5 days per week or 15 hours per week in 7 day period?:Yes    Has discharge needs that require coordinated discharge planning approach?:Yes      Barriers/Concerns related to meeting medical necessity criteria:  None    Team Plan to Address Concern:  N/A      Final Physician Sign off    Statement of Approval: I have reviewed and agree with the recommendations and documentation in this care conference note.       Patient Goals  Social Work Goals:Confirm discharge recommendations with therapy, coordinate safe discharge plan and remain available to support and assist as needed.    OT eval in progress, goals for pt to be mod I with ADLs and mobility with FWW.             Patient Goal:  Pain Management: Pt will demonstrate adequate pain management as evidenced by advocating for pain interventions including nonpharmacological prior to time of discharge. Pt denies pain at this time.     Patient/Family Goal: Medication Management: Pt will demonstrate adequate medication management as evidenced by passing Modified MAP or regular MAP prior to discharge. Pt knows what his medications are for but doesn't know the name of his BP medications.     Goal: Mobility: Pt will demonstrate adequate mobility as evidenced by being cleared fro independence in room with no falls prior to time of discharge. Pt uses call light and has strong and steady gait.

## 2020-01-16 NOTE — PROGRESS NOTES
"   01/16/20 0900   Quick Adds   Type of Visit Initial Occupational Therapy Evaluation   Living Environment   Lives With child(zeenat), dependent;spouse   Living Arrangements house   Home Accessibility stairs within home  (16 steps to bedroom/bathroom)   Transportation Anticipated car, drives self;family or friend will provide   Living Environment Comment Lives with spouse and 15 yo sons in [facility] with 16 steps to bedroom/bathroom with one railing. Tub shower with no grab bars, has strandard toilet. Has adult children that live nearby   Self-Care   Usual Activity Tolerance excellent   Current Activity Tolerance moderate   Regular Exercise Yes   Activity/Exercise Type walking;other (see comments)  (Bike and walking at [employer] daily)   Exercise Amount/Frequency daily   Equipment Currently Used at Home none  (Has crutches at home)   Activity/Exercise/Self-Care Comment Works full time, has high school aged sons, and likes to go to gym daily for light cardio   Functional Level   Ambulation 0-->independent   Transferring 0-->independent   Toileting 0-->independent   Bathing 0-->independent   Dressing 0-->independent   Eating 0-->independent   Communication 0-->understands/communicates without difficulty   Swallowing 0-->swallows foods/liquids without difficulty   Cognition 0 - no cognition issues reported   Fall history within last six months no   Which of the above functional risks had a recent onset or change? ambulation;transferring;toileting;bathing;dressing;fall history   Prior Functional Level Comment Pt was IND with all aspects of ADLs, IADLs, and community mobility. Had outpatient PT in past from work injury   General Information   Onset of Illness/Injury or Date of Surgery - Date 01/12/20   Referring Physician Jim Guzman MD   Patient/Family Goals Statement \"Get back to what I was doing before\"   Additional Occupational Profile Info/Pertinent History of Current Problem Pt is 53 yo M presents with " intraparenchymal hematoma in the left paracentral lobule with mild surrounding vasogenic edema and has RLE weakness. Left hand dominant male who had been told he has prediabetes and an elevated cholesterol.  No history of hypertension, stroke, heart disease, tobacco or  Alcohol /illicit drugs use..  Pt stated, never been admitted to a hospital, no prescription medications.   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 No vision changes since CVA   Sensory Examination   Sensory Comments Overall numbness in RLE/RUE, worsened RLE and R flank numbness during eval and progressed to 5/10 pain on R sided up to T7 nerve distrubution. Dull sensation in R side   Pain Assessment   Patient Currently in Pain No  (Reported 5/10 numbness pain in RLE/flank in PM tx)   Integumentary/Edema   Integumentary/Edema Comments Slight edema in RLE   Posture   Posture not impaired   Range of Motion (ROM)   ROM Comment BUE AROM WNL   Strength   Strength Comments Noted weakness in RUE. LUE MMT 4+/5, RUE MMT 4/5 distal and 4-/5 proximal   Hand Strength   Left hand  (pounds) 51 pounds   Right hand  (pounds) 91 pounds   Muscle Tone Assessment   Muscle Tone Quick Adds No deficits were identified;Right side extremities   Coordination   Upper Extremity Coordination No deficits were identified   Left hand, nine hole peg test (seconds) 20.3   Right hand, nine hole peg test (seconds) 24    Coordination Comments No concern of RUE coordination since CVA. Pt c/o ataxia in RLE and difficulty with control/strength of RLE   ARC Assessment Only   Acute Rehab Functional Assessment See IP Rehab Daily Documentation Flowsheet for Functional Mobility/ADL Assessment   Instrumental Activities of Daily Living (IADL)   Previous Responsibilities meal prep;housekeeping;shopping;medication management;finances;driving;work;   Activities of Daily Living Analysis   Impairments Contributing to Impaired Activities of Daily Living balance impaired;coordination impaired;flexibility decreased;motor control impaired;pain;sensation decreased;sensory feedback impaired;strength decreased   General Therapy Interventions   Planned Therapy Interventions ADL retraining;IADL retraining;balance training;motor coordination training;neuromuscular re-education;strengthening;transfer training;home program guidelines;progressive activity/exercise;risk factor education   Clinical Impression   Criteria for Skilled Therapeutic Interventions Met yes, treatment indicated   OT Diagnosis R sided weakness and sensory deficits resulting in functional decline in ADLs and mobility   Influenced by the following impairments balance impaired;coordination impaired;flexibility decreased;motor control impaired;pain;sensation decreased;sensory feedback impaired;strength decreased   Assessment of Occupational Performance 3-5 Performance Deficits   Identified Performance Deficits Transfers, mobility, dressing, toileting, bathing, meal prep, driving, work, community mobility   Clinical Decision Making (Complexity) Moderate complexity   Therapy Frequency Daily   Predicted Duration of Therapy Intervention (days/wks) 5 days   Anticipated Equipment Needs at Discharge other (see comments)  (FWW vs SEC pending progress)   Anticipated Discharge Disposition Home with Outpatient Therapy   Risks and Benefits of Treatment have been explained. Yes   Patient,  Family & other staff in agreement with plan of care Yes   Clinical Impression Comments Pt would benefit from skilled OT to address identified performance deficits to increase R sided strength/coordination and return to mod I with ADLs, IADLs, and mobility for safe return home   Total Evaluation Time   Total Evaluation Time (Minutes) 30

## 2020-01-16 NOTE — H&P
Post Admission Physician Evaluation:    I have compared Good Petty's condition on admission to acute rehabilitation to that outlined in the preadmission screen. History and physical exam performed by me. Patient was admitted with left paracentral IPH (impairment group code of 01.2) which is different from what is listed in PAS. Code 02.1 Non-Traumatic closed brain injury is not accurate.    No other significant differences are identified and the patient remains appropriate for an inpatient rehabilitation facility level of care to manage medical issues and address functional impairments due to (rehabilitation diagnosis) left paracentral IPH.      Comorbid medical conditions being managed: sinus bradycardia, HTN, HLD and prediabetes     Prior functional level: I with all aspects of his life    Present function: requires CGA for ambulation with FWW and most ADLs. No cognitive/linguistic deficits and no dysphagia.     Anticipated rehabilitation course: improvement to mod I level with short course of rehab.     Will benefit from intensive rehabilitation includin minutes each of PT and OT  Rehabilitation nursing  Close management by physiatry    Prognosis: good medical and rehab prognosis     Estimated length of stay: 5 days.     Lisseth Emerson MD  Physical Medicine & Rehabilitation

## 2020-01-16 NOTE — PROGRESS NOTES
01/16/20 1515   Signing Clinician's Name / Credentials   Signing clinician's name / credentials David Escalante [facility]   Functional Gait Assessment (BECCA Amato., Jennifer GKarlie F., et al. (2004))   1. GAIT LEVEL SURFACE 1  (17s w/ SPC)   2. CHANGE IN GAIT SPEED 2   3. GAIT WITH HORIZONTAL HEAD TURNS 2   4. GAIT WITH VERTICAL HEAD TURNS 2   5. GAIT AND PIVOT TURN 2   6. STEP OVER OBSTACLE 1  (Stepping over two shoe boxes w/ SPC)   7. GAIT WITH NARROW BASE OF SUPPORT 0  (8 steps using SPC)   8. GAIT WITH EYES CLOSED 1  (Deviated to Left >12in)   9. AMBULATING BACKWARDS 2   10. STEPS 2   Total Functional Gait Assessment Score   TOTAL SCORE: (MAXIMUM SCORE 30) 15     Patient used SPC for all items

## 2020-01-16 NOTE — PROGRESS NOTES
[facility]   Acute Rehabilitation Unit  Daily progress note    INTERVAL HISTORY  Pt was seen in team rounds.  No acute events overnight and he reports sleeping well.  Has no concerns or complaints at this time, and denies chest pain or shortness of breath.  PT and OT evaluations now completed, anticipated discharge date of 1/21 to home.  For full functional details see separate team rounds note from today.         MEDICATIONS  Scheduled:    amLODIPine  2.5 mg Oral Daily     latanoprost  1 drop Both Eyes Daily     simvastatin  20 mg Oral QPM        PRN:  acetaminophen, hydrALAZINE, senna-docusate       PHYSICAL EXAM  Patient Vitals for the past 24 hrs:   BP Temp Temp src Pulse Resp SpO2   01/16/20 1610 125/75 98.2  F (36.8  C) Oral 83 16 98 %   01/16/20 1552 122/77 -- -- 86 -- --   01/16/20 1527 124/77 -- -- 96 -- --   01/16/20 1156 125/79 -- -- 77 -- --   01/16/20 1129 129/81 -- -- 84 -- --   01/16/20 0900 117/76 -- -- -- -- --   01/16/20 0850 118/81 98.2  F (36.8  C) Oral 86 16 93 %   01/16/20 0118 126/81 97.3  F (36.3  C) Oral 63 16 99 %   01/15/20 1931 122/81 98.2  F (36.8  C) Oral 80 16 97 %   01/15/20 1713 131/73 98.5  F (36.9  C) Oral 77 -- 99 %       GEN: NAD, pleasant and cooperative  HEENT: NC/AT  CVS: RRR, S1+S2, no m/r/g  PULM: CTA b/l, no w/r/r  ABD: Soft, NT, ND, bowel sounds present  EXT: No LE edema or calf tenderness b/l  Neuro: Answers appropriately, follows commands    LABS  CBC RESULTS:   Recent Labs   Lab Test 01/13/20  0830   WBC 8.4   RBC 5.61   HGB 14.2   HCT 45.6   MCV 81   MCH 25.3*   MCHC 31.1*   RDW 14.9          Last Comprehensive Metabolic Panel:  Sodium   Date Value Ref Range Status   01/13/2020 142 133 - 144 mmol/L Final     Potassium   Date Value Ref Range Status   01/13/2020 3.8 3.4 - 5.3 mmol/L Final     Chloride   Date Value Ref Range Status   01/13/2020 110 (H) 94 - 109 mmol/L Final     Carbon Dioxide   Date Value Ref Range Status    01/13/2020 30 20 - 32 mmol/L Final     Anion Gap   Date Value Ref Range Status   01/13/2020 2 (L) 3 - 14 mmol/L Final     Glucose   Date Value Ref Range Status   01/13/2020 114 (H) 70 - 99 mg/dL Final     Urea Nitrogen   Date Value Ref Range Status   01/13/2020 18 7 - 30 mg/dL Final     Creatinine   Date Value Ref Range Status   01/13/2020 1.01 0.66 - 1.25 mg/dL Final     GFR Estimate   Date Value Ref Range Status   01/13/2020 83 >60 mL/min/[1.73_m2] Final     Comment:     Non  GFR Calc  Starting 12/18/2018, serum creatinine based estimated GFR (eGFR) will be   calculated using the Chronic Kidney Disease Epidemiology Collaboration   (CKD-EPI) equation.       Calcium   Date Value Ref Range Status   01/13/2020 8.3 (L) 8.5 - 10.1 mg/dL Final       Recent Labs   Lab 01/14/20  0151 01/13/20  0830 01/12/20  1239 01/12/20  0847 01/12/20  0411 01/12/20  0004 01/11/20  1951  01/11/20  1057   GLC  --  114*  --   --   --   --   --   --  107*   *  --  108* 112* 115* 143* 152*   < >  --     < > = values in this interval not displayed.           ASSESSMENT/PLAN:  Good Petty is a 54 year old left hand dominant male who was admitted on 1/11 with right lower weakness and numbness due to ICH in the left paracentral lobule. PMHx of pre-DM, HLD, HTN and glucoma .  Admitted to acute inpatient rehab 01/15/20  .    Impairment group code: 01.2 R body involvement stroke                                 1. PT, and OT 90 minutes of each on a daily basis, in addition to rehab nursing and close management of physiatrist.       2. Impairment of ADL's:  OT for 90 minutes daily to work on ADL re-training such as grooming, self cares and bathing.       3. Impairment of mobility:  PT for 90 minutes daily to work on neuromuscular re-education focusing on strength, balance, coordination, and endurance.          5.   Rehab RN for med administration, bowel regimen, glucose monitoring and education.     4. Medical  Conditions  L frontal lobe ICH: ICH score of 0.  Unclear etiology at this time. Causes include: HTN (less likely), cavernoma, stroke with hemorrhagic tumor, mets, endocarditis, others. He has no significant HTN history. CTA showed no underlying vascular malformation. TTE unremarkable. CT chest abdomen pelvis with contrast negative for malignancy.  --SBP <140; on amlodipine 2.5 daily and prn hydralazine   --needs repeat MRI brain with contrast in one month  ( ~ 2/14/20)  --f/u stroke clinic in 6 weeks after MRI. If MRI negative for a cause, will need cerebral angiogram to be set up in the future for investigating non-cavernomatous vascular malformation per nwurology        Sinus bradycardia- improving  HR 50s-60s. Asymptomatic  -monitor     Possible hypertension  TTE (1/11) showed mild to moderate LVH which is suggestive of uncontrolled hypertension, but patient's blood pressures have been acceptable thus far  - Monitor blood pressure   -  hydralazine PRN for goal SBP<140  -cont.  amlodipine  2.5mg PO daily     Hyperlipidemia  Lipid panel on 1/12, showed Tchol 203, , HDL 63. ASCVD risk 7.1%  - Started simvastatin 20 mg qhs     Prediabetes  A1c 6.0 on 1/11  - Blood sugars have been acceptable   - Follow up with PCP     Glaucoma  - Continues Latanoprost, PTA eye drops        5. Adjustment to disability:  Clinical psychology to eval and treat as needed  6. FEN: regular with thin liquid  7. Bowel: continent, PRN senokot   8. Bladder: continent.  PVRs x2 WNL, may check PRN only.   9. DVT Prophylaxis: Ambulation  10. GI Prophylaxis: none  11. Code: Full  Disposition: Home with outpatient therapy  12. ELOS:  Tentative discharge date 1/21  13. Rehab prognosis:  good  14. Follow up Appointments on Discharge:   -PCP in 1 week for  Prediabetes  needs repeat MRI brain with contrast in one month  ( ~ 2/14/20)  --f/u stroke neurology clinic in 6 weeks after MRI. If MRI negative for a cause, will need cerebral angiogram to  be set up in the future to investigating non-cavernomatous vascular malformation      Luke Guzman MD  Department of Rehabilitation Medicine  Pager: [phone]    Time Spent on this Encounter   I, Luke Guzman, spent a total of 35 minutes face-to-face or managing the care of Good Petty. Over 50% of my time on the unit was spent counseling the patient and coordinating care. See note for details.

## 2020-01-16 NOTE — PLAN OF CARE
FOCUS/GOAL  Bladder management, Mobility, and Cognition/Memory/Judgment/Problem solving    ASSESSMENT, INTERVENTIONS AND CONTINUING PLAN FOR GOAL:  Pt is alert and oriented x4, VSS, denies pain or SOB. Pt endorses numbness to RLE. Ambulates with SBA and walker. Cont of bladder using toilet. One PVR obtained of 0mL. Using call light appropriately.

## 2020-01-17 ENCOUNTER — APPOINTMENT (OUTPATIENT)
Dept: PHYSICAL THERAPY | Facility: CLINIC | Age: 55
End: 2020-01-17
Payer: COMMERCIAL

## 2020-01-17 ENCOUNTER — APPOINTMENT (OUTPATIENT)
Dept: OCCUPATIONAL THERAPY | Facility: CLINIC | Age: 55
End: 2020-01-17
Payer: COMMERCIAL

## 2020-01-17 PROCEDURE — 12800006 ZZH R&B REHAB

## 2020-01-17 PROCEDURE — 97530 THERAPEUTIC ACTIVITIES: CPT | Mod: GO | Performed by: OCCUPATIONAL THERAPIST

## 2020-01-17 PROCEDURE — 25000132 ZZH RX MED GY IP 250 OP 250 PS 637: Performed by: NURSE PRACTITIONER

## 2020-01-17 PROCEDURE — 97116 GAIT TRAINING THERAPY: CPT | Mod: GP

## 2020-01-17 PROCEDURE — 97110 THERAPEUTIC EXERCISES: CPT | Mod: GP

## 2020-01-17 PROCEDURE — 97112 NEUROMUSCULAR REEDUCATION: CPT | Mod: GP

## 2020-01-17 PROCEDURE — 97535 SELF CARE MNGMENT TRAINING: CPT | Mod: GO

## 2020-01-17 PROCEDURE — 97530 THERAPEUTIC ACTIVITIES: CPT | Mod: GP

## 2020-01-17 RX ADMIN — LATANOPROST 1 DROP: 50 SOLUTION OPHTHALMIC at 21:21

## 2020-01-17 RX ADMIN — AMLODIPINE BESYLATE 2.5 MG: 2.5 TABLET ORAL at 09:20

## 2020-01-17 RX ADMIN — SIMVASTATIN 20 MG: 20 TABLET, FILM COATED ORAL at 21:21

## 2020-01-17 NOTE — PLAN OF CARE
Pt is alert and oriented. Slept well overnight. Denies pain, SOB, chest pain. Assist of 1 with walker and gait belt to transfer. Continent of bowel and bladder. RLE weakness/numbness. Able to make needs known. Cont POC.

## 2020-01-17 NOTE — PROGRESS NOTES
"   01/16/20 1100   Quick Adds   Type of Visit Initial PT Evaluation       Present no   Living Environment   Lives With spouse;child(zeenat), dependent;child(zeenat), adult   Living Arrangements house   Home Accessibility stairs within home   Number of Stairs, Within Home, Primary other (see comments)  (16)   Stair Railings, Within Home, Primary railing on right side (ascending)   Transportation Anticipated family or friend will provide   Living Environment Comment has tub shower, regular toilet; no grab bars; living room has standard couch   Self-Care   Usual Activity Tolerance excellent   Current Activity Tolerance moderate   Regular Exercise Yes   Activity/Exercise Type walking;other (see comments)  (likes to go to the gym daily; eliptical and recumbant bike)   Exercise Amount/Frequency daily   Equipment Currently Used at Home none   Functional Level Prior   Ambulation 0-->independent   Transferring 0-->independent   Toileting 0-->independent   Bathing 0-->independent   Communication 0-->understands/communicates without difficulty   Swallowing 0-->swallows foods/liquids without difficulty   Cognition 0 - no cognition issues reported   Fall history within last six months no   Which of the above functional risks had a recent onset or change? ambulation;transferring;toileting;bathing   Prior Functional Level Comment Pt independent with mobility prior to hospitalization   General Information   Onset of Illness/Injury or Date of Surgery - Date 01/11/20  (Date of ICH)   Referring Physician Dr. Emerson   Patient/Family Goals Statement Would like to get back to home w/ his family   Pertinent History of Current Problem (include personal factors and/or comorbidities that impact the POC) Per MD, \"admitted on 1/11 with right lower weakness and numbness due to ICH in the left paracentral lobule. PMHx of pre-DM, HLD, HTN and glucoma . \"   Precautions/Limitations other (see comments);fall precautions  (SBP<140) "   Heart Disease Risk Factors Diabetes;High blood pressure;Dislipidemia;Gender;Age;Race   Cognitive Status Examination   Orientation orientation to person, place and time   Level of Consciousness alert   Follows Commands and Answers Questions 100% of the time   Personal Safety and Judgment at risk behaviors demonstrated   Memory intact   Pain Assessment   Patient Currently in Pain   (6/10 pain in R LE w/ ambulation)   Integumentary/Edema   Integumentary/Edema no deficits were identifed   Posture    Posture Forward head position;Protracted shoulders   MMT: Shoulder   Shoulder Flexion - Left Side (5/5) normal,left   Shoulder ABduction - Left Side (5/5) normal,left   Shoulder Flexion - Right Side (5/5) normal,right   Shoulder ABduction - Right Side (5/5) normal,right   MMT: Elbow/Forearm, Rehab Eval   Elbow Flexion - Left Side (5/5) normal,left   Elbow Extension - Left Side (5/5) normal,left   Elbow Flexion - Right Side (5/5) normal,right   Elbow Extension - Right Side (5/5) normal,right   MMT: Hand   Hand Muscle Testing Results L : 5/5 ; R  4/5   MMT: Wrist   Wrist Muscle Testing Results L ext/flex 5/5; R ext/flex; 4/5   MMT: Hip, Rehab Eval   Hip Flexion - Left Side (5/5) normal,left   Hip Flexion - Right Side (4/5) good, right   MMT: Knee, Rehab Eval   Knee Extension - Left Side (5/5) normal,left   Knee Extension - Right Side (4/5) good, right   MMT: Ankle, Rehab Eval   Ankle Dorsiflexion - Left Side 5/5) normal,left   Ankle Plantarflexion - Left Side 5/5) normal,left   Ankle Dorsiflexion - Right Side (4/5) good, left   Ankle Plantarflexion - Right Side (4/5) good, left   ARC Assessment Only   Acute Rehab Functional Assessment See IP Rehab Daily Documentation Flowsheet for Functional Mobility/ADL Assessment   Balance   Sitting Balance: Static good balance  (no significant sway in sitting edge of mat w/o UEs)   Sitting Balance: Dynamic good balance  (good pelvic trunk disociation )   Sit-to-Stand Balance good  balance  (smooth transition to stand, minimal use of UEs)   Standing Balance: Static good balance  (no significant sway w/o UE support)   Standing Balance: Dynamic fair balance  (requires UE support to maintain balance while walking)   Systems Impairment Contributing to Balance Disturbance somatosensory;neuromuscular   Identified Impairments Contributing to Balance Disturbance impaired coordination;abnormal muscle tone;impaired postural control;decreased sensation;impaired sensory feedback;decreased strength   Balance Quick Add Sitting balance: Static;Sitting balance: dynamic;Sit to stand balance;Standing balance: static;Standing balance: dynamic;Systems impairment contributing to balance disturbance;Identified impairments contributing to balance disturbance   Sensory Examination   Sensory Perception other (describe)   Sensory Perception Quick Adds Light touch;Proprioception   Sensation Light Touch   LUE w/i normal limits   LLE w/i normal limits   RUE mild impairment  (mild numbness and tingling)   RLE severe impairment  (numbness/tingling throughout R LE, absent Dorsum of R foot)   Trunk mild impairment  (numbness/tingling R trunk)   Proprioception    LLE w/i normal limits   RLE severe impairment  (6/10 toe; 8/10 ankle; 8/8 knee)   Coordination   Coordination other (see comments)  (UE WNL, R LE more challenging w/ strength deficits vs L LE)   Coordination Comments finger to nose, heel to shin, ANDRIA   Muscle Tone   Muscle Tone other (describe)  (R ankle: MAS=1 )   Muscle Tone Comments no clonus detected    Modality Interventions   Planned Modality Interventions TENS;Microcurrent Electrical Stimulation   Planned Modality Interventions Comments TENS/NMES for sensory>motor recovery   General Therapy Interventions   Planned Therapy Interventions balance training;bed mobility training;gait training;groups;manual therapy;neuromuscular re-education;orthotic fitting/training;strengthening;stretching;transfer training;risk  factor education;home program guidelines;progressive activity/exercise   Clinical Impression   Criteria for Skilled Therapeutic Intervention yes, treatment indicated   PT Diagnosis R hemiparesis (LE>UE) and sensory deficits 2/2 L ICH   Influenced by the following impairments strength, blalnce, coordination, proprioception, light touch   Functional limitations due to impairments bed mobility, gait, stairs, transfers   Clinical Presentation Evolving/Changing   Clinical Presentation Rationale undetected HTN, significant R LE sensory deficits, home setup w/ stairs, limited support at home during day, maribel motivation   Clinical Decision Making (Complexity) Moderate complexity   Therapy Frequency Daily  (90 min)   Predicted Duration of Therapy Intervention (days/wks) 5 days   Anticipated Equipment Needs at Discharge standard cane   Anticipated Discharge Disposition Home with Outpatient Therapy   Risk & Benefits of therapy have been explained Yes   Patient, Family & other staff in agreement with plan of care Yes   Clinical Impression Comments See care plan   Total Evaluation Time   Total Evaluation Time (Minutes) 40

## 2020-01-17 NOTE — PLAN OF CARE
FOCUS/GOAL  Bowel management, Bladder management, Nutrition/Feeding/Swallowing precautions, and Medical management    ASSESSMENT, INTERVENTIONS AND CONTINUING PLAN FOR GOAL:  Alert & oriented, uses call light appropriately to make needs known. MOD I with walker for transfers and toileting. Continent of bladder and bowel, reported had BM this AM. Pt reported increased numbness to right leg, MD updated. Denies pain or discomfort.

## 2020-01-17 NOTE — PLAN OF CARE
OT: Required initial SBA-supervision to complete bathing and morning ADLs for cues for energy conservation and cues to engage RLE symmetrically when standing at sink for h/g, mobility, etc to maximize functional use of R side, pt agreeable and demo'd carryover. Pt continues to c/o RLE numbness and pain during activity. Pt is safe to be mod I in room with FWW

## 2020-01-17 NOTE — PLAN OF CARE
Monroe will need 5 days in order to return home mod I with SEC and OP PT.    Mobility Status: SBA in room with FWW. No alarms needed at this time. Hoping to have pt mod I in room with FWW and/or SEC in next 2 days.    Primary Barriers: R side hemisensory deficits, mild RLE weakness    DME Needs: SEC

## 2020-01-17 NOTE — PROGRESS NOTES
[facility]   Acute Rehabilitation Unit  Daily progress note    INTERVAL HISTORY  No acute events overnight.  Today Mr. Petty continues to complain of right sided numbness in his leg, and into the right flank.  Also with tingling and occasional pain, at its worst 6/10, but overall is manageable and does not want to start any medications.  He does not complain of any worsening weakness or function.  He scored 15/30 on the FGA and SBA for ambulation with a FWW.          MEDICATIONS  Scheduled:    amLODIPine  2.5 mg Oral Daily     latanoprost  1 drop Both Eyes Daily     simvastatin  20 mg Oral QPM        PRN:  acetaminophen, hydrALAZINE, senna-docusate       PHYSICAL EXAM  Patient Vitals for the past 24 hrs:   BP Temp Temp src Pulse Resp SpO2   01/17/20 0920 129/82 -- -- -- -- --   01/17/20 0840 130/86 -- -- 79 -- --   01/17/20 0837 139/87 -- -- 106 -- --   01/17/20 0623 126/80 97.6  F (36.4  C) Oral 62 16 99 %   01/16/20 1610 125/75 98.2  F (36.8  C) Oral 83 16 98 %       GEN: NAD, pleasant and cooperative  HEENT: NC/AT  CVS: RRR, S1+S2, no m/r/g  PULM: CTA b/l, no w/r/r  ABD: Soft, NT, ND, bowel sounds present  EXT: No LE edema or calf tenderness b/l  Neuro: Answers appropriately, follows commands    LABS  CBC RESULTS:   Recent Labs   Lab Test 01/13/20  0830   WBC 8.4   RBC 5.61   HGB 14.2   HCT 45.6   MCV 81   MCH 25.3*   MCHC 31.1*   RDW 14.9          Last Comprehensive Metabolic Panel:  Sodium   Date Value Ref Range Status   01/13/2020 142 133 - 144 mmol/L Final     Potassium   Date Value Ref Range Status   01/13/2020 3.8 3.4 - 5.3 mmol/L Final     Chloride   Date Value Ref Range Status   01/13/2020 110 (H) 94 - 109 mmol/L Final     Carbon Dioxide   Date Value Ref Range Status   01/13/2020 30 20 - 32 mmol/L Final     Anion Gap   Date Value Ref Range Status   01/13/2020 2 (L) 3 - 14 mmol/L Final     Glucose   Date Value Ref Range Status   01/13/2020 114 (H) 70 - 99 mg/dL  Final     Urea Nitrogen   Date Value Ref Range Status   01/13/2020 18 7 - 30 mg/dL Final     Creatinine   Date Value Ref Range Status   01/13/2020 1.01 0.66 - 1.25 mg/dL Final     GFR Estimate   Date Value Ref Range Status   01/13/2020 83 >60 mL/min/[1.73_m2] Final     Comment:     Non  GFR Calc  Starting 12/18/2018, serum creatinine based estimated GFR (eGFR) will be   calculated using the Chronic Kidney Disease Epidemiology Collaboration   (CKD-EPI) equation.       Calcium   Date Value Ref Range Status   01/13/2020 8.3 (L) 8.5 - 10.1 mg/dL Final       Recent Labs   Lab 01/14/20  0151 01/13/20  0830 01/12/20  1239 01/12/20  0847 01/12/20  0411 01/12/20  0004 01/11/20  1951  01/11/20  1057   GLC  --  114*  --   --   --   --   --   --  107*   *  --  108* 112* 115* 143* 152*   < >  --     < > = values in this interval not displayed.           ASSESSMENT/PLAN:  Good Petty is a 54 year old left hand dominant male who was admitted on 1/11 with right lower weakness and numbness due to ICH in the left paracentral lobule. PMHx of pre-DM, HLD, HTN and glucoma .  Admitted to acute inpatient rehab 01/15/20  .    Impairment group code: 01.2 R body involvement stroke                                 1. PT, and OT 90 minutes of each on a daily basis, in addition to rehab nursing and close management of physiatrist.       2. Impairment of ADL's:  OT for 90 minutes daily to work on ADL re-training such as grooming, self cares and bathing.       3. Impairment of mobility:  PT for 90 minutes daily to work on neuromuscular re-education focusing on strength, balance, coordination, and endurance.          5.   Rehab RN for med administration, bowel regimen, glucose monitoring and education.     4. Medical Conditions  L frontal lobe ICH: ICH score of 0.  Unclear etiology at this time. Causes include: HTN (less likely), cavernoma, stroke with hemorrhagic tumor, mets, endocarditis, others. He has no significant  HTN history. CTA showed no underlying vascular malformation. TTE unremarkable. CT chest abdomen pelvis with contrast negative for malignancy.  --SBP <140; on amlodipine 2.5 daily and prn hydralazine   --needs repeat MRI brain with contrast in one month  ( ~ 2/14/20)  --f/u stroke clinic in 6 weeks after MRI. If MRI negative for a cause, will need cerebral angiogram to be set up in the future for investigating non-cavernomatous vascular malformation per nwurology        Sinus bradycardia- improving  HR 50s-60s. Asymptomatic  -monitor     Possible hypertension  TTE (1/11) showed mild to moderate LVH which is suggestive of uncontrolled hypertension, but patient's blood pressures have been acceptable thus far  - Monitor blood pressure   -  hydralazine PRN for goal SBP<140  -cont.  amlodipine  2.5mg PO daily     Hyperlipidemia  Lipid panel on 1/12, showed Tchol 203, , HDL 63. ASCVD risk 7.1%  - Started simvastatin 20 mg qhs     Prediabetes  A1c 6.0 on 1/11  - Blood sugars have been acceptable   - Follow up with PCP     Glaucoma  - Continues Latanoprost, PTA eye drops        5. Adjustment to disability:  Clinical psychology to eval and treat as needed  6. FEN: regular with thin liquid  7. Bowel: continent, PRN senokot   8. Bladder: continent.  PVRs x2 WNL, may check PRN only.   9. DVT Prophylaxis: Ambulation  10. GI Prophylaxis: none  11. Code: Full  Disposition: Home with outpatient therapy  12. ELOS:  Tentative discharge date 1/21  13. Rehab prognosis:  good  14. Follow up Appointments on Discharge:   -PCP in 1 week for  Prediabetes  needs repeat MRI brain with contrast in one month  ( ~ 2/14/20)  --f/u stroke neurology clinic in 6 weeks after MRI. If MRI negative for a cause, will need cerebral angiogram to be set up in the future to investigating non-cavernomatous vascular malformation      Luke Guzman MD  Department of Rehabilitation Medicine  Pager: [phone]    Time Spent on this Encounter   I, Luke  Thomas, spent a total of 15 minutes face-to-face or managing the care of Good Petty. Over 50% of my time on the unit was spent counseling the patient and coordinating care. See note for details.

## 2020-01-17 NOTE — PLAN OF CARE
PT: Patient progressing well with dynamic balance tasks. Made mod-I with FWW in room in AM, adjusted to SEC in PM. Ambulating long distances with SEC, traversing inclines well. Anticipating SEC at discharge.

## 2020-01-18 ENCOUNTER — APPOINTMENT (OUTPATIENT)
Dept: PHYSICAL THERAPY | Facility: CLINIC | Age: 55
End: 2020-01-18
Payer: COMMERCIAL

## 2020-01-18 ENCOUNTER — APPOINTMENT (OUTPATIENT)
Dept: OCCUPATIONAL THERAPY | Facility: CLINIC | Age: 55
End: 2020-01-18
Payer: COMMERCIAL

## 2020-01-18 PROCEDURE — 25000132 ZZH RX MED GY IP 250 OP 250 PS 637: Performed by: NURSE PRACTITIONER

## 2020-01-18 PROCEDURE — 97150 GROUP THERAPEUTIC PROCEDURES: CPT | Mod: GO | Performed by: OCCUPATIONAL THERAPIST

## 2020-01-18 PROCEDURE — 12800006 ZZH R&B REHAB

## 2020-01-18 PROCEDURE — 97150 GROUP THERAPEUTIC PROCEDURES: CPT | Mod: GP

## 2020-01-18 PROCEDURE — 97530 THERAPEUTIC ACTIVITIES: CPT | Mod: GP | Performed by: REHABILITATION PRACTITIONER

## 2020-01-18 RX ADMIN — AMLODIPINE BESYLATE 2.5 MG: 2.5 TABLET ORAL at 10:14

## 2020-01-18 RX ADMIN — LATANOPROST 1 DROP: 50 SOLUTION OPHTHALMIC at 20:41

## 2020-01-18 RX ADMIN — SIMVASTATIN 20 MG: 20 TABLET, FILM COATED ORAL at 20:41

## 2020-01-18 NOTE — PLAN OF CARE
FOCUS/GOAL  Medication management and Medical management    ASSESSMENT, INTERVENTIONS AND CONTINUING PLAN FOR GOAL:  A&O, Mod I in room w/ walker. Makes needs known, alarms on.  Pt denied pain during night, slept well.  Continent of B/B, LBM 1/17.

## 2020-01-18 NOTE — PROGRESS NOTES
[facility]   Acute Rehabilitation Unit  Daily progress note    INTERVAL HISTORY  Good was seen and examined at bedside.  He was doing very well.  Slept well last night.  Denied any pain by reported numbness and heaviness in his right lower extremity.  Good appetite and regular bowel and bladder function.        MEDICATIONS  Scheduled:    amLODIPine  2.5 mg Oral Daily     latanoprost  1 drop Both Eyes Daily     simvastatin  20 mg Oral QPM        PRN:  acetaminophen, hydrALAZINE, senna-docusate       PHYSICAL EXAM  Patient Vitals for the past 24 hrs:   BP Temp Temp src Pulse Resp SpO2   01/18/20 0827 131/89 98.1  F (36.7  C) Oral 60 18 99 %   01/17/20 1629 119/74 98  F (36.7  C) Oral 69 17 97 %       GEN: NAD, pleasant and cooperative  CVS: RRR  PULM: Clear breath sounds bilaterally  ABD: Soft, NT, ND  EXT: No LE edema or calf tenderness b/l  Neuro: He was seen ambulating in his room      ASSESSMENT/PLAN:  Good Petty is a 54 year old left hand dominant male who was admitted on 1/11 with right lower weakness and numbness due to ICH in the left paracentral lobule. PMHx of pre-DM, HLD, HTN and glucoma .  Admitted to acute inpatient rehab 01/15/20.    --Vitals stable. No lab today.  --Continue ongoing medical management.  No change in his medications.  --Continue therapies and plan of care.  He is mod I in his room and seems to be on track for discharge on Tuesday.      Lisseth Emerson MD  Physical Medicine & Rehabilitation    Time Spent on this Encounter   I spent a total of 15 minutes face to face and coordinating care of Good Petty.  Over 50% of my time on the unit was spent counseling the patient and /or coordinating care; see note for details.

## 2020-01-18 NOTE — PLAN OF CARE
Pt A&O x4. VSS. Denied pain.denied sob. Denied difficulty breathing. Denied chest pain. Good appetite and oral hydration. MOD I with walker in room. Continent of Bowel and bladder. BMx1 and voiding with out difficulty. Using call light to make needs known. Call light with in reach. Continue with POC.

## 2020-01-18 NOTE — PHARMACY-MEDICATION REGIMEN REVIEW
Pharmacy Medication Regimen Review  Good Petty is a 54 year old male who is currently in the Acute Rehab Unit.    Assessment: Upon review of the medications and patient chart the following irregularities were found:   Significant Drug Interactions: concurrent use of amlodipine and simvastatin may cause increased exposure to simvastatin by up to 80%, if used together the maximum recommended dose of simvastatin is 20 mg  Other Recommendations: please add hold parameters to amlodipine    Plan:   Do not exceed 20 mg of simvastatin while on amlodipine  Add hold parameters to amlodipine  Attending provider will be sent this note for review.  If there are any emergent issues noted above, pharmacist will contact provider directly by phone.      Pharmacy will periodically review the resident's medication regimen for any PRN medications not administered in > 72 hours and discontinue them. The pharmacist will discuss gradual dose reductions of psychopharmacologic medications with interdisciplinary team on a regular basis.    Please contact pharmacy if the above does not answer specific medication questions/concerns.    Background:  A pharmacist has reviewed all medications and pertinent medical history today.  Medications were reviewed for appropriate use and any irregularities found are listed with recommendations.    Johana Trevino, Pharm.D.    Current Facility-Administered Medications:      acetaminophen (TYLENOL) tablet 325-975 mg, 325-975 mg, Oral, Q6H PRN, Gbarbea, Demenia B, CNP     amLODIPine (NORVASC) tablet 2.5 mg, 2.5 mg, Oral, Daily, Gbarbea, Demenia B, CNP, 2.5 mg at 01/18/20 1014     hydrALAZINE (APRESOLINE) tablet 10 mg, 10 mg, Oral, 4x Daily PRN, Lisseth Emerson MD     latanoprost (XALATAN) 0.005 % ophthalmic solution 1 drop, 1 drop, Both Eyes, Daily, Gbarbea, Demenia B, CNP, 1 drop at 01/17/20 2121     senna-docusate (SENOKOT-S/PERICOLACE) 8.6-50 MG per tablet 1-4 tablet, 1-4 tablet, Oral, BID PRN, Gbarbea,  Keira ADAMSON CNP     simvastatin (ZOCOR) tablet 20 mg, 20 mg, Oral, QPM, GbarbKeira flores B, CNP, 20 mg at 01/17/20 2121  No current outpatient prescriptions on file.

## 2020-01-18 NOTE — PLAN OF CARE
FOCUS/GOAL  Bowel management, Bladder management, and Medical management    ASSESSMENT, INTERVENTIONS AND CONTINUING PLAN FOR GOAL:  Alert & oriented, uses call light to make needs known. MOD I with cane for transfers, toileting and transferring. Continent of bladder and bowel, reported having BM this AM. Denies pain or discomfort.

## 2020-01-18 NOTE — PLAN OF CARE
Pt attended seated yoga exercise class today with group of 6 patients.  Pt selected for class due to documented strength and mobility deficits.  Class includes education in movement and breath awareness, and time spent doing seated yoga exercises lead by staff. Pt was able to participate and demonstrated improved neutral posture, decreased muscle guarding, and improved breathing mechanics.      Jorge Adams, PT  1/18/2020

## 2020-01-18 NOTE — PLAN OF CARE
"OT: Pt participated in the established \"Transitions Group\" for stroke pts. Highlighting topics such as return to meaningful activities, rehab course, neuroplasticity, health/wellness, fatigue, depression/anxiety, equipment, bowel/bladder considerations w/ community re-integration and caregiver wellness/support. Pt very receptive to class. Pt reports personal goals after discharge are to improve RLE function back to normal.  "

## 2020-01-18 NOTE — PLAN OF CARE
PT: pt willing to work with PT today, pt mod I to IND for all bed mob, sit to stand- transfer with SEC for support. Pt demo amb up to 200'x 2 with SEC needing SBA. Pt demo high level gait and balance drills on uneven surfaces, use of Bosu ball and reach tasks about head and near floor with use of stepping toward target. Pt needing clsoe SBA for all, no LOB limited by weakness and fatigue.

## 2020-01-19 ENCOUNTER — APPOINTMENT (OUTPATIENT)
Dept: PHYSICAL THERAPY | Facility: CLINIC | Age: 55
End: 2020-01-19
Payer: COMMERCIAL

## 2020-01-19 PROCEDURE — 97150 GROUP THERAPEUTIC PROCEDURES: CPT | Mod: GP

## 2020-01-19 PROCEDURE — 97112 NEUROMUSCULAR REEDUCATION: CPT | Mod: GP | Performed by: REHABILITATION PRACTITIONER

## 2020-01-19 PROCEDURE — 25000132 ZZH RX MED GY IP 250 OP 250 PS 637: Performed by: NURSE PRACTITIONER

## 2020-01-19 PROCEDURE — 12800006 ZZH R&B REHAB

## 2020-01-19 PROCEDURE — 25000132 ZZH RX MED GY IP 250 OP 250 PS 637: Performed by: PHYSICAL MEDICINE & REHABILITATION

## 2020-01-19 RX ADMIN — AMLODIPINE BESYLATE 2.5 MG: 2.5 TABLET ORAL at 09:30

## 2020-01-19 RX ADMIN — SIMVASTATIN 20 MG: 20 TABLET, FILM COATED ORAL at 20:58

## 2020-01-19 RX ADMIN — LATANOPROST 1 DROP: 50 SOLUTION OPHTHALMIC at 20:58

## 2020-01-19 NOTE — PLAN OF CARE
FOCUS/GOAL  Medication management and Medical management     ASSESSMENT, INTERVENTIONS AND CONTINUING PLAN FOR GOAL:  A&O, Mod I in room w/ walker. Makes needs known, alarms on.  Pt denied pain during night, slept well.  Continent of B/B, LBM 1/18.

## 2020-01-19 NOTE — PLAN OF CARE
FOCUS/GOAL  Bowel management, Bladder management, Nutrition/Feeding/Swallowing precautions, and Medical management    ASSESSMENT, INTERVENTIONS AND CONTINUING PLAN FOR GOAL:  Alert & oriented, uses call light to make needs known. MOD I in room with cane. Continent of bladder and bowel, reported BM this AM. Pt c/o left lower back pain, warm pack applied to site with some relief, declined pharmacological intervention. Appetite 100% for meals. Pleasant & cooperative.

## 2020-01-19 NOTE — PROGRESS NOTES
PM&R brief note    Chart reviewed.  No issues per nursing.  I did not personally see this patient today.  Continue current plan of care. I day tomorrow and discharge to home on Tuesday.     Lisseth Emerson MD  Physical Medicine & Rehabilitation

## 2020-01-19 NOTE — PLAN OF CARE
FOCUS/GOAL  Pain management, Mobility and Skin integrity    ASSESSMENT, INTERVENTIONS AND CONTINUING PLAN FOR GOAL:    Pt A&Ox4. VSS. Denied pain. Denied sob. Denied difficulty breathing. Denied chest pain. No neuro changes. Good appetite and oral hydration. MOD I in room. BM x1 this shift.   Urinating  without difficulties. Skin intact.  Utilizing call light to make needs known. Call light with in reach. Continue with POC.

## 2020-01-19 NOTE — PLAN OF CARE
PT: pt demo high level gait and balance drills with static and dynamic mobility. Pt demo up to 400'x 2 once with SEC and once without A.D. pt demo good balance and stability for all. Pt to cont to use SEC for all mobility.

## 2020-01-19 NOTE — PLAN OF CARE
Pt attended Falls Prevention class today with group of 6 patients. Pt selected for class due to documented gait deficit and falls risk. Class includes education in falls risks, how to decrease that risk through behavior and home modifications and energy conservation; and instruction in available equipment designed to increase home safety. Pt was able to verbalize understanding of materials and participated appropriately in the discussion and problem-solving segments of the class.

## 2020-01-20 ENCOUNTER — APPOINTMENT (OUTPATIENT)
Dept: PHYSICAL THERAPY | Facility: CLINIC | Age: 55
End: 2020-01-20
Payer: COMMERCIAL

## 2020-01-20 ENCOUNTER — APPOINTMENT (OUTPATIENT)
Dept: OCCUPATIONAL THERAPY | Facility: CLINIC | Age: 55
End: 2020-01-20
Payer: COMMERCIAL

## 2020-01-20 PROCEDURE — 97530 THERAPEUTIC ACTIVITIES: CPT | Mod: GP

## 2020-01-20 PROCEDURE — 97110 THERAPEUTIC EXERCISES: CPT | Mod: GP

## 2020-01-20 PROCEDURE — 97116 GAIT TRAINING THERAPY: CPT | Mod: GP

## 2020-01-20 PROCEDURE — 97112 NEUROMUSCULAR REEDUCATION: CPT | Mod: GP

## 2020-01-20 PROCEDURE — 25000132 ZZH RX MED GY IP 250 OP 250 PS 637: Performed by: PHYSICAL MEDICINE & REHABILITATION

## 2020-01-20 PROCEDURE — 25000132 ZZH RX MED GY IP 250 OP 250 PS 637: Performed by: NURSE PRACTITIONER

## 2020-01-20 PROCEDURE — 97535 SELF CARE MNGMENT TRAINING: CPT | Mod: GO

## 2020-01-20 PROCEDURE — 12800006 ZZH R&B REHAB

## 2020-01-20 RX ORDER — LATANOPROST 50 UG/ML
1 SOLUTION/ DROPS OPHTHALMIC DAILY
Qty: 1 BOTTLE | Refills: 0 | Status: SHIPPED | OUTPATIENT
Start: 2020-01-20

## 2020-01-20 RX ORDER — SIMVASTATIN 20 MG
20 TABLET ORAL EVERY EVENING
Qty: 30 TABLET | Refills: 0 | Status: SHIPPED | OUTPATIENT
Start: 2020-01-20 | End: 2020-02-12

## 2020-01-20 RX ORDER — ACETAMINOPHEN 325 MG/1
325-975 TABLET ORAL EVERY 6 HOURS PRN
COMMUNITY
Start: 2020-01-20 | End: 2020-01-21

## 2020-01-20 RX ORDER — AMLODIPINE BESYLATE 2.5 MG/1
2.5 TABLET ORAL DAILY
Qty: 30 TABLET | Refills: 0 | Status: SHIPPED | OUTPATIENT
Start: 2020-01-20 | End: 2020-02-12

## 2020-01-20 RX ADMIN — LATANOPROST 1 DROP: 50 SOLUTION OPHTHALMIC at 21:07

## 2020-01-20 RX ADMIN — SIMVASTATIN 20 MG: 20 TABLET, FILM COATED ORAL at 21:06

## 2020-01-20 RX ADMIN — AMLODIPINE BESYLATE 2.5 MG: 2.5 TABLET ORAL at 08:24

## 2020-01-20 NOTE — PROGRESS NOTES
[facility]   Acute Rehabilitation Unit  Daily progress note    INTERVAL HISTORY  Weekend notes reviewed.  No significant events.  Today has no new concerns or complaints, and R sided numbness is improving.  He developed some lower back pain, but is tolerable.  Denies chest pain or shortness of breath.  Functionally he is Mod I in the room with a cane, and he is ready for discharge home tomorrow.         MEDICATIONS  Scheduled:    amLODIPine  2.5 mg Oral Daily     latanoprost  1 drop Both Eyes Daily     simvastatin  20 mg Oral QPM        PRN:  acetaminophen, hydrALAZINE, senna-docusate       PHYSICAL EXAM  Patient Vitals for the past 24 hrs:   BP Temp Temp src Pulse Resp SpO2   01/20/20 0823 136/84 97  F (36.1  C) Oral 60 16 99 %       GEN: NAD, pleasant and cooperative  HEENT: NC/AT  CVS: RRR, S1+S2, no m/r/g  PULM: CTA b/l, no w/r/r  ABD: Soft, NT, ND, bowel sounds present  EXT: No LE edema or calf tenderness b/l  Neuro: Answers appropriately, follows commands    LABS  CBC RESULTS:   Recent Labs   Lab Test 01/13/20  0830   WBC 8.4   RBC 5.61   HGB 14.2   HCT 45.6   MCV 81   MCH 25.3*   MCHC 31.1*   RDW 14.9            Last Comprehensive Metabolic Panel:  Sodium   Date Value Ref Range Status   01/13/2020 142 133 - 144 mmol/L Final     Potassium   Date Value Ref Range Status   01/13/2020 3.8 3.4 - 5.3 mmol/L Final     Chloride   Date Value Ref Range Status   01/13/2020 110 (H) 94 - 109 mmol/L Final     Carbon Dioxide   Date Value Ref Range Status   01/13/2020 30 20 - 32 mmol/L Final     Anion Gap   Date Value Ref Range Status   01/13/2020 2 (L) 3 - 14 mmol/L Final     Glucose   Date Value Ref Range Status   01/13/2020 114 (H) 70 - 99 mg/dL Final     Urea Nitrogen   Date Value Ref Range Status   01/13/2020 18 7 - 30 mg/dL Final     Creatinine   Date Value Ref Range Status   01/13/2020 1.01 0.66 - 1.25 mg/dL Final     GFR Estimate   Date Value Ref Range Status   01/13/2020  83 >60 mL/min/[1.73_m2] Final     Comment:     Non  GFR Calc  Starting 12/18/2018, serum creatinine based estimated GFR (eGFR) will be   calculated using the Chronic Kidney Disease Epidemiology Collaboration   (CKD-EPI) equation.       Calcium   Date Value Ref Range Status   01/13/2020 8.3 (L) 8.5 - 10.1 mg/dL Final       Recent Labs   Lab 01/14/20  0151   *           ASSESSMENT/PLAN:  Good Petty is a 54 year old left hand dominant male who was admitted on 1/11 with right lower weakness and numbness due to ICH in the left paracentral lobule. PMHx of pre-DM, HLD, HTN and glucoma .  Admitted to acute inpatient rehab 01/15/20  .    Impairment group code: 01.2 R body involvement stroke                                 1. PT, and OT 90 minutes of each on a daily basis, in addition to rehab nursing and close management of physiatrist.       2. Impairment of ADL's:  OT for 90 minutes daily to work on ADL re-training such as grooming, self cares and bathing.       3. Impairment of mobility:  PT for 90 minutes daily to work on neuromuscular re-education focusing on strength, balance, coordination, and endurance.          5.   Rehab RN for med administration, bowel regimen, glucose monitoring and education.     4. Medical Conditions  L frontal lobe ICH: ICH score of 0.  Unclear etiology at this time. Causes include: HTN (less likely), cavernoma, stroke with hemorrhagic tumor, mets, endocarditis, others. He has no significant HTN history. CTA showed no underlying vascular malformation. TTE unremarkable. CT chest abdomen pelvis with contrast negative for malignancy.  --SBP <140; on amlodipine 2.5 daily and prn hydralazine   --needs repeat MRI brain with contrast in one month  ( ~ 2/14/20)  --f/u stroke clinic in 6 weeks after MRI. If MRI negative for a cause, will need cerebral angiogram to be set up in the future for investigating non-cavernomatous vascular malformation per nwurology        Sinus  bradycardia- improving  HR 50s-60s. Asymptomatic  -monitor     Possible hypertension  TTE (1/11) showed mild to moderate LVH which is suggestive of uncontrolled hypertension, but patient's blood pressures have been acceptable thus far  - Monitor blood pressure   -  hydralazine PRN for goal SBP<140  -cont.  amlodipine  2.5mg PO daily     Hyperlipidemia  Lipid panel on 1/12, showed Tchol 203, , HDL 63. ASCVD risk 7.1%  - Started simvastatin 20 mg qhs     Prediabetes  A1c 6.0 on 1/11  - Blood sugars have been acceptable   - Follow up with PCP     Glaucoma  - Continues Latanoprost, PTA eye drops        5. Adjustment to disability:  Clinical psychology to eval and treat as needed  6. FEN: regular with thin liquid  7. Bowel: continent, PRN senokot   8. Bladder: continent.  PVRs x2 WNL, may check PRN only.   9. DVT Prophylaxis: Ambulation  10. GI Prophylaxis: none  11. Code: Full  Disposition: Home with outpatient therapy  12. ELOS:  Tentative discharge date 1/21  13. Rehab prognosis:  good  14. Follow up Appointments on Discharge:   -PCP in 1 week for  Prediabetes  needs repeat MRI brain with contrast in one month  ( ~ 2/14/20)  --f/u stroke neurology clinic in 6 weeks after MRI. If MRI negative for a cause, will need cerebral angiogram to be set up in the future to investigating non-cavernomatous vascular malformation      Luke Guzman MD  Department of Rehabilitation Medicine  Pager: [phone]    Time Spent on this Encounter   I, Luke Guzman, spent a total of 25 minutes face-to-face or managing the care of Good Petty. Over 50% of my time on the unit was spent counseling the patient and coordinating care. See note for details.

## 2020-01-20 NOTE — PROGRESS NOTES
Patient is A&O x4. Denied CP, lightheadedness, dizziness and SOB. Baseline numbness on right lower extremity per pt report. Mod I in the room with cane. Drinking well and voiding spontaneously without difficulties, LBM, 1/19. Able to wiggle toes, denied pain in the morning and report of back pain at the middle of the shift which pt refused intervention/pain med's.  Pt is self repositioned and turned in bed, able to use call light appropriately and make needs known, will continue to monitor patient as POC.

## 2020-01-20 NOTE — PLAN OF CARE
Occupational Therapy Discharge Summary    Reason for therapy discharge:    All goals and outcomes met, no further needs identified.    Progress towards therapy goal(s). See goals on Care Plan in Epic electronic health record for goal details.  Goals met, has progressed to mod I with SEC    Therapy recommendation(s):    Pt will return home mod I using SEC for all ADLs and mobility. Spouse will assist with meal prep and community mobility. Pt has no further OT needs at this time as pt is able to safely use SEC during home management, self cares, and mobility and RUE strength/coordination is at baseline. Pt is limited by slight RLE weakness and numbness, therefore pt will have OP PT at discharge for continued RLE neuro re-ed. Recommend shower chair for energy conservation during bathing and SEC for mobility until he is able to return to prior level of complete independence

## 2020-01-20 NOTE — PLAN OF CARE
Physical Therapy Discharge Summary    Reason for therapy discharge:    Discharged to home with outpatient therapy.    Progress towards therapy goal(s). See goals on Care Plan in Psychiatric electronic health record for goal details.  Goals met  Pt met all functional goals to discharge to home but did not retest FGA before discharge     Therapy recommendation(s):    Continued therapy is recommended.  Rationale/Recommendations:  To continue to progress dynamic balance and LE strength to help with functional mobility and help wean off of assistive device.    Summary - pt is currently mod I w/ SPC for ambulating in room and around unit. Pt able to ambulate stairs mod I using SPC and railing.     DME - dispensed SPC from [facility]    Outcome measure: FGA 15/30 (1/16/20)

## 2020-01-20 NOTE — PLAN OF CARE
FOCUS/GOAL  Bowel management, Bladder management, Pain management and Mobility    ASSESSMENT, INTERVENTIONS AND CONTINUING PLAN FOR GOAL:    Pt A&Ox4. VSS. C/o pain to right lower back. decreased with cold therapy and rest. Pt declined pharmacological pain intervention.  Denied sob, denied difficulty breathing. Denied chest pain. Good appetite and oral hydration. Continent BB. Mod I in room with cane. Independent with oral and donny-care. Using call light to make needs known. Call alight within reach. Continue to monitor.

## 2020-01-20 NOTE — PROGRESS NOTES
Plan for discharge home tomorrow with OP therapy. SWer met with pt at bedside. Pt reported that he needs transportation resources. Printed off information for transport in [facility], taxi [employer], senior linkage line, [employer] and [place] stroke association. Pt expressed appreciation. Pt reported that his wife is overwhelemed with thought of being caregiver and providing for pt's family. Support was provided. CEZAR provided pt with support group information as well. Pt plans to schedule his own PCP at at [facility] and denied assistance from [facility]. SWer encouraged pt to contact the SWer in his PCP clinic if needed.     Discussed transport home. Pt stated that he would ask his wife to come in the morning but asked about options if she is unavailable. SWer will assist in setting up taxi to get home if needed. SW plans to f/u with pt in the morning to follow up. Pt requesting SW card, will provide tomorrow. Pt denied additional needs at this time.     Marjorie Calderón, PRETTY, [facility] Acute Rehab Unit   Phone: [phone]  I   Pager: [phone]

## 2020-01-20 NOTE — PLAN OF CARE
FOCUS/GOAL  Medication management and Medical management     ASSESSMENT, INTERVENTIONS AND CONTINUING PLAN FOR GOAL:  A&O, Mod I in room w/ walker. Makes needs known, alarms on.  Pt denied pain during night, slept well.  Continent of B/B, LBM 1/19.

## 2020-01-21 VITALS
OXYGEN SATURATION: 98 % | HEART RATE: 65 BPM | HEIGHT: 71 IN | WEIGHT: 187 LBS | DIASTOLIC BLOOD PRESSURE: 81 MMHG | SYSTOLIC BLOOD PRESSURE: 128 MMHG | RESPIRATION RATE: 18 BRPM | BODY MASS INDEX: 26.18 KG/M2 | TEMPERATURE: 98.6 F

## 2020-01-21 PROCEDURE — 25000132 ZZH RX MED GY IP 250 OP 250 PS 637: Performed by: NURSE PRACTITIONER

## 2020-01-21 PROCEDURE — 25000132 ZZH RX MED GY IP 250 OP 250 PS 637: Performed by: PHYSICAL MEDICINE & REHABILITATION

## 2020-01-21 RX ORDER — ACETAMINOPHEN 325 MG/1
325-650 TABLET ORAL EVERY 4 HOURS PRN
COMMUNITY
Start: 2020-01-21 | End: 2020-02-12

## 2020-01-21 RX ADMIN — ACETAMINOPHEN 975 MG: 325 TABLET, FILM COATED ORAL at 07:40

## 2020-01-21 RX ADMIN — SENNOSIDES AND DOCUSATE SODIUM 1 TABLET: 8.6; 5 TABLET ORAL at 07:41

## 2020-01-21 RX ADMIN — AMLODIPINE BESYLATE 2.5 MG: 2.5 TABLET ORAL at 07:41

## 2020-01-21 NOTE — DISCHARGE SUMMARY
[facility]   Acute Rehabilitation Unit  Discharge summary     Date of Admission: 1/15/2020  Date of Discharge: 1/21/2020  Disposition: Home  Primary Care Physician: [facility]  Attending physician: Jim Guzman MD  Other significant physician provider(s): None      discharge diagnosis      L ICH    HTN, HLD, pre-diabetes    brief summary (per hpi)  Good Petty is a 54 year old left hand dominant male who had been told he has prediabetes and an elevated cholesterol.  No history of hypertension, stroke, heart disease, tobacco or  Alcohol /illicit drugs use..  Pt stated, never been admitted to a hospital, no prescription medications.   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     He  had been feeling totally well lately and felt well when he went to bed the night of 1/11/20.  He said that  at exactly 7:25 AM, he developed pins-and-needles as well as less heaviness in his right leg, from his buttock down to the tips of his toes.  He had some difficulty moving the right leg.  He had no headache, dizziness, change in vision, difficulty speaking or understanding.  He had no difficulty swallowing or moving his upper extremities.  He had no difficulty or numbness in the left lower extremity and no back pain.  He went to [facility] where his vital signs were stable and he was normotensive.  He was sent to the [facility] emergency department to be evaluated.     In the emergency CT of the head without contrast showed an acute to subacute intraparenchymal hematoma in the left paracentral lobule with mild surrounding vasogenic edema.  MRI of the brain without and with contrast showed a mass within the left paracentral lobule measuring up to 3.2 cm consistent with an intraparenchymal hematoma which was acute to subacute.  There was mild surrounding vasogenic edema.  Labs were unremarkable.   The patient was seen by the stroke neurologist, got admitted to the intensive care unit for blood pressure control and further investigation.     During his acute hospitalization, patient was seen and evaluated by  PT, and OT.  All specialties collectively recommended that patient would benefit from ongoing therapies in the acute inpatient rehabilitation setting.     REHABILITATION COURSE  Good Petty was admitted to the [place] acute inpatient rehabilitation unit on 1/15/20 where he participated in PT and OT for 3 hrs/day.  He had no medical complications and BPs were well controlled with Norvasc 2.5 mg daily only.  He made excellent functional progress during his time on rehab and at the time of discharge he was ambulating at a Mod I level with a cane, and Mod I for ADLs.  He will discharge home with outpatient PT and OT.     MEDICAL COURSE  L frontal lobe ICH: ICH score of 0.  Unclear etiology at this time. Causes include: HTN (less likely), cavernoma, stroke with hemorrhagic tumor, mets, endocarditis, others. He has no significant HTN history. CTA showed no underlying vascular malformation. TTE unremarkable. CT chest abdomen pelvis with contrast negative for malignancy.  --SBP <140; on amlodipine 2.5 daily and prn hydralazine   --needs repeat MRI brain with contrast in one month  ( ~ 2/14/20)  --f/u stroke clinic in 6 weeks after MRI. If MRI negative for a cause, will need cerebral angiogram to be set up in the future for investigating non-cavernomatous vascular malformation per nwurology        Sinus bradycardia- improving  HR 50s-60s. Asymptomatic  -monitor     Possible hypertension  TTE (1/11) showed mild to moderate LVH which is suggestive of uncontrolled hypertension, but patient's blood pressures have been acceptable thus far  -cont.  amlodipine  2.5mg PO daily     Hyperlipidemia  Lipid panel on 1/12, showed Tchol 203, , HDL 63. ASCVD risk 7.1%  - Started simvastatin 20 mg  qhs     Prediabetes  A1c 6.0 on 1/11  - Follow up with PCP     Glaucoma  - Continues Latanoprost, PTA eye drops        Follow up Appointments on Discharge:   -PCP in 1 week  --repeat MRI brain with contrast in one month  ( ~ 2/14/20)  --f/u stroke neurology clinic in 6 weeks after MRI. If MRI negative for a cause, will need cerebral angiogram to be set up in the future to investigating non-cavernomatous vascular malformation      dISCHARGE MEDICATIONS  Current Discharge Medication List      START taking these medications    Details   acetaminophen (TYLENOL) 325 MG tablet Take 1-2 tablets (325-650 mg) by mouth every 4 hours as needed for mild pain or fever (> 101 F)    Associated Diagnoses: Other left-sided nontraumatic intracerebral hemorrhage (H)         CONTINUE these medications which have CHANGED    Details   amLODIPine (NORVASC) 2.5 MG tablet Take 1 tablet (2.5 mg) by mouth daily  Qty: 30 tablet, Refills: 0    Associated Diagnoses: Other nontraumatic intracerebral hemorrhage, unspecified laterality (H)      latanoprost (XALATAN) 0.005 % ophthalmic solution Place 1 drop into both eyes daily  Qty: 1 Bottle, Refills: 0    Associated Diagnoses: Other left-sided nontraumatic intracerebral hemorrhage (H)      simvastatin (ZOCOR) 20 MG tablet Take 1 tablet (20 mg) by mouth every evening  Qty: 30 tablet, Refills: 0    Associated Diagnoses: Other nontraumatic intracerebral hemorrhage, unspecified laterality (H)               DISCHARGE INSTRUCTIONS AND FOLLOW UP  Discharge Procedure Orders   MR Brain w/o & w Contrast   Standing Status: Future Standing Exp. Date: 01/20/21     Order Specific Question Answer Comments   Priority Routine    Does the patient have a cochlear implant, pacemaker or ICD wires? If so, please contact the MRI department where the scan will be performed to verify compatibility before proceeding. NO - order as requested    Does the patient have claustrophobia? No      Physical Therapy Referral    Standing Status: Future   Referral Priority: Routine Referral Type: Rehab Therapy Physical Therapy   Number of Visits Requested: 1     Occupational Therapy Referral   Standing Status: Future   Referral Priority: Routine Referral Type: Occupational Therapy   Number of Visits Requested: 1     Reason for your hospital stay   Order Comments: Rehabilitation after a stroke     Adult [facility] Follow-up and recommended labs and tests   Order Comments: Follow up with primary care provider, [facility], within 7 days for hospital follow- up and regarding new diagnosis.  The following labs/tests are recommended: Check blood pressure, follow up pre-diabetes.      Appointments on [place] and/or [facility] (with [facility] or [facility] provider or service). Call [phone] if you haven't heard regarding these appointments within 7 days of discharge.     Activity   Order Comments: Your activity upon discharge: activity as tolerated with a cane and no driving for now     Order Specific Question Answer Comments   Is discharge order? Yes      When to contact your care team   Order Comments: Call your primary doctor if you have any of the following: Chest pain, shortness of breath, sudden worsening of weakness, worsening of ambulation, garbled speech or difficulty speaking, sudden onset of severe headache, or any other symptom you may find concerning.     Discharge Instructions   Order Comments: Avoid taking NSAID medications.  Among these medications includes Advil or Motrin (Ibuprofen) and Aleve (Naprosyn).     If you need a medication for pain, I recommend taking Tylenol 650 mg every 4-6 hours as needed.  Do not exceed 4000 mg of Tylenol in a 24 hour period.     Diet   Order Comments: Follow this diet upon discharge: Regular Diet Adult; Thin Liquids (water, ice chips, juice, milk, gelatin, ice cream, etc)     Order Specific Question Answer Comments   Is discharge order? Yes           physical examination    Most  recent Vital Signs:   Vitals:    01/19/20 0725 01/19/20 1512 01/20/20 0823 01/21/20 0736   BP: 134/85 130/74 136/84 128/81   BP Location: Left arm Left arm Left arm Left arm   Pulse: 61 85 60 65   Resp: 18 18 16 18   Temp: 98.1  F (36.7  C) 98.2  F (36.8  C) 97  F (36.1  C) 98.6  F (37  C)   TempSrc: Oral Oral Oral Oral   SpO2: 98% 98% 99% 98%   Weight:       Height:           GEN: NAD, pleasant and cooperative  HEENT: NC/AT  CVS: RRR, S1+S2, no m/r/g  PULM: CTA b/l, no w/r/r  ABD: Soft, NT, ND, bowel sounds present  EXT: No LE edema or calf tenderness b/l  Neuro: Answers appropriately, follows commands.  Ambulating with a cane within the room.       35 minutes spent in discharge, including >50% in counseling and coordination of care, medication review and plan of care recommended on follow up.     Discharge summary was forwarded to [facility] (PCP) at the time of discharge, so as to bridge from hospital to outpatient care.     It was our pleasure to care for Good Petty during this hospitalization. Please do not hesitate to contact me should there be questions regarding the hospital course or discharge plan.        Luke Guzman MD  Department of Rehabilitation Medicine  Pager: [phone]

## 2020-01-21 NOTE — PROGRESS NOTES
FOCUS/GOAL  Discharge planning and Mobility    ASSESSMENT, INTERVENTIONS AND CONTINUING PLAN FOR GOAL:  MOD I with cane in the room and in the hallway , ambulated in the room with cane , continent of urine in the toilet , with baseline numbness of the rt LE . Discharging to home tomorrow with outpatient therapy .

## 2020-01-21 NOTE — PLAN OF CARE
AOX4, able to make needs known. Mod I in room. VSS. CO lower back pain managed with meds with relief. Discharge instructions discussed with patient and spouse and they verbalize understanding. Patient safely transferred into vehicle for ride home.

## 2020-01-21 NOTE — DISCHARGE INSTRUCTIONS
Follow Up Appointments    -- Neurology 6 weeks  You are scheduled to see Anupama Lord PA-C with Dr. Fisher on Monday, February 17th at 8:45 AM. Please arrive at 8:30 AM.    Address  [facility]                          [address], [address]                          [place] [address]  Phone   [phone]    -- MRI w/ and w/out contrast 4 weeks (prior to Neurology appointment)  [facility] will contact you to schedule this MRI.    -- Follow up and establish care with PCP  **Family will schedule**  Please call [phone] to schedule your appointment.    Address  [facility]                          [address] [place] [address]  Phone   [phone]    Transportation Resources:   [facility]--Ph: [phone]  [place] Stroke Association-- Ph: [phone]  [facility]--Ph: [phone] (Many resources in the [facility] area)  [employer]     -------------------------------------------    To reduce the risk of subsequent stroke there are several important factors including optimal management of anticoagulants, blood pressure, cholesterol, diabetes and smoking abstinence.    Anticoagulation:  You are not on anticoagulation medications because you had the bleeding type of stroke    Blood Pressure:  Keeping your blood pressures less than 130/80 has been shown to reduce risk of recurrent stroke. Recording your blood pressure and heart rate once daily in a log book can help you and your providers make decisions on optimal management. You are encouraged to bring your log book with you to your primary physician and/or cardiology doctor visits.    You are currently on Amlodipine to help control your blood pressure. Several lifestyle modifications have been associated with blood pressure reduction and are an important part of a comprehensive plan. These include: weight loss (if over-weight); a diet low in salt and  "cholesterol and rich in fruits and vegetables; regular aerobic physical activity and limited alcohol consumption.    Diabetes:  You do not have diabetes though it is important to continue monitoring for this in the future with your primary provider.    Diet:  Currently  you're on a regular diet.  Diet can significantly affect your levels and should be part of your plan. Please see additional information from dietitian about this.    Cholesterol:  Traditional target levels for LDL cholesterol or \"bad cholesterol\" is less than 130 however once you have had a stroke, your target LDL level is now less than 70. Additional recommendations such as increasing your HDL or \"good\" cholesterol and lowering your triglyceride level can also be important.    Your most recent lipid panel was   Lab Results   Component Value Date    CHOL 203 01/12/2020     Lab Results   Component Value Date    HDL 63 01/12/2020     Lab Results   Component Value Date     01/12/2020     Lab Results   Component Value Date    TRIG 45 01/12/2020     No results found for: CHOLHDLRATIO    This should be followed up in 2-3 months with your primary provider.    Smoking:  Finally one of the most important modifiable risk factors is to not smoke. This includes cigarettes, pipes, cigars, chewing tobacco and second hand smoke. Support through counseling, nicotine replacement, and oral smoking-cessation medications may all be helpful. Often people have been able to quit during their hospitalization but once returning to their familiar environment, the urges can be stronger. If this is the case, we encourage you to get support. There are numerous options, start by talking with your doctor.    "

## 2020-01-21 NOTE — PROGRESS NOTES
Plan to discharge home with OP therapy. Jamesr met with pt at bedside and provided SW business card, as requested. Pt stated that his wife will drive him home and no need for SW assistance to arrange ride. Pt aware that if concerns arise he can contact PCP clinic and speak with RN CHIRAG or Sarah. No further SW needs.     PRETTY Eisenberg, [facility] Acute Rehab Unit   Phone: [phone]  I   Pager: [phone]

## 2020-01-21 NOTE — PLAN OF CARE
Pt is alert and oriented. Denies pain, SOB, chest pain. Mod I in room with cane. Continent of B/B using toilet. Continues numbness to RLE. Able to make needs known. Plan for discharge home today 1/21.

## 2020-01-23 ENCOUNTER — HOSPITAL ENCOUNTER (OUTPATIENT)
Dept: PHYSICAL THERAPY | Facility: CLINIC | Age: 55
End: 2020-01-23
Attending: PHYSICAL MEDICINE & REHABILITATION
Payer: COMMERCIAL

## 2020-01-23 DIAGNOSIS — I61.8 OTHER LEFT-SIDED NONTRAUMATIC INTRACEREBRAL HEMORRHAGE (H): ICD-10-CM

## 2020-01-23 DIAGNOSIS — Z74.09 MOBILITY IMPAIRED: ICD-10-CM

## 2020-01-23 DIAGNOSIS — R26.9 ABNORMAL GAIT: ICD-10-CM

## 2020-01-23 DIAGNOSIS — M62.81 GENERALIZED MUSCLE WEAKNESS: Primary | ICD-10-CM

## 2020-01-23 DIAGNOSIS — R26.89 BALANCE PROBLEMS: ICD-10-CM

## 2020-01-23 PROCEDURE — 97162 PT EVAL MOD COMPLEX 30 MIN: CPT | Mod: GP | Performed by: PHYSICAL THERAPIST

## 2020-01-23 PROCEDURE — 97110 THERAPEUTIC EXERCISES: CPT | Mod: GP | Performed by: PHYSICAL THERAPIST

## 2020-01-27 ENCOUNTER — HOSPITAL ENCOUNTER (OUTPATIENT)
Dept: PHYSICAL THERAPY | Facility: CLINIC | Age: 55
End: 2020-01-27
Payer: COMMERCIAL

## 2020-01-27 DIAGNOSIS — Z74.09 IMPAIRED FUNCTIONAL MOBILITY, BALANCE, GAIT, AND ENDURANCE: Primary | ICD-10-CM

## 2020-01-27 DIAGNOSIS — G81.91 RIGHT HEMIPARESIS (H): ICD-10-CM

## 2020-01-27 DIAGNOSIS — I61.8 OTHER LEFT-SIDED NONTRAUMATIC INTRACEREBRAL HEMORRHAGE (H): ICD-10-CM

## 2020-01-27 PROCEDURE — 97110 THERAPEUTIC EXERCISES: CPT | Mod: GP | Performed by: PHYSICAL THERAPIST

## 2020-01-27 PROCEDURE — 97112 NEUROMUSCULAR REEDUCATION: CPT | Mod: GP | Performed by: PHYSICAL THERAPIST
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
## 2020-02-06 ENCOUNTER — HOSPITAL ENCOUNTER (OUTPATIENT)
Dept: PHYSICAL THERAPY | Facility: CLINIC | Age: 55
End: 2020-02-06
Payer: COMMERCIAL

## 2020-02-06 DIAGNOSIS — M62.81 GENERALIZED MUSCLE WEAKNESS: ICD-10-CM

## 2020-02-06 DIAGNOSIS — R26.89 BALANCE PROBLEMS: ICD-10-CM

## 2020-02-06 DIAGNOSIS — Z74.09 MOBILITY IMPAIRED: Primary | ICD-10-CM

## 2020-02-06 DIAGNOSIS — R26.9 ABNORMAL GAIT: ICD-10-CM

## 2020-02-06 DIAGNOSIS — G81.91 RIGHT HEMIPARESIS (H): ICD-10-CM

## 2020-02-06 DIAGNOSIS — Z74.09 IMPAIRED FUNCTIONAL MOBILITY, BALANCE, GAIT, AND ENDURANCE: ICD-10-CM

## 2020-02-06 DIAGNOSIS — I61.8 OTHER LEFT-SIDED NONTRAUMATIC INTRACEREBRAL HEMORRHAGE (H): ICD-10-CM

## 2020-02-06 PROCEDURE — 97110 THERAPEUTIC EXERCISES: CPT | Mod: GP | Performed by: PHYSICAL THERAPIST

## 2020-02-06 PROCEDURE — 97112 NEUROMUSCULAR REEDUCATION: CPT | Mod: GP | Performed by: PHYSICAL THERAPIST

## 2020-02-07 ENCOUNTER — HOSPITAL ENCOUNTER (OUTPATIENT)
Dept: PHYSICAL THERAPY | Facility: CLINIC | Age: 55
End: 2020-02-07
Payer: COMMERCIAL

## 2020-02-07 DIAGNOSIS — M62.81 GENERALIZED MUSCLE WEAKNESS: Primary | ICD-10-CM

## 2020-02-07 DIAGNOSIS — Z74.09 MOBILITY IMPAIRED: ICD-10-CM

## 2020-02-07 DIAGNOSIS — I61.8 OTHER LEFT-SIDED NONTRAUMATIC INTRACEREBRAL HEMORRHAGE (H): ICD-10-CM

## 2020-02-07 DIAGNOSIS — R26.89 BALANCE PROBLEMS: ICD-10-CM

## 2020-02-07 DIAGNOSIS — R26.9 ABNORMAL GAIT: ICD-10-CM

## 2020-02-07 DIAGNOSIS — Z74.09 IMPAIRED FUNCTIONAL MOBILITY, BALANCE, GAIT, AND ENDURANCE: ICD-10-CM

## 2020-02-07 PROCEDURE — 97112 NEUROMUSCULAR REEDUCATION: CPT | Mod: GP | Performed by: PHYSICAL THERAPIST

## 2020-02-07 PROCEDURE — 97110 THERAPEUTIC EXERCISES: CPT | Mod: GP | Performed by: PHYSICAL THERAPIST

## 2020-02-10 ENCOUNTER — HOSPITAL ENCOUNTER (OUTPATIENT)
Dept: PHYSICAL THERAPY | Facility: CLINIC | Age: 55
End: 2020-02-10
Payer: COMMERCIAL

## 2020-02-10 DIAGNOSIS — Z74.09 IMPAIRED FUNCTIONAL MOBILITY, BALANCE, GAIT, AND ENDURANCE: ICD-10-CM

## 2020-02-10 DIAGNOSIS — Z74.09 MOBILITY IMPAIRED: ICD-10-CM

## 2020-02-10 DIAGNOSIS — R26.9 ABNORMAL GAIT: ICD-10-CM

## 2020-02-10 DIAGNOSIS — M62.81 GENERALIZED MUSCLE WEAKNESS: Primary | ICD-10-CM

## 2020-02-10 DIAGNOSIS — I61.8 OTHER LEFT-SIDED NONTRAUMATIC INTRACEREBRAL HEMORRHAGE (H): ICD-10-CM

## 2020-02-10 DIAGNOSIS — R26.89 BALANCE PROBLEMS: ICD-10-CM

## 2020-02-10 PROCEDURE — 97110 THERAPEUTIC EXERCISES: CPT | Mod: GP | Performed by: PHYSICAL THERAPIST

## 2020-02-12 ENCOUNTER — OFFICE VISIT (OUTPATIENT)
Dept: INTERNAL MEDICINE | Facility: CLINIC | Age: 55
End: 2020-02-12
Payer: COMMERCIAL

## 2020-02-12 VITALS
TEMPERATURE: 98.2 F | DIASTOLIC BLOOD PRESSURE: 80 MMHG | HEIGHT: 71 IN | OXYGEN SATURATION: 98 % | WEIGHT: 177.7 LBS | SYSTOLIC BLOOD PRESSURE: 110 MMHG | RESPIRATION RATE: 22 BRPM | HEART RATE: 91 BPM | BODY MASS INDEX: 24.88 KG/M2

## 2020-02-12 DIAGNOSIS — I61.8 OTHER NONTRAUMATIC INTRACEREBRAL HEMORRHAGE, UNSPECIFIED LATERALITY (H): ICD-10-CM

## 2020-02-12 DIAGNOSIS — I10 ESSENTIAL HYPERTENSION: ICD-10-CM

## 2020-02-12 DIAGNOSIS — R73.03 PREDIABETES: Primary | ICD-10-CM

## 2020-02-12 PROCEDURE — 99204 OFFICE O/P NEW MOD 45 MIN: CPT | Performed by: INTERNAL MEDICINE

## 2020-02-12 RX ORDER — AMLODIPINE BESYLATE 2.5 MG/1
2.5 TABLET ORAL DAILY
Qty: 90 TABLET | Refills: 1 | Status: SHIPPED | OUTPATIENT
Start: 2020-02-12 | End: 2020-08-04

## 2020-02-12 RX ORDER — SIMVASTATIN 20 MG
20 TABLET ORAL EVERY EVENING
Qty: 90 TABLET | Refills: 1 | Status: SHIPPED | OUTPATIENT
Start: 2020-02-12 | End: 2020-07-01

## 2020-02-12 ASSESSMENT — MIFFLIN-ST. JEOR: SCORE: 1663.17

## 2020-02-12 NOTE — PROGRESS NOTES
Subjective     Good Petty is a 54 year old male who presents to clinic today for the following health issues:    [facility]     Hospital Follow-up Visit:    Hospital/Nursing Home/IP Rehab Facility: [facility]  Date of Admission: 1/15/2020  Date of Discharge: 1/21/2020  Reason(s) for Admission: Stroke/ nontraumatic intracerebral hemorrhage            Problems taking medications regularly:  None       Medication changes since discharge: None       Problems adhering to non-medication therapy:  None    Summary of hospitalization:  [facility] discharge summary reviewed  Diagnostic Tests/Treatments reviewed.  Follow up needed: with neurologist  Other Healthcare Providers Involved in Patient s Care:         Specialist appointment - Neurologist on 02/17/20  Update since discharge: improved.     Post Discharge Medication Reconciliation: discharge medications reconciled, continue medications without change.  Plan of care communicated with patient     Coding guidelines for this visit:  Type of Medical   Decision Making Face-to-Face Visit       within 7 Days of discharge Face-to-Face Visit        within 14 days of discharge   Moderate Complexity [id number]   High Complexity [id number]          Good Petty presented to ER with RLE numbness, tingling, and weakness, in the emergency CT of the head without contrast showed an acute to subacute intraparenchymal hematoma in the left paracentral lobule with mild surrounding vasogenic edema.  MRI of the brain without and with contrast showed a mass within the left paracentral lobule measuring up to 3.2 cm consistent with an intraparenchymal hematoma which was acute to subacute.  The patient was seen by the stroke neurologist, got admitted to the intensive care unit for blood pressure control and further investigation.  Repeat head CT today shows decrease in size of hemorrhage, EEG completed  - Neurology recommending cerebral angiogram as outpatient to  evaluate for the cavernoma, needs repeat MRI brain with contrast in one month, f/u stroke clinic in 6 weeks after MRI. If MRI negative for a cause, will need cerebral angiogram to be set up in the future to investigating non-cavernomatous vascular malformation.    Hypertension Follow-up    Do you check your blood pressure regularly outside of the clinic? No     Are you following a low salt diet? Yes    Are your blood pressures ever more than 140 on the top number (systolic) OR more   than 90 on the bottom number (diastolic), for example 140/90? No    Prediabetes;on diet and has lost wt.      Patient Active Problem List   Diagnosis     ICH (intracerebral hemorrhage) (H)     Stroke (H)     Past Surgical History:   Procedure Laterality Date     HC TOOTH EXTRACTION W/FORCEP       VASECTOMY         Social History     Tobacco Use     Smoking status: Never Smoker     Smokeless tobacco: Never Used   Substance Use Topics     Alcohol use: Never     Frequency: Never     Family History   Problem Relation Age of Onset     Cerebrovascular Disease Maternal Grandmother          Current Outpatient Medications   Medication Sig Dispense Refill     amLODIPine (NORVASC) 2.5 MG tablet Take 1 tablet (2.5 mg) by mouth daily 90 tablet 1     latanoprost (XALATAN) 0.005 % ophthalmic solution Place 1 drop into both eyes daily 1 Bottle 0     simvastatin (ZOCOR) 20 MG tablet Take 1 tablet (20 mg) by mouth every evening 90 tablet 1        Reviewed and updated as needed this visit by Provider         Review of Systems   ROS COMP: CONSTITUTIONAL: NEGATIVE for fever, chills, change in weight  EYES: NEGATIVE for vision changes or irritation  ENT/MOUTH: NEGATIVE for ear, mouth and throat problems  RESP: NEGATIVE for significant cough or SOB  CV: NEGATIVE for chest pain, palpitations or peripheral edema  GI: NEGATIVE for nausea, abdominal pain, heartburn, or change in bowel habits  MUSCULOSKELETAL: weakness LT Leg  NEURO: weakness Lt LE  ENDOCRINE:  "NEGATIVE for temperature intolerance, skin/hair changes  PSYCHIATRIC: NEGATIVE for changes in mood or affect      Objective    /80   Pulse 91   Temp 98.2  F (36.8  C) (Oral)   Resp 22   Ht 1.803 m (5' 11\")   Wt 80.6 kg (177 lb 11.2 oz)   SpO2 98%   BMI 24.78 kg/m    Body mass index is 24.78 kg/m .  Physical Exam   GENERAL: healthy, alert and no distress  EYES: Eyes grossly normal to inspection, PERRL and conjunctivae and sclerae normal  NECK: no adenopathy, no asymmetry, masses, or scars and thyroid normal to palpation  RESP: lungs clear to auscultation - no rales, rhonchi or wheezes  CV: regular rate and rhythm,   MS: no gross musculoskeletal defects noted, no edema, no calf tenderness  NEURO: weakness of Lt LE strength 4/5 , sensory exam grossly normal and mentation intact           Assessment & Plan     (I61.8) Other nontraumatic intracerebral hemorrhage, unspecified laterality (H)  Plan: has appt to see Neurologist on 02/17/20, continue simvastatin (ZOCOR) 20 MG tablet,  Check Lipid panel reflex to direct LDL Fasting, AST, ALT  In 2 months   MRI in 6 wks per neurology            (I10) Essential hypertension  Plan: BP stable, continue amlodipine 2.5 mg daily refilled.explained clearly about the medication,insructions and side effects.      (R73.03) Prediabetes    Plan: Hemoglobin A1c            FUTURE APPOINTMENTS:       - Follow-up visit in 3 months         Radha Trotter MD  [facility]        "

## 2020-02-12 NOTE — LETTER
[place] [address]  [facility] [address]  [phone]          February 12, 2020    RE:  Good ABEL Petty                                                                                                                                                       [id number] [facility] [address]            To whom it may concern:    Good ABEL Petty was seen in clinic on 02/12/20 to follow up on hospital discharge for stroke and has appointment to see Neurologist on 02/17/20 and he will be off work until seen by Neurologist on 02/17/20 .      Sincerely,        Radha Trotter MD

## 2020-02-12 NOTE — NURSING NOTE
"/76   Pulse 91   Temp 98.2  F (36.8  C) (Oral)   Resp 22   Ht 1.803 m (5' 11\")   Wt 80.6 kg (177 lb 11.2 oz)   SpO2 98%   BMI 24.78 kg/m    Patient in to establish care and form.  Amina Haskins CMA    "

## 2020-02-13 ENCOUNTER — HOSPITAL ENCOUNTER (OUTPATIENT)
Dept: PHYSICAL THERAPY | Facility: CLINIC | Age: 55
End: 2020-02-13
Payer: COMMERCIAL

## 2020-02-13 DIAGNOSIS — R26.89 BALANCE PROBLEMS: ICD-10-CM

## 2020-02-13 DIAGNOSIS — I61.8 OTHER LEFT-SIDED NONTRAUMATIC INTRACEREBRAL HEMORRHAGE (H): ICD-10-CM

## 2020-02-13 DIAGNOSIS — G81.91 RIGHT HEMIPARESIS (H): ICD-10-CM

## 2020-02-13 DIAGNOSIS — Z74.09 IMPAIRED FUNCTIONAL MOBILITY, BALANCE, GAIT, AND ENDURANCE: Primary | ICD-10-CM

## 2020-02-13 DIAGNOSIS — R26.9 ABNORMAL GAIT: ICD-10-CM

## 2020-02-13 DIAGNOSIS — Z74.09 MOBILITY IMPAIRED: ICD-10-CM

## 2020-02-13 DIAGNOSIS — M62.81 GENERALIZED MUSCLE WEAKNESS: ICD-10-CM

## 2020-02-13 PROBLEM — R73.03 PREDIABETES: Status: ACTIVE | Noted: 2020-02-13

## 2020-02-13 PROBLEM — I10 ESSENTIAL HYPERTENSION: Status: ACTIVE | Noted: 2020-02-13

## 2020-02-13 PROCEDURE — 97112 NEUROMUSCULAR REEDUCATION: CPT | Mod: GP | Performed by: PHYSICAL THERAPIST

## 2020-02-13 PROCEDURE — 97110 THERAPEUTIC EXERCISES: CPT | Mod: GP | Performed by: PHYSICAL THERAPIST

## 2020-02-17 ENCOUNTER — HOSPITAL ENCOUNTER (OUTPATIENT)
Dept: PHYSICAL THERAPY | Facility: CLINIC | Age: 55
End: 2020-02-17
Payer: COMMERCIAL

## 2020-02-17 DIAGNOSIS — I61.8 OTHER LEFT-SIDED NONTRAUMATIC INTRACEREBRAL HEMORRHAGE (H): ICD-10-CM

## 2020-02-17 DIAGNOSIS — Z74.09 MOBILITY IMPAIRED: ICD-10-CM

## 2020-02-17 DIAGNOSIS — Z74.09 IMPAIRED FUNCTIONAL MOBILITY, BALANCE, GAIT, AND ENDURANCE: ICD-10-CM

## 2020-02-17 DIAGNOSIS — R26.89 BALANCE PROBLEMS: ICD-10-CM

## 2020-02-17 DIAGNOSIS — M62.81 GENERALIZED MUSCLE WEAKNESS: Primary | ICD-10-CM

## 2020-02-17 DIAGNOSIS — R26.9 ABNORMAL GAIT: ICD-10-CM

## 2020-02-17 DIAGNOSIS — G81.91 RIGHT HEMIPARESIS (H): ICD-10-CM

## 2020-02-17 PROCEDURE — 97112 NEUROMUSCULAR REEDUCATION: CPT | Mod: GP | Performed by: PHYSICAL THERAPIST

## 2020-02-17 PROCEDURE — 97110 THERAPEUTIC EXERCISES: CPT | Mod: GP | Performed by: PHYSICAL THERAPIST

## 2020-02-19 ENCOUNTER — HOSPITAL ENCOUNTER (OUTPATIENT)
Dept: PHYSICAL THERAPY | Facility: CLINIC | Age: 55
End: 2020-02-19
Payer: COMMERCIAL

## 2020-02-19 DIAGNOSIS — I61.8 OTHER LEFT-SIDED NONTRAUMATIC INTRACEREBRAL HEMORRHAGE (H): ICD-10-CM

## 2020-02-19 DIAGNOSIS — G81.91 RIGHT HEMIPARESIS (H): ICD-10-CM

## 2020-02-19 DIAGNOSIS — R26.89 BALANCE PROBLEMS: ICD-10-CM

## 2020-02-19 DIAGNOSIS — Z74.09 IMPAIRED FUNCTIONAL MOBILITY, BALANCE, GAIT, AND ENDURANCE: Primary | ICD-10-CM

## 2020-02-19 DIAGNOSIS — R26.9 ABNORMAL GAIT: ICD-10-CM

## 2020-02-19 DIAGNOSIS — Z74.09 MOBILITY IMPAIRED: ICD-10-CM

## 2020-02-19 DIAGNOSIS — M62.81 GENERALIZED MUSCLE WEAKNESS: ICD-10-CM

## 2020-02-19 PROCEDURE — 97110 THERAPEUTIC EXERCISES: CPT | Mod: GP | Performed by: PHYSICAL THERAPIST

## 2020-02-19 PROCEDURE — 97112 NEUROMUSCULAR REEDUCATION: CPT | Mod: GP | Performed by: PHYSICAL THERAPIST

## 2020-02-24 ENCOUNTER — HOSPITAL ENCOUNTER (OUTPATIENT)
Dept: PHYSICAL THERAPY | Facility: CLINIC | Age: 55
End: 2020-02-24
Payer: COMMERCIAL

## 2020-02-24 DIAGNOSIS — Z74.09 MOBILITY IMPAIRED: ICD-10-CM

## 2020-02-24 DIAGNOSIS — G81.91 RIGHT HEMIPARESIS (H): ICD-10-CM

## 2020-02-24 DIAGNOSIS — Z74.09 IMPAIRED FUNCTIONAL MOBILITY, BALANCE, GAIT, AND ENDURANCE: ICD-10-CM

## 2020-02-24 DIAGNOSIS — R26.89 BALANCE PROBLEMS: ICD-10-CM

## 2020-02-24 DIAGNOSIS — I61.8 OTHER LEFT-SIDED NONTRAUMATIC INTRACEREBRAL HEMORRHAGE (H): ICD-10-CM

## 2020-02-24 DIAGNOSIS — R26.9 ABNORMAL GAIT: ICD-10-CM

## 2020-02-24 DIAGNOSIS — M62.81 GENERALIZED MUSCLE WEAKNESS: Primary | ICD-10-CM

## 2020-02-24 PROCEDURE — 97112 NEUROMUSCULAR REEDUCATION: CPT | Mod: GP | Performed by: PHYSICAL THERAPIST

## 2020-02-24 PROCEDURE — 97110 THERAPEUTIC EXERCISES: CPT | Mod: GP | Performed by: PHYSICAL THERAPIST

## 2020-02-27 ENCOUNTER — HOSPITAL ENCOUNTER (OUTPATIENT)
Dept: PHYSICAL THERAPY | Facility: CLINIC | Age: 55
End: 2020-02-27
Payer: COMMERCIAL

## 2020-02-27 DIAGNOSIS — R26.89 BALANCE PROBLEMS: Primary | ICD-10-CM

## 2020-02-27 DIAGNOSIS — Z74.09 IMPAIRED FUNCTIONAL MOBILITY, BALANCE, GAIT, AND ENDURANCE: ICD-10-CM

## 2020-02-27 DIAGNOSIS — R26.9 ABNORMAL GAIT: ICD-10-CM

## 2020-02-27 DIAGNOSIS — I61.8 OTHER LEFT-SIDED NONTRAUMATIC INTRACEREBRAL HEMORRHAGE (H): ICD-10-CM

## 2020-02-27 DIAGNOSIS — M62.81 GENERALIZED MUSCLE WEAKNESS: ICD-10-CM

## 2020-02-27 DIAGNOSIS — G81.91 RIGHT HEMIPARESIS (H): ICD-10-CM

## 2020-02-27 DIAGNOSIS — Z74.09 MOBILITY IMPAIRED: ICD-10-CM

## 2020-02-27 PROCEDURE — 97110 THERAPEUTIC EXERCISES: CPT | Mod: GP | Performed by: PHYSICAL THERAPIST

## 2020-02-27 PROCEDURE — 97112 NEUROMUSCULAR REEDUCATION: CPT | Mod: GP | Performed by: PHYSICAL THERAPIST

## 2020-02-28 NOTE — PROCEDURES
Procedure Date: 2020      LONG-TERM ELECTROENCEPHALOGRAM WITH VIDEO         LTV EEG # FSH -1       START/END DATE/TIME:  Start date is 2020 at 11:25.  End date is 2020 at 9:58.      SOURCE FILE DURATION:  Approximately 23 hours.      PATIENT INFORMATION:  A 54-year-old male who presented with spontaneous intracranial hemorrhage in the left paracentral region.  EEG is being done to evaluate for seizures.      TECHNICAL SUMMARY: This video EEG monitoring procedure was performed with 23 scalp electrodes in 10-20 system placements, and additional scalp, precordial and other surface electrodes used for electrical referencing and artifact detection. Video was reviewed intermittently by EEG technologist and physician for electroclinical seizures.     BACKGROUND:  In the fully awake state, electrocerebral potentials are low voltage seen persistently throughout the entire recording.  Frontal derivations were symmetric beta.  During wakefulness, the background activity consists of synchronous and symmetric, well modulated, 9-10 Hz posterior dominant rhythm. The posterior dominant rhythm attenuated with eye opening. During drowsiness, the background activity waxed and waned and there were periods of slowing and attenuation of the posterior alpha rhythm. Stage I sleep and Stage II sleep was recorded in which synchronous and symmetrical vertex waves , K-complexes and sleep spindles  were identified.  No focal abnormalities were observed.     ACTIVATION PROCEDURES:  Photic stimulation and hyperventilation are not done.      EPILEPTIFORM DISCHARGES:  None.      ICTAL:  None.      IMPRESSION:  LTV day #1 is within normal limits.  No electrographic seizures or epileptiform discharges are seen.  Clinical correlation is advised.         EILEEN MEADOWS MD             D: 2020   T: 2020   MT: YONG      Name:     VARUN VAZQUEZ   MRN:      [id number]        Account:        [id number]   :       1965           Procedure Date: 01/12/2020      Document: [id number]

## 2020-03-02 ENCOUNTER — HOSPITAL ENCOUNTER (OUTPATIENT)
Dept: PHYSICAL THERAPY | Facility: CLINIC | Age: 55
End: 2020-03-02
Payer: COMMERCIAL

## 2020-03-02 DIAGNOSIS — I61.8 OTHER LEFT-SIDED NONTRAUMATIC INTRACEREBRAL HEMORRHAGE (H): ICD-10-CM

## 2020-03-02 DIAGNOSIS — G81.91 RIGHT HEMIPARESIS (H): ICD-10-CM

## 2020-03-02 DIAGNOSIS — M62.81 GENERALIZED MUSCLE WEAKNESS: ICD-10-CM

## 2020-03-02 DIAGNOSIS — Z74.09 IMPAIRED FUNCTIONAL MOBILITY, BALANCE, GAIT, AND ENDURANCE: Primary | ICD-10-CM

## 2020-03-02 DIAGNOSIS — R26.89 BALANCE PROBLEMS: ICD-10-CM

## 2020-03-02 DIAGNOSIS — Z74.09 MOBILITY IMPAIRED: ICD-10-CM

## 2020-03-02 DIAGNOSIS — R26.9 ABNORMAL GAIT: ICD-10-CM

## 2020-03-02 PROCEDURE — 97112 NEUROMUSCULAR REEDUCATION: CPT | Mod: GP | Performed by: PHYSICAL THERAPIST

## 2020-03-02 PROCEDURE — 97110 THERAPEUTIC EXERCISES: CPT | Mod: GP | Performed by: PHYSICAL THERAPIST

## 2020-03-02 NOTE — PROGRESS NOTES
Outpatient Physical Therapy Progress Note     Patient: Good Petty  : 1965    Beginning/End Dates of Reporting Period:  2020 to 3/2/2020, 11 visits total    Referring Provider: Jim Guzman MD     Therapy Diagnosis: Decreased safe functional mobility     Client Self Report: Patient arriving today and states that he is feeling a bit better since after standing at the gym for 3 hours talking with a friend.  Patient reporting concern about being able to return to driving and returning to work - still reports impaired control of right ankle and ability to be on feet for ~ 2 hours before needing to sit and rest.    Objective Measurements:  Objective Measure: FGA  Details: 25/30(Eval: 17/30)  Objective Measure: 30s STS  Details: 15(Eval: 11)  Objective Measure: Treadmill  Details: 10 minutes total at 2.1 mph; walking backwards with B UE support  Objective Measure: sharpened rhomberg  Details: 30 seconds arms at side increased sway    Goals:  Goal Identifier MET: FGA   Goal Description The patient will score 25/30 or greater on FGA assessment to demonstrate a significant improvement in dynamic balance and to show that he is at a minimal risk of falling with participation in community based ambulation.    Target Date 20   Date Met  20   Progress:     Goal Identifier MET: 30s STS   Goal Description The patient will score 13 or more STS transfers without use of UEs and while demonstrating controlled movement without compensation to demonstrate a significant improvement in functional LE strength and to show that he is at a minimal risk of falling.    Target Date 20   Date Met  20   Progress:     Goal Identifier MET: LBP   Goal Description The patient will report a 50% or greater improvement in the severity of back pain to demonstrate a significant improvement in symptom severity in order to facilitate improved mobility and activity tolerance.    Target Date 20   Date Met   03/02/20   Progress:     Goal Identifier HEP   Goal Description The patient will be IND in performance of HEP to facilitate continued gains in strength, endurance, and balance outside of therapy.    Target Date 03/31/20   Date Met      Progress: Patient tolerating progression of HEP     Goal Identifier NEW GOAL: sharpened rhomberg   Goal Description The patient will be able to maintain sharpened rhomberg stance x 30 seconds with arms crossed while demonstrating a normal degree of sway to demonstrate improved postural stability.   Target Date 03/31/20   Date Met      Progress:     Progress Toward Goals:  Patient has met 3 goals and is compliant with current HEP (patient tolerating progression with HEP).  Patient continues to demonstrate impairments with regards to high level balance (static and dynamic), right lower extremity strength, and right lower extremity coordination. Objective scoring indicating minimal risk for falls; patient plans to continue to use SEC when out in the community.  Patient will continue to benefit from skilled physical therapy to address the above listed impairments in order to maximize return to prior level of function.    Plan: Added new goal and decreased frequency to 1x/week    Discharge:  No

## 2020-03-11 ENCOUNTER — HOSPITAL ENCOUNTER (OUTPATIENT)
Dept: LAB | Facility: CLINIC | Age: 55
Discharge: HOME | End: 2020-03-11
Attending: INTERNAL MEDICINE | Admitting: INTERNAL MEDICINE
Payer: COMMERCIAL

## 2020-03-11 ENCOUNTER — HOSPITAL ENCOUNTER (OUTPATIENT)
Dept: PHYSICAL THERAPY | Facility: CLINIC | Age: 55
End: 2020-03-11
Payer: COMMERCIAL

## 2020-03-11 DIAGNOSIS — G81.91 RIGHT HEMIPARESIS (H): ICD-10-CM

## 2020-03-11 DIAGNOSIS — R73.03 PREDIABETES: ICD-10-CM

## 2020-03-11 DIAGNOSIS — R26.9 ABNORMAL GAIT: ICD-10-CM

## 2020-03-11 DIAGNOSIS — Z74.09 IMPAIRED FUNCTIONAL MOBILITY, BALANCE, GAIT, AND ENDURANCE: ICD-10-CM

## 2020-03-11 DIAGNOSIS — R26.89 BALANCE PROBLEMS: ICD-10-CM

## 2020-03-11 DIAGNOSIS — Z74.09 MOBILITY IMPAIRED: Primary | ICD-10-CM

## 2020-03-11 DIAGNOSIS — M62.81 GENERALIZED MUSCLE WEAKNESS: ICD-10-CM

## 2020-03-11 DIAGNOSIS — I61.8 OTHER NONTRAUMATIC INTRACEREBRAL HEMORRHAGE, UNSPECIFIED LATERALITY (H): ICD-10-CM

## 2020-03-11 LAB
ALT SERPL W P-5'-P-CCNC: 29 U/L (ref 0–70)
AST SERPL W P-5'-P-CCNC: 22 U/L (ref 0–45)
CHOLEST SERPL-MCNC: 140 MG/DL
HBA1C MFR BLD: 5.7 % (ref 0–5.6)
HDLC SERPL-MCNC: 58 MG/DL
LDLC SERPL CALC-MCNC: 74 MG/DL
NONHDLC SERPL-MCNC: 82 MG/DL
TRIGL SERPL-MCNC: 41 MG/DL

## 2020-03-11 PROCEDURE — 84460 ALANINE AMINO (ALT) (SGPT): CPT | Performed by: INTERNAL MEDICINE

## 2020-03-11 PROCEDURE — 80061 LIPID PANEL: CPT | Performed by: INTERNAL MEDICINE

## 2020-03-11 PROCEDURE — 97112 NEUROMUSCULAR REEDUCATION: CPT | Mod: GP | Performed by: PHYSICAL THERAPIST

## 2020-03-11 PROCEDURE — 84450 TRANSFERASE (AST) (SGOT): CPT | Performed by: INTERNAL MEDICINE

## 2020-03-11 PROCEDURE — 83036 HEMOGLOBIN GLYCOSYLATED A1C: CPT | Performed by: INTERNAL MEDICINE

## 2020-03-11 PROCEDURE — 36415 COLL VENOUS BLD VENIPUNCTURE: CPT | Performed by: INTERNAL MEDICINE

## 2020-03-16 ENCOUNTER — TRANSFERRED RECORDS (OUTPATIENT)
Dept: HEALTH INFORMATION MANAGEMENT | Facility: CLINIC | Age: 55
End: 2020-03-16

## 2020-03-18 ENCOUNTER — HOSPITAL ENCOUNTER (OUTPATIENT)
Dept: PHYSICAL THERAPY | Facility: CLINIC | Age: 55
End: 2020-03-18
Payer: COMMERCIAL

## 2020-03-18 ENCOUNTER — DOCUMENTATION ONLY (OUTPATIENT)
Dept: INTERNAL MEDICINE | Facility: CLINIC | Age: 55
End: 2020-03-18

## 2020-03-18 DIAGNOSIS — R26.89 BALANCE PROBLEMS: ICD-10-CM

## 2020-03-18 DIAGNOSIS — G81.91 RIGHT HEMIPARESIS (H): ICD-10-CM

## 2020-03-18 DIAGNOSIS — R26.9 ABNORMAL GAIT: ICD-10-CM

## 2020-03-18 DIAGNOSIS — I61.8 OTHER LEFT-SIDED NONTRAUMATIC INTRACEREBRAL HEMORRHAGE (H): ICD-10-CM

## 2020-03-18 DIAGNOSIS — M62.81 GENERALIZED MUSCLE WEAKNESS: ICD-10-CM

## 2020-03-18 DIAGNOSIS — Z74.09 IMPAIRED FUNCTIONAL MOBILITY, BALANCE, GAIT, AND ENDURANCE: Primary | ICD-10-CM

## 2020-03-18 DIAGNOSIS — Z74.09 MOBILITY IMPAIRED: ICD-10-CM

## 2020-03-18 PROCEDURE — 97112 NEUROMUSCULAR REEDUCATION: CPT | Mod: GP | Performed by: PHYSICAL THERAPIST

## 2020-03-18 NOTE — PROGRESS NOTES
Not sure what labs patient needs because he had the labs done on 3/11/2020, may be from outside provider, Please check with patient

## 2020-03-18 NOTE — PROGRESS NOTES
Lab orders resolved by patient's neurologist per pt.     Patient asked if he could have his simvastatin 20mg refilled early since he has been taking BID rather than once daily. Per Dr. Trotter, OK to refill early. Pharmacy contacted and notified. Patient informed.  Amina Haskins CMA

## 2020-06-03 ENCOUNTER — TELEPHONE (OUTPATIENT)
Dept: MEDSURG UNIT | Facility: CLINIC | Age: 55
End: 2020-06-03

## 2020-06-30 DIAGNOSIS — I61.8 OTHER NONTRAUMATIC INTRACEREBRAL HEMORRHAGE, UNSPECIFIED LATERALITY (H): ICD-10-CM

## 2020-07-01 RX ORDER — SIMVASTATIN 20 MG
TABLET ORAL
Qty: 90 TABLET | Refills: 3 | Status: SHIPPED | OUTPATIENT
Start: 2020-07-01 | End: 2021-06-02

## 2020-08-04 ENCOUNTER — APPOINTMENT (OUTPATIENT)
Dept: CT IMAGING | Facility: CLINIC | Age: 55
End: 2020-08-04
Attending: EMERGENCY MEDICINE
Payer: COMMERCIAL

## 2020-08-04 ENCOUNTER — HOSPITAL ENCOUNTER (OUTPATIENT)
Dept: ULTRASOUND IMAGING | Facility: CLINIC | Age: 55
Discharge: HOME | End: 2020-08-04
Attending: INTERNAL MEDICINE | Admitting: INTERNAL MEDICINE
Payer: COMMERCIAL

## 2020-08-04 ENCOUNTER — VIRTUAL VISIT (OUTPATIENT)
Dept: INTERNAL MEDICINE | Facility: CLINIC | Age: 55
End: 2020-08-04
Payer: COMMERCIAL

## 2020-08-04 ENCOUNTER — HOSPITAL ENCOUNTER (EMERGENCY)
Facility: CLINIC | Age: 55
Discharge: HOME | End: 2020-08-04
Attending: EMERGENCY MEDICINE | Admitting: EMERGENCY MEDICINE
Payer: COMMERCIAL

## 2020-08-04 VITALS
RESPIRATION RATE: 16 BRPM | SYSTOLIC BLOOD PRESSURE: 132 MMHG | HEART RATE: 56 BPM | TEMPERATURE: 98 F | OXYGEN SATURATION: 100 % | DIASTOLIC BLOOD PRESSURE: 85 MMHG

## 2020-08-04 VITALS — SYSTOLIC BLOOD PRESSURE: 137 MMHG | DIASTOLIC BLOOD PRESSURE: 88 MMHG

## 2020-08-04 DIAGNOSIS — M79.661 PAIN OF RIGHT LOWER LEG: ICD-10-CM

## 2020-08-04 DIAGNOSIS — I61.8 OTHER LEFT-SIDED NONTRAUMATIC INTRACEREBRAL HEMORRHAGE (H): ICD-10-CM

## 2020-08-04 DIAGNOSIS — I10 ESSENTIAL HYPERTENSION: ICD-10-CM

## 2020-08-04 DIAGNOSIS — I82.4Z1 ACUTE DEEP VEIN THROMBOSIS (DVT) OF DISTAL VEIN OF RIGHT LOWER EXTREMITY (H): ICD-10-CM

## 2020-08-04 DIAGNOSIS — E78.2 MIXED HYPERLIPIDEMIA: ICD-10-CM

## 2020-08-04 DIAGNOSIS — M79.661 PAIN OF RIGHT LOWER LEG: Primary | ICD-10-CM

## 2020-08-04 LAB
ANION GAP SERPL CALCULATED.3IONS-SCNC: 2 MMOL/L (ref 3–14)
BASOPHILS # BLD AUTO: 0 10E9/L (ref 0–0.2)
BASOPHILS NFR BLD AUTO: 0.6 %
BUN SERPL-MCNC: 14 MG/DL (ref 7–30)
CALCIUM SERPL-MCNC: 8.4 MG/DL (ref 8.5–10.1)
CHLORIDE SERPL-SCNC: 106 MMOL/L (ref 94–109)
CO2 SERPL-SCNC: 33 MMOL/L (ref 20–32)
CREAT SERPL-MCNC: 0.97 MG/DL (ref 0.66–1.25)
DIFFERENTIAL METHOD BLD: ABNORMAL
EOSINOPHIL # BLD AUTO: 0.1 10E9/L (ref 0–0.7)
EOSINOPHIL NFR BLD AUTO: 2.3 %
ERYTHROCYTE [DISTWIDTH] IN BLOOD BY AUTOMATED COUNT: 14.8 % (ref 10–15)
GFR SERPL CREATININE-BSD FRML MDRD: 87 ML/MIN/{1.73_M2}
GLUCOSE SERPL-MCNC: 90 MG/DL (ref 70–99)
HCT VFR BLD AUTO: 47.9 % (ref 40–53)
HGB BLD-MCNC: 14.1 G/DL (ref 13.3–17.7)
IMM GRANULOCYTES # BLD: 0 10E9/L (ref 0–0.4)
IMM GRANULOCYTES NFR BLD: 0.5 %
INR PPP: 0.99 (ref 0.86–1.14)
LYMPHOCYTES # BLD AUTO: 1.4 10E9/L (ref 0.8–5.3)
LYMPHOCYTES NFR BLD AUTO: 21.8 %
MCH RBC QN AUTO: 25.4 PG (ref 26.5–33)
MCHC RBC AUTO-ENTMCNC: 29.4 G/DL (ref 31.5–36.5)
MCV RBC AUTO: 86 FL (ref 78–100)
MONOCYTES # BLD AUTO: 0.6 10E9/L (ref 0–1.3)
MONOCYTES NFR BLD AUTO: 9.1 %
NEUTROPHILS # BLD AUTO: 4.1 10E9/L (ref 1.6–8.3)
NEUTROPHILS NFR BLD AUTO: 65.7 %
NRBC # BLD AUTO: 0 10*3/UL
NRBC BLD AUTO-RTO: 0 /100
PLATELET # BLD AUTO: 181 10E9/L (ref 150–450)
POTASSIUM SERPL-SCNC: 4.1 MMOL/L (ref 3.4–5.3)
RBC # BLD AUTO: 5.56 10E12/L (ref 4.4–5.9)
SODIUM SERPL-SCNC: 141 MMOL/L (ref 133–144)
WBC # BLD AUTO: 6.2 10E9/L (ref 4–11)

## 2020-08-04 PROCEDURE — 99285 EMERGENCY DEPT VISIT HI MDM: CPT | Mod: 25

## 2020-08-04 PROCEDURE — 93971 EXTREMITY STUDY: CPT | Mod: RT

## 2020-08-04 PROCEDURE — 80048 BASIC METABOLIC PNL TOTAL CA: CPT | Performed by: EMERGENCY MEDICINE

## 2020-08-04 PROCEDURE — 25000132 ZZH RX MED GY IP 250 OP 250 PS 637: Performed by: EMERGENCY MEDICINE

## 2020-08-04 PROCEDURE — 70450 CT HEAD/BRAIN W/O DYE: CPT

## 2020-08-04 PROCEDURE — 85025 COMPLETE CBC W/AUTO DIFF WBC: CPT | Performed by: EMERGENCY MEDICINE

## 2020-08-04 PROCEDURE — 96372 THER/PROPH/DIAG INJ SC/IM: CPT

## 2020-08-04 PROCEDURE — 99215 OFFICE O/P EST HI 40 MIN: CPT | Mod: 95 | Performed by: INTERNAL MEDICINE

## 2020-08-04 PROCEDURE — 25000128 H RX IP 250 OP 636: Performed by: EMERGENCY MEDICINE

## 2020-08-04 PROCEDURE — 85610 PROTHROMBIN TIME: CPT | Performed by: EMERGENCY MEDICINE

## 2020-08-04 RX ORDER — AMLODIPINE BESYLATE 2.5 MG/1
2.5 TABLET ORAL DAILY
Qty: 90 TABLET | Refills: 1 | Status: SHIPPED | OUTPATIENT
Start: 2020-08-04 | End: 2021-01-11

## 2020-08-04 RX ORDER — WARFARIN SODIUM 5 MG/1
5 TABLET ORAL DAILY
Qty: 7 TABLET | Refills: 0 | Status: SHIPPED | OUTPATIENT
Start: 2020-08-04 | End: 2020-08-07

## 2020-08-04 RX ORDER — WARFARIN SODIUM 5 MG/1
5 TABLET ORAL ONCE
Status: COMPLETED | OUTPATIENT
Start: 2020-08-04 | End: 2020-08-04

## 2020-08-04 RX ADMIN — WARFARIN SODIUM 5 MG: 5 TABLET ORAL at 19:51

## 2020-08-04 RX ADMIN — ENOXAPARIN SODIUM 80 MG: 80 INJECTION SUBCUTANEOUS at 19:51

## 2020-08-04 ASSESSMENT — ENCOUNTER SYMPTOMS
COUGH: 0
SHORTNESS OF BREATH: 0

## 2020-08-04 NOTE — PROGRESS NOTES
"Good Petty is a 55 year old male who is being evaluated via a billable video visit.      The patient has been notified of following:     \"This video visit will be conducted via a call between you and your physician/provider. We have found that certain health care needs can be provided without the need for an in-person physical exam.  This service lets us provide the care you need with a video conversation.  If a prescription is necessary we can send it directly to your pharmacy.  If lab work is needed we can place an order for that and you can then stop by our lab to have the test done at a later time.    Video visits are billed at different rates depending on your insurance coverage.  Please reach out to your insurance provider with any questions.    If during the course of the call the physician/provider feels a video visit is not appropriate, you will not be charged for this service.\"    Patient has given verbal consent for Video visit? Yes  How would you like to obtain your AVS? Mail a copy  If you are dropped from the video visit, the video invite should be resent to: Text to cell phone: [phone]  Will anyone else be joining your video visit? No      Subjective     Good Petty is a 55 year old male who presents today via video visit for the following health issues:    HPI    Video Start Time: 2:34 PM    Pt c/o Rt leg pain and swelling since 3 days , swelling is from knee down until the ankle. No redness but feels tense and has pain when he walks, no h/o injury, pt does bike and walk daily for exercise. No SOB or chest pain .       Hypertension Follow-up      Do you check your blood pressure regularly outside of the clinic? Yes /88    Are you following a low salt diet? Yes    Are your blood pressures ever more than 140 on the top number (systolic) OR more   than 90 on the bottom number (diastolic), for example 140/90? No    Hyperlipidemia Follow-Up      Are you regularly taking any medication " or supplement to lower your cholesterol?   Yes- simvastatin     Are you having muscle aches or other side effects that you think could be caused by your cholesterol lowering medication?  No        Patient Active Problem List   Diagnosis     ICH (intracerebral hemorrhage) (H)     Stroke (H)     Essential hypertension     Prediabetes     Past Surgical History:   Procedure Laterality Date     HC TOOTH EXTRACTION W/FORCEP       VASECTOMY         Social History     Tobacco Use     Smoking status: Never Smoker     Smokeless tobacco: Never Used   Substance Use Topics     Alcohol use: Never     Frequency: Never     Family History   Problem Relation Age of Onset     Cerebrovascular Disease Maternal Grandmother          Current Outpatient Medications   Medication Sig Dispense Refill     amLODIPine (NORVASC) 2.5 MG tablet Take 1 tablet (2.5 mg) by mouth daily 90 tablet 1     latanoprost (XALATAN) 0.005 % ophthalmic solution Place 1 drop into both eyes daily 1 Bottle 0     simvastatin (ZOCOR) 20 MG tablet TAKE 1 TABLET(20 MG) BY MOUTH EVERY EVENING 90 tablet 3       Reviewed and updated as needed this visit by Provider         Review of Systems   CONSTITUTIONAL: NEGATIVE for fever, chills, change in weight  EYES: NEGATIVE for vision changes or irritation  ENT/MOUTH: NEGATIVE for ear, mouth and throat problems  RESP: NEGATIVE for significant cough or SOB  CV: NEGATIVE for chest pain, palpitations or peripheral edema  GI:negative  MUSCULOSKELETAL: rt leg swelling and pain   CNS: h/o stroke   Endocrine;negative  PSYCHIATRIC: NEGATIVE for changes in mood or affect      Objective         Physical Exam     GENERAL: Healthy, alert and no distress  EYES: Eyes grossly normal to inspection.    HENT: Normal cephalic/atraumatic.     RESP: No audible wheeze, cough, or visible cyanosis.  No visible retractions or increased work of breathing.    MS; poor video quality- RT leg appears to be swollen than lt , redness could not be appreciated  due to poor video quality, but had tenderness in calf when pt was asked to press on the calf.  NEURO:    Mentation and speech appropriate for age.  PSYCH: Mentation appears normal, affect normal/bright, judgement and insight intact, normal speech and appearance well-groomed.          Assessment & Plan     (M79.661) Pain of right lower leg     Plan:R/o DVT/Bakers cyst. Will get  US Lower Extremity Venous Duplex Right.pt was told I will contact him after results and proceed accordingly.      (I82.4Z1) Acute deep vein thrombosis (DVT) of distal vein of right lower extremity (H)  Plan: US- showing Extensive deep venous thrombosis of the right lower  extremity including occlusive thrombosis of the right femoral vein in  the distal thigh, entire popliteal vein, posterior tibial veins down  to the mid calf and peroneal veins down to the ankle. Pt needs anticoagulation and has h/o intracerebral hemorrhage , pt was sent to ER for further management       (I10) Essential hypertension  Comment:BP stable   Plan: amLODIPine (NORVASC) 2.5 MG tablet refilled.explained clearly about the medication,insructions and side effects. Advised to follow low salt diet and exercise and f/u in 6 mths.       (E78.2) Mixed hyperlipidemia  Plan: lipids stable, continue simvastatin 20 mg daily .explained clearly about the medication,insructions and side effects.         (I61.8) Other left-sided nontraumatic intracerebral hemorrhage (H)       Radha Trotter MD  [facility]      Video-Visit Details    Type of service:  Video Visit    Video End Time:2:50 PM    Originating Location (pt. Location): Home    Distant Location (provider location):  [facility]     Platform used for Video Visit: Doximity     Follow up 4 wks     Radha Trotter MD

## 2020-08-04 NOTE — ED TRIAGE NOTES
Patient presents with right lower leg swelling/pain since Sunday. Patient had an ultrasound today, positive for DVT so was sent here.

## 2020-08-04 NOTE — ED PROVIDER NOTES
"Will refer the 0.10 olecranon fracture  History   Chief Complaint:  Deep Vein Thrombosis       The history is provided by the patient.      Good Petty is a 55 year old male with history of intracerebral hemorrhage, stroke, hypertension, hyperlipidemia who presents for evaluation of DVT. Per chart review, the patient was admitted in 01/11 and transfer to the [facility] for acute to subacute intraparenchymal hematoma in the left paracentral lobule of unclear etiology on 01/15. He reports that today he had some right leg swelling and has been limping for the past 2 days. He reports that when he uses it or walking he has leg pain but that laying/sitting down on the bed his pain is better almost gone. 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 The patient notes that he has loss of strength on the right side and that he had only 50% on his right side. He denies any other past clotting issues.     Allergies:  No known drug allergies     Medications:   Amlodipine   Simvastatin     Past Medical History:    Anxiety  OCD  Prediabetes  Shoulder capsulitis, right  Stroke  Hypertension   Hyperlipidemia     Past Surgical History:    Vasectomy      Family History:    Cerebrovascular disease  Alcohol drug abuse  Psychiatric illness    Social History:  Smoking status: never smoker  Alcohol use: Never  Drug use: Never  PCP: Radha Trotter  Presents to the ED alone  Up to date on immunization     Review of Systems   Respiratory: Negative for cough and shortness of breath.    Cardiovascular: Positive for leg swelling. Negative for chest pain.   Musculoskeletal:        Right leg swelling, and pain   All other systems reviewed and are negative.        Physical Exam     Patient Vitals for the past 24 hrs:   BP Temp Temp src Pulse Heart Rate Resp SpO2   08/04/20 1900 132/85 -- -- 56 -- -- 100 %   08/04/20 1845 (!) " 134/94 -- -- 58 -- -- --   08/04/20 1800 (!) 140/86 -- -- 56 -- -- 99 %   08/04/20 1745 (!) 134/99 -- -- 69 -- -- 99 %   08/04/20 1720 (!) 127/94 98  F (36.7  C) Oral -- 60 16 100 %       Physical Exam  General: The patient is alert, in no respiratory distress.    HENT: Mucous membranes moist.    Cardiovascular: Regular rate and rhythm. Good pulses in all four extremities. Normal capillary refill and skin turgor.     Respiratory: Lungs are clear. No nasal flaring. No retractions. No wheezing, no crackles.    Gastrointestinal: Abdomen soft. No guarding, no rebound. No palpable hernias.     Musculoskeletal: No gross deformity. Swelling of the right leg with mild weakness.     Skin: No rashes or petechiae.     Neurologic: The patient is alert and oriented x3. GCS 15. No testable cranial nerve deficit. Follows commands with clear and appropriate speech. Gives appropriate answers. Good strength in all extremities. No gross neurologic deficit. Gross sensation intact. Pupils are round and reactive. No meningismus.     Lymphatic: No cervical adenopathy.     Psychiatric: The patient is non-tearful.     Emergency Department Course     Imaging:  Radiology findings were communicated with the patient who voiced understanding of the findings.    Head CT:  1. No evidence for intracranial hemorrhage.     Reading per radiology    Laboratory:  Laboratory findings were communicated with the patient who voiced understanding of the findings.    CBC: WBC 6.2, HGB 14.1,   BMP: Carbon dioxide 33(H), Anion Gap 2(L), Calcium 8.4(L) o/w WNL (Creatinine 0.97)  INR: 0.99     Interventions:  1951 Warfarin, 5mg, PO  1951 Lovenox, 80 mg, IM    Emergency Department Course:   Nursing notes and vitals reviewed.    1745 I performed an exam of the patient as documented above.     1752 IV was inserted and blood was drawn for laboratory testing, results above.     1756 I spoke with Dr. Nevarez of the Stroke/Neurology service regarding patient's  presentation, findings, and plan of care.     1805 The patient was sent for a Head CT while in the emergency department, results above.      1853 I spoke with Dr. Gant of the Stroke/Neurology service regarding patient's presentation, findings, and plan of care.     1857 I spoke with Dr. Nevarez of the Vascular service regarding patient's presentation, findings, and plan of care.     1905 I personally reviewed the results with the patient and answered all related questions prior to discharge.    Impression & Plan      Medical Decision Making:  Good Petty is a 55 year old male who presents to the emergency department today for evaluation of left leg swelling.  The patient records were reviewed and he had a interval cranial hemorrhage for an undetermined reason.  Originally was hesitant about anticoagulating him however I did consult stroke neurology who recommended a head CT and said that since that previously had been so long ago the risk-benefit was for treatment DVT and he was unlikely to have hemorrhage.  I discussed this risk with the patient and discussed he could have a another spontaneous hemorrhage however the medications we have him on are reversible.  I did discuss case with vascular surgery who did not feel any other interventions required if he could not anticoagulant they said he can get a filter but I do not think this would help him out.  There is no signs of phlegmasia he had good distal pulses good circulation and was discharged home in good condition on Coumadin and Lovenox.  He is aware that he is to return if he develops headache or other symptoms and needs to follow-up with his primary care doctor for INR check..     Diagnosis:    ICD-10-CM    1. Acute deep vein thrombosis (DVT) of distal vein of right lower extremity (H)  I82.4Z1        Disposition:   The patient is discharged to home.     Discharge Medications:  New Prescriptions    ENOXAPARIN ANTICOAGULANT (LOVENOX) 80 MG/0.8ML SYRINGE     Inject 0.8 mLs (80 mg) Subcutaneous 2 times daily for 14 days    WARFARIN ANTICOAGULANT (COUMADIN) 5 MG TABLET    Take 1 tablet (5 mg) by mouth daily       Scribe Disclosure:  I, Sonja Rodríguez, am serving as a scribe at 5:29 PM on 8/4/2020 to document services personally performed by Willem Novoa MD based on my observations and the provider's statements to me.  [facility] EMERGENCY DEPARTMENT        Willem Novoa MD  08/04/20 2931

## 2020-08-04 NOTE — NURSING NOTE
patient states that his rt leg is swollen below the knee and is hard to the touch. Not warm to the touch or red, somewhat painful.

## 2020-08-05 ENCOUNTER — DOCUMENTATION ONLY (OUTPATIENT)
Dept: INTERNAL MEDICINE | Facility: CLINIC | Age: 55
End: 2020-08-05

## 2020-08-05 DIAGNOSIS — I82.4Z1 ACUTE DEEP VEIN THROMBOSIS (DVT) OF DISTAL VEIN OF RIGHT LOWER EXTREMITY (H): Primary | ICD-10-CM

## 2020-08-06 ENCOUNTER — TELEPHONE (OUTPATIENT)
Dept: INTERNAL MEDICINE | Facility: CLINIC | Age: 55
End: 2020-08-06

## 2020-08-06 NOTE — TELEPHONE ENCOUNTER
Patient is calling to ask if he can return to his normal activities. He is a [employer]  and also he wants to know if he can ride his bike. He is also wondering what could have caused his blood clots.

## 2020-08-07 ENCOUNTER — ANTICOAGULATION THERAPY VISIT (OUTPATIENT)
Dept: ANTICOAGULATION | Facility: CLINIC | Age: 55
End: 2020-08-07

## 2020-08-07 ENCOUNTER — DOCUMENTATION ONLY (OUTPATIENT)
Dept: LAB | Facility: CLINIC | Age: 55
End: 2020-08-07

## 2020-08-07 DIAGNOSIS — I82.4Z1 ACUTE DEEP VEIN THROMBOSIS (DVT) OF DISTAL VEIN OF RIGHT LOWER EXTREMITY (H): ICD-10-CM

## 2020-08-07 DIAGNOSIS — I82.4Z1 ACUTE DEEP VEIN THROMBOSIS (DVT) OF DISTAL VEIN OF RIGHT LOWER EXTREMITY (H): Primary | ICD-10-CM

## 2020-08-07 LAB
CAPILLARY BLOOD COLLECTION: NORMAL
INR PPP: 1.5 (ref 0.86–1.14)

## 2020-08-07 PROCEDURE — 36416 COLLJ CAPILLARY BLOOD SPEC: CPT | Performed by: INTERNAL MEDICINE

## 2020-08-07 PROCEDURE — 85610 PROTHROMBIN TIME: CPT | Performed by: INTERNAL MEDICINE

## 2020-08-07 PROCEDURE — 99207 ZZC NO CHARGE NURSE ONLY: CPT | Performed by: INTERNAL MEDICINE

## 2020-08-07 RX ORDER — WARFARIN SODIUM 5 MG/1
5 TABLET ORAL DAILY
Qty: 90 TABLET | Refills: 0 | Status: SHIPPED | OUTPATIENT
Start: 2020-08-07 | End: 2020-08-18

## 2020-08-07 NOTE — PROGRESS NOTES
Good has a lab only appointment on Monday, 8/10/20. Please place future lab orders for INR. Thanks, Nunu

## 2020-08-07 NOTE — TELEPHONE ENCOUNTER
Pt can be active as long as he has no pain, if pain then advise to limit activities, elevate LE , I have given him the referral for hematologist to determine the cause of his DVT and they will call his to schedule appt

## 2020-08-07 NOTE — PROGRESS NOTES
ANTICOAGULATION INITIAL CLINIC VISIT    Patient Name:  Good Petty  Date:  8/7/2020  Referred by: Dr. Ole Trotter  Contact Type:  Telephone/ discussed with patient.    SUBJECTIVE:  Coumadin education was completed today.  Topics covered include:  -Introduction to coumadin  -Proper Administration  -INR Testing  -Sign/Symptoms of Bleeding  -Signs/Symptoms of Clot Formation or Stroke  -Dietary Intake of Vitamin K  -Drug Interactions  -Anticoagulation Identification (bracelet, necklace or wallet card)  -Future Surgery  -Effects of Alcohol, Tobacco, and Exercise on Coumadin      Patient Findings     Comments:   Warfarin started on 8/11 for DVT.  Also using lovenox bridging.  History of intercerebral hemorrhage. Patient is active (usually bikes about 1.5 hours daily).  Advised patient that it is important to stay active, but he should cut back on his activity if he is experiencing pain or swelling.  The patient was assessed for diet, medication, and activity level changes, missed or extra doses, bruising or bleeding, with no problem findings          Clinical Outcomes     Negatives:   Major bleeding event, Thromboembolic event, Anticoagulation-related hospital admission, Anticoagulation-related ED visit, Anticoagulation-related fatality    Comments:   Warfarin started on 8/11 for DVT.  Also using lovenox bridging.  History of intercerebral hemorrhage. Patient is active (usually bikes about 1.5 hours daily).  Advised patient that it is important to stay active, but he should cut back on his activity if he is experiencing pain or swelling.  The patient was assessed for diet, medication, and activity level changes, missed or extra doses, bruising or bleeding, with no problem findings            OBJECTIVE    Recent labs: (last 7 days)     08/07/20  1337   INR 1.50*       ASSESSMENT / PLAN  INR assessment SUB    Recheck INR In: 3 DAYS    INR Location Clinic      Anticoagulation Summary  As of 8/7/2020    INR goal:    2.0-3.0   TTR:   --   INR used for dosin.50! (2020)   Warfarin maintenance plan:   5 mg (5 mg x 1) every day   Full warfarin instructions:   : 5 mg; : 5 mg; : 5 mg; Otherwise 5 mg every day   Weekly warfarin total:   35 mg   Plan last modified:   Aline Up RN (2020)   Next INR check:   8/10/2020   Target end date:   3/5/2021    Indications    Acute deep vein thrombosis (DVT) of distal vein of right lower extremity (H) [I82.4Z1]             Anticoagulation Episode Summary     INR check location:       Preferred lab:       Send INR reminders to:   [facility]    Comments:   hx of intercerebral hemorrhage      Anticoagulation Care Providers     Provider Role Specialty Phone number    Radha Trotter MD Referring Internal Medicine [phone]            See the Encounter Report to view Anticoagulation Flowsheet and Dosing Calendar (Go to Encounters tab in chart review, and find the Anticoagulation Therapy Visit)    Dosage adjustment made based on physician directed care plan.    Aline Up RN

## 2020-08-10 ENCOUNTER — DOCUMENTATION ONLY (OUTPATIENT)
Dept: LAB | Facility: CLINIC | Age: 55
End: 2020-08-10

## 2020-08-10 ENCOUNTER — ANTICOAGULATION THERAPY VISIT (OUTPATIENT)
Dept: ANTICOAGULATION | Facility: CLINIC | Age: 55
End: 2020-08-10

## 2020-08-10 DIAGNOSIS — I82.4Z1 ACUTE DEEP VEIN THROMBOSIS (DVT) OF DISTAL VEIN OF RIGHT LOWER EXTREMITY (H): ICD-10-CM

## 2020-08-10 DIAGNOSIS — Z79.01 LONG TERM CURRENT USE OF ANTICOAGULANTS WITH INR GOAL OF 2.0-3.0: Primary | ICD-10-CM

## 2020-08-10 LAB
CAPILLARY BLOOD COLLECTION: NORMAL
INR PPP: 2.6 (ref 0.86–1.14)

## 2020-08-10 PROCEDURE — 36416 COLLJ CAPILLARY BLOOD SPEC: CPT | Performed by: INTERNAL MEDICINE

## 2020-08-10 PROCEDURE — 85610 PROTHROMBIN TIME: CPT | Performed by: INTERNAL MEDICINE

## 2020-08-10 PROCEDURE — 99207 ZZC NO CHARGE NURSE ONLY: CPT | Performed by: INTERNAL MEDICINE

## 2020-08-10 NOTE — PROGRESS NOTES
Discussed with ACC RN, had rapid rise, could do up to 15% decrease, will adjust dose ~7% today, recheck Thursday, and decide if decrease to ~14% total by adding 2 days of 2.5mg after see response to INR.  Also may have inflammation from leg/VTE diminish by that time.    Claudia Hodges, PharmD BCACP  Anticoagulation Clinical Pharmacist

## 2020-08-10 NOTE — PROGRESS NOTES
Left message to call [phone]. Please transfer call to Clare at [phone].  Clare Hodges RN

## 2020-08-10 NOTE — PROGRESS NOTES
ANTICOAGULATION FOLLOW-UP CLINIC VISIT    Patient Name:  Good Petty  Date:  8/10/2020  Contact Type:  Telephone/ Patient returned call, patient denies any changes since last INR check. Orders discussed with patient, he agrees with the plan. Lab INR appointment was already scheduled on 20.    SUBJECTIVE:  Patient Findings     Positives:   Change in medications (Initiation of warfarin therapy. Patient will stop Lovenox today, already had Lovenox this AM)    Comments:   The patient was assessed for diet, medication, and activity level changes, missed or extra doses, bruising or bleeding, with no problem findings. Patient reports he took his warfarin this morning and just continued taking 5 mg. Patient was advised he should wait until INR nurse lets him know how much warfarin to take, patient voiced understanding. Patient will begin to take warfarin in the evening starting 20.        Clinical Outcomes     Comments:   The patient was assessed for diet, medication, and activity level changes, missed or extra doses, bruising or bleeding, with no problem findings. Patient reports he took his warfarin this morning and just continued taking 5 mg. Patient was advised he should wait until INR nurse lets him know how much warfarin to take, patient voiced understanding. Patient will begin to take warfarin in the evening starting 20.           OBJECTIVE    Recent labs: (last 7 days)     08/10/20  0942   INR 2.60*       ASSESSMENT / PLAN  INR assessment THER    Recheck INR In: 3 DAYS    INR Location Outside lab      Anticoagulation Summary  As of 8/10/2020    INR goal:   2.0-3.0   TTR:   --   INR used for dosin.60 (8/10/2020)   Warfarin maintenance plan:   2.5 mg (5 mg x 0.5) every Mon; 5 mg (5 mg x 1) all other days   Full warfarin instructions:   8/10: 5 mg; : 2.5 mg; Otherwise 2.5 mg every Mon; 5 mg all other days   Weekly warfarin total:   32.5 mg   Plan last modified:   Clare Hodges RN  (8/10/2020)   Next INR check:   8/13/2020   Target end date:   3/5/2021    Indications    Acute deep vein thrombosis (DVT) of distal vein of right lower extremity (H) [I82.4Z1]             Anticoagulation Episode Summary     INR check location:       Preferred lab:       Send INR reminders to:   [facility]    Comments:   hx of intercerebral hemorrhage, keep INR between 2.0-2.5.      Anticoagulation Care Providers     Provider Role Specialty Phone number    Radha Trotter MD Referring Internal Medicine [phone]            See the Encounter Report to view Anticoagulation Flowsheet and Dosing Calendar (Go to Encounters tab in chart review, and find the Anticoagulation Therapy Visit)    Dosage adjustment made based on physician directed care plan.    Clare Hodges RN

## 2020-08-10 NOTE — PROGRESS NOTES
This patient has made a lab appointment for INR. Please place an INR order (not point of care) as future. Thank you, Zach lab staff, Land[place]

## 2020-08-11 NOTE — PROGRESS NOTES
Standing orders for INR entered. Next INR 8/13/20.    Julia NEELY RN  Anticoagulation Clinic  [place]

## 2020-08-13 ENCOUNTER — ANTICOAGULATION THERAPY VISIT (OUTPATIENT)
Dept: NURSING | Facility: CLINIC | Age: 55
End: 2020-08-13
Payer: COMMERCIAL

## 2020-08-13 DIAGNOSIS — I82.4Z1 ACUTE DEEP VEIN THROMBOSIS (DVT) OF DISTAL VEIN OF RIGHT LOWER EXTREMITY (H): ICD-10-CM

## 2020-08-13 DIAGNOSIS — Z79.01 LONG TERM CURRENT USE OF ANTICOAGULANTS WITH INR GOAL OF 2.0-3.0: ICD-10-CM

## 2020-08-13 LAB
CAPILLARY BLOOD COLLECTION: NORMAL
INR PPP: 1.8 (ref 0.86–1.14)

## 2020-08-13 PROCEDURE — 99207 ZZC NO CHARGE NURSE ONLY: CPT

## 2020-08-13 PROCEDURE — 85610 PROTHROMBIN TIME: CPT | Performed by: FAMILY MEDICINE

## 2020-08-13 PROCEDURE — 36416 COLLJ CAPILLARY BLOOD SPEC: CPT | Performed by: FAMILY MEDICINE

## 2020-08-13 NOTE — PROGRESS NOTES
ANTICOAGULATION FOLLOW-UP CLINIC VISIT    Patient Name:  Good Petty  Date:  8/13/2020  Contact Type:  Telephone    SUBJECTIVE:  Patient Findings     Comments:   The patient was assessed for diet, medication, and activity level changes, missed or extra doses, bruising or bleeding, with no problem findings.  Probable reason for drop in INR is from decreased warfarin dose on 08/11/20 and perhaps resolving inflammation from DVT.  I spoke to ARTHUR Taylor PharmD, she agrees with increasing warfarin maintenance dose ~7% to 35mg/week and also to give booster dose of 7.5mg today.  ACC RN to consult with Claudia on 08/14/20, if INR drops, patient may need to resume Lovenox bridging over the weekend, if INR rising, can proceed with 5mg warfarin daily and check INR again on 08/17/20 (appt. Already scheduled).  Patient verbalized understanding of the plan and repeated warfarin dosing back to me x 2.        Clinical Outcomes     Negatives:   Major bleeding event, Thromboembolic event, Anticoagulation-related hospital admission, Anticoagulation-related ED visit, Anticoagulation-related fatality    Comments:   The patient was assessed for diet, medication, and activity level changes, missed or extra doses, bruising or bleeding, with no problem findings.  Probable reason for drop in INR is from decreased warfarin dose on 08/11/20 and perhaps resolving inflammation from DVT.  I spoke to ARTHUR Taylor PharmD, she agrees with increasing warfarin maintenance dose ~7% to 35mg/week and also to give booster dose of 7.5mg today.  ACC RN to consult with Claudia on 08/14/20, if INR drops, patient may need to resume Lovenox bridging over the weekend, if INR rising, can proceed with 5mg warfarin daily and check INR again on 08/17/20 (appt. Already scheduled).  Patient verbalized understanding of the plan and repeated warfarin dosing back to me x 2.           OBJECTIVE    Recent labs: (last 7 days)     08/13/20  1312   INR 1.80*        ASSESSMENT / PLAN  INR assessment SUB    Recheck INR In: 1 DAY    INR Location Clinic      Anticoagulation Summary  As of 2020    INR goal:   2.0-3.0   TTR:   --   INR used for dosin.80! (2020)   Warfarin maintenance plan:   5 mg (5 mg x 1) every day   Full warfarin instructions:   : 7.5 mg; Otherwise 5 mg every day   Weekly warfarin total:   35 mg   Plan last modified:   Francesca Hines RN (2020)   Next INR check:   2020   Priority:   High   Target end date:   3/5/2021    Indications    Acute deep vein thrombosis (DVT) of distal vein of right lower extremity (H) [I82.4Z1]             Anticoagulation Episode Summary     INR check location:       Preferred lab:       Send INR reminders to:   [facility]    Comments:   hx of intercerebral hemorrhage, keep INR between 2.0-2.5.      Anticoagulation Care Providers     Provider Role Specialty Phone number    Radha Trotter MD Referring Internal Medicine [phone]            See the Encounter Report to view Anticoagulation Flowsheet and Dosing Calendar (Go to Encounters tab in chart review, and find the Anticoagulation Therapy Visit)        Francesca Hines RN

## 2020-08-14 ENCOUNTER — APPOINTMENT (OUTPATIENT)
Dept: ULTRASOUND IMAGING | Facility: CLINIC | Age: 55
End: 2020-08-14
Attending: EMERGENCY MEDICINE
Payer: COMMERCIAL

## 2020-08-14 ENCOUNTER — HOSPITAL ENCOUNTER (EMERGENCY)
Facility: CLINIC | Age: 55
Discharge: HOME | End: 2020-08-14
Attending: EMERGENCY MEDICINE | Admitting: EMERGENCY MEDICINE
Payer: COMMERCIAL

## 2020-08-14 VITALS
OXYGEN SATURATION: 100 % | BODY MASS INDEX: 24.5 KG/M2 | HEART RATE: 64 BPM | SYSTOLIC BLOOD PRESSURE: 117 MMHG | HEIGHT: 71 IN | RESPIRATION RATE: 18 BRPM | TEMPERATURE: 98.7 F | WEIGHT: 175 LBS | DIASTOLIC BLOOD PRESSURE: 84 MMHG

## 2020-08-14 DIAGNOSIS — Z79.01 LONG TERM CURRENT USE OF ANTICOAGULANTS WITH INR GOAL OF 2.0-3.0: ICD-10-CM

## 2020-08-14 DIAGNOSIS — I82.4Z1 ACUTE DEEP VEIN THROMBOSIS (DVT) OF DISTAL VEIN OF RIGHT LOWER EXTREMITY (H): ICD-10-CM

## 2020-08-14 DIAGNOSIS — Z86.718 HISTORY OF DEEP VENOUS THROMBOSIS: ICD-10-CM

## 2020-08-14 LAB
ANION GAP SERPL CALCULATED.3IONS-SCNC: 2 MMOL/L (ref 3–14)
BASOPHILS # BLD AUTO: 0 10E9/L (ref 0–0.2)
BASOPHILS NFR BLD AUTO: 0.4 %
BUN SERPL-MCNC: 17 MG/DL (ref 7–30)
CALCIUM SERPL-MCNC: 8.4 MG/DL (ref 8.5–10.1)
CAPILLARY BLOOD COLLECTION: NORMAL
CHLORIDE SERPL-SCNC: 107 MMOL/L (ref 94–109)
CO2 SERPL-SCNC: 31 MMOL/L (ref 20–32)
CREAT SERPL-MCNC: 0.98 MG/DL (ref 0.66–1.25)
DIFFERENTIAL METHOD BLD: ABNORMAL
EOSINOPHIL # BLD AUTO: 0.2 10E9/L (ref 0–0.7)
EOSINOPHIL NFR BLD AUTO: 2.2 %
ERYTHROCYTE [DISTWIDTH] IN BLOOD BY AUTOMATED COUNT: 14.8 % (ref 10–15)
GFR SERPL CREATININE-BSD FRML MDRD: 86 ML/MIN/{1.73_M2}
GLUCOSE SERPL-MCNC: 95 MG/DL (ref 70–99)
HCT VFR BLD AUTO: 48.5 % (ref 40–53)
HGB BLD-MCNC: 14.6 G/DL (ref 13.3–17.7)
IMM GRANULOCYTES # BLD: 0 10E9/L (ref 0–0.4)
IMM GRANULOCYTES NFR BLD: 0.3 %
INR PPP: 2.04 (ref 0.86–1.14)
INR PPP: 2.1 (ref 0.86–1.14)
LYMPHOCYTES # BLD AUTO: 2.1 10E9/L (ref 0.8–5.3)
LYMPHOCYTES NFR BLD AUTO: 29.6 %
MCH RBC QN AUTO: 25.3 PG (ref 26.5–33)
MCHC RBC AUTO-ENTMCNC: 30.1 G/DL (ref 31.5–36.5)
MCV RBC AUTO: 84 FL (ref 78–100)
MONOCYTES # BLD AUTO: 0.6 10E9/L (ref 0–1.3)
MONOCYTES NFR BLD AUTO: 8.9 %
NEUTROPHILS # BLD AUTO: 4.2 10E9/L (ref 1.6–8.3)
NEUTROPHILS NFR BLD AUTO: 58.6 %
NRBC # BLD AUTO: 0 10*3/UL
NRBC BLD AUTO-RTO: 0 /100
PLATELET # BLD AUTO: 232 10E9/L (ref 150–450)
POTASSIUM SERPL-SCNC: 4.3 MMOL/L (ref 3.4–5.3)
RBC # BLD AUTO: 5.77 10E12/L (ref 4.4–5.9)
SODIUM SERPL-SCNC: 140 MMOL/L (ref 133–144)
WBC # BLD AUTO: 7.2 10E9/L (ref 4–11)

## 2020-08-14 PROCEDURE — 93971 EXTREMITY STUDY: CPT | Mod: RT

## 2020-08-14 PROCEDURE — 99285 EMERGENCY DEPT VISIT HI MDM: CPT | Mod: 25

## 2020-08-14 PROCEDURE — 80048 BASIC METABOLIC PNL TOTAL CA: CPT | Performed by: EMERGENCY MEDICINE

## 2020-08-14 PROCEDURE — 36416 COLLJ CAPILLARY BLOOD SPEC: CPT | Performed by: PHYSICIAN ASSISTANT

## 2020-08-14 PROCEDURE — 85025 COMPLETE CBC W/AUTO DIFF WBC: CPT | Performed by: EMERGENCY MEDICINE

## 2020-08-14 PROCEDURE — 85610 PROTHROMBIN TIME: CPT | Performed by: EMERGENCY MEDICINE

## 2020-08-14 PROCEDURE — 85610 PROTHROMBIN TIME: CPT | Performed by: PHYSICIAN ASSISTANT

## 2020-08-14 ASSESSMENT — ENCOUNTER SYMPTOMS
ARTHRALGIAS: 1
NUMBNESS: 1

## 2020-08-14 ASSESSMENT — MIFFLIN-ST. JEOR: SCORE: 1645.92

## 2020-08-14 NOTE — ED TRIAGE NOTES
Pt presents for evaluation of RLE pain. Was seen here for it last Tuesday for same thing and was dx with blood clots, started on blood thinners. Currently on coumadin. Last night, pt had pain in right calf down to foot. Pain was rated 9/10, but has improved. Denies any chest pain or shortness of breath. Pt feels he may have overworked. Does [employer], 8 hr shifts.

## 2020-08-14 NOTE — ED PROVIDER NOTES
"  History     Chief Complaint: Leg Pain    HPI   Good Petty is a 55 year old male with a history of deep venous thrombosis of the right leg and stroke on warfarin who presents with right leg pain. He notes he works as an [employer]  and drove around for 8 hours with minimal breaks, in addition to bicycling before work. He states he didn't get out of the car as much as he should've. He notes he has some pain in his right leg, which he rated as a 9/10 last night and has since decreased to a 2/10. He mentions a stroke in January that resulted in right lower extremity numbness but notes the numbness is less now than it was then.     Allergies:  No known drug allergies    Medications:    Simvastatin  Amlodipine  Coumadin    Past Medical History:    Anxiety  Prediabetes  Hyperlipidemia  Hypertension  Stroke  Intracerebral hemorrhage  Right shoulder capsulitis    Past Surgical History:    Tooth extraction  Vasectomy    Family History:    History reviewed. No pertinent family history.     Social History:  Smoking status: Never  Alcohol use: No  Drug use: No  PCP: Radha Trotter  Presents to the ED alone  Marital Status:   [2]    Review of Systems   Musculoskeletal: Positive for arthralgias (leg pain).   Neurological: Positive for numbness.   All other systems reviewed and are negative.      Physical Exam     Patient Vitals for the past 24 hrs:   BP Temp Temp src Heart Rate Resp SpO2 Height Weight   08/14/20 1708 124/89 98.7  F (37.1  C) Oral 71 18 100 % 1.803 m (5' 11\") 79.4 kg (175 lb)     Physical Exam  Nursing note and vitals reviewed.  General: Patient is alert and interactive when I enter the room  Head:  The scalp, face, and head appear normal  Eyes:  Conjunctivae are normal  ENT:    The nose is normal    Pinnae are normal  Neck:  Trachea midline  CV:  Normal rate, regular rhythm. DP pulses normal.   Resp:  No respiratory distress   Musc:  Normal muscular tone    No pain to palpation right " calf/leg.   Skin:  No rash or lesions noted  Neuro: Speech is normal and fluent. Face is symmetric. Moving all extremities well.   Psych:  Awake. Alert.  Normal affect.  Appropriate interactions.      Emergency Department Course     Imaging:  Radiographic findings were communicated with the patient who voiced understanding of the findings.    US Lower Extremity Venous Duplex Right:  Similar occlusive and nonocclusive DVT with slight improvement within the peroneal vein and worsening within posterior tibial vein within the calf.   As read by Radiology.    Laboratory:  CBC: WNL (WBC 7.2, HGB 14.6, )  BMP: Anion gap 2 (L), Calcium 8.4 (L) o/w WNL (Creatinine 0.98)  INR: 2.04 (H)    Procedures: None.    Emergency Department Course:  Past medical records, nursing notes, and vitals reviewed.  1713: I performed an exam of the patient and obtained history, as documented above.    IV inserted and Blood drawn. This was sent to the lab for further testing, results above.    The patient was sent for a Lower Extremity US while in the emergency department, findings above.    1953: I rechecked the patient. Findings and plan explained to the patient. Patient discharged home with instructions regarding supportive care, medications, and reasons to return. The importance of close follow-up was reviewed.     Impression & Plan      Medical Decision Making:  Pt presents with RLE pain. He was recently seen and diagnosed with RLE DVT. Pain was severe last night after he bicycled in the morning and then worked as [employer]  for 8 hours with minimal breaks. His pain is much improved now. U/s repeated and clot is confined to the calf. Would not change therapy at this time. He is in stable condition at the time of discharge, indications for return to the ED were discussed as well as follow up. All questions were answered and he is in agreement with the plan.      Diagnosis:    ICD-10-CM    1. History of deep venous thrombosis  Z86.718         Disposition: discharged to home    Discharge Medications:  New Prescriptions    No medications on file     I, Florian Burrows, am serving as a scribe at 5:22 PM on 8/14/2020 to document services personally performed by Sergey Boogie MD based on my observations and the provider's statements to me.     Florian Burrows  8/14/2020   [facility] EMERGENCY DEPARTMENT       Sergey Boogie MD  08/15/20 0210

## 2020-08-14 NOTE — ED AVS SNAPSHOT
[facility] Emergency Department  [address]  [facility] [address]  Phone:  [phone]  Fax:  [phone]                                    Good Petty   MRN: [id number]    Department:  [facility] Emergency Department   Date of Visit:  8/14/2020           After Visit Summary Signature Page    I have received my discharge instructions, and my questions have been answered. I have discussed any challenges I see with this plan with the nurse or doctor.    ..........................................................................................................................................  Patient/Patient Representative Signature      ..........................................................................................................................................  Patient Representative Print Name and Relationship to Patient    ..................................................               ................................................  Date                                   Time    ..........................................................................................................................................  Reviewed by Signature/Title    ...................................................              ..............................................  Date                                               Time          22EPIC Rev 08/18

## 2020-08-17 ENCOUNTER — TELEPHONE (OUTPATIENT)
Dept: OTHER | Facility: CLINIC | Age: 55
End: 2020-08-17

## 2020-08-17 ENCOUNTER — DOCUMENTATION ONLY (OUTPATIENT)
Dept: NURSING | Facility: CLINIC | Age: 55
End: 2020-08-17

## 2020-08-17 ENCOUNTER — TRANSFERRED RECORDS (OUTPATIENT)
Dept: HEALTH INFORMATION MANAGEMENT | Facility: CLINIC | Age: 55
End: 2020-08-17

## 2020-08-17 ENCOUNTER — ANTICOAGULATION THERAPY VISIT (OUTPATIENT)
Dept: ANTICOAGULATION | Facility: CLINIC | Age: 55
End: 2020-08-17

## 2020-08-17 DIAGNOSIS — I82.4Z1 ACUTE DEEP VEIN THROMBOSIS (DVT) OF DISTAL VEIN OF RIGHT LOWER EXTREMITY (H): ICD-10-CM

## 2020-08-17 DIAGNOSIS — Z79.01 LONG TERM CURRENT USE OF ANTICOAGULANTS WITH INR GOAL OF 2.0-3.0: ICD-10-CM

## 2020-08-17 LAB
CAPILLARY BLOOD COLLECTION: NORMAL
INR PPP: 2.8 (ref 0.86–1.14)

## 2020-08-17 PROCEDURE — 36416 COLLJ CAPILLARY BLOOD SPEC: CPT | Performed by: PHYSICIAN ASSISTANT

## 2020-08-17 PROCEDURE — 85610 PROTHROMBIN TIME: CPT | Performed by: PHYSICIAN ASSISTANT

## 2020-08-17 PROCEDURE — 99207 ZZC NO CHARGE NURSE ONLY: CPT | Performed by: INTERNAL MEDICINE

## 2020-08-17 NOTE — TELEPHONE ENCOUNTER
Pt referred to [facility] via fax by Dr. Fisher from [place] [facility] of neurolog[facility] for DVT and ACC management (patient is on Coumadin and needs to be switched to Eliquis per referring provider notes.)     Patient has RLE venous US in [facility] on 8/4/20 and 8/14/20.    Pt needs to be scheduled for in-person consult with vascular medicine (referral note specifically requesting Dr. Boston) Due to availability and this referral being labeled as urgent by referring provider please schedule with whomever is available for medicine in the next few days. Please discuss with management where to put this patient due to no availability.     Dianne BURGESSN, RN    [facility]  Office: [phone]  Fax: [phone]

## 2020-08-17 NOTE — TELEPHONE ENCOUNTER
August 17, 2020    Patient is scheduled for a new patient consult appointment on 8/18/2020 with Dr. Nieto at [facility] (time per [facility]).    Ana M Persaud    [facility]  Office: [phone]  Fax [phone]

## 2020-08-17 NOTE — PROGRESS NOTES
ANTICOAGULATION FOLLOW-UP CLINIC VISIT    Patient Name:  Good Petty  Date:  2020  Contact Type:  Telephone/ Called patient, he reports possibly changing to Eliquis, will know this week. Orders discussed with patient, he argrees with the plan. Lab INR appointment scheduled on 20, patient will cancel if he changes medication.     SUBJECTIVE:  Patient Findings     Positives:   Emergency department visit (Patient was at ED 20, with increased leg pain. )    Comments:   The patient was assessed for diet, medication, and activity level changes, missed or extra doses, bruising or bleeding, with no problem findings. Maintenance warfarin dosing was reviewed with patient and will remain on the same dose until next INR check. Patient did not have any questions or concerns. Per patient, his Neurologist does not want him taking warfarin, wants patient changed to Eliquis, patient has an appointment with Vascular 20, Hematology on 20. Patient will follow up with INR clinic after appointments if warfarin is stopped.         Clinical Outcomes     Comments:   The patient was assessed for diet, medication, and activity level changes, missed or extra doses, bruising or bleeding, with no problem findings. Maintenance warfarin dosing was reviewed with patient and will remain on the same dose until next INR check. Patient did not have any questions or concerns. Per patient, his Neurologist does not want him taking warfarin, wants patient changed to Eliquis, patient has an appointment with Vascular 20, Hematology on 20. Patient will follow up with INR clinic after appointments if warfarin is stopped.            OBJECTIVE    Recent labs: (last 7 days)     20  1141   INR 2.80*       ASSESSMENT / PLAN  INR assessment THER    Recheck INR In: 4 DAYS    INR Location Outside lab      Anticoagulation Summary  As of 2020    INR goal:   2.0-3.0   TTR:   100.0 % (2 d)   INR used for dosin.80  (8/17/2020)   Warfarin maintenance plan:   5 mg (5 mg x 1) every day   Full warfarin instructions:   5 mg every day   Weekly warfarin total:   35 mg   No change documented:   Clare Hodges RN   Plan last modified:   Francesca Hines RN (8/13/2020)   Next INR check:   8/21/2020   Priority:   High   Target end date:   3/5/2021    Indications    Acute deep vein thrombosis (DVT) of distal vein of right lower extremity (H) [I82.4Z1]             Anticoagulation Episode Summary     INR check location:       Preferred lab:       Send INR reminders to:   [facility]    Comments:   hx of intercerebral hemorrhage, keep INR between 2.0-2.5.      Anticoagulation Care Providers     Provider Role Specialty Phone number    Radha Trotter MD Referring Internal Medicine [phone]            See the Encounter Report to view Anticoagulation Flowsheet and Dosing Calendar (Go to Encounters tab in chart review, and find the Anticoagulation Therapy Visit)    Dosage adjustment made based on physician directed care plan.    Clare Hodges, RN

## 2020-08-18 ENCOUNTER — HOSPITAL ENCOUNTER (OUTPATIENT)
Dept: LAB | Facility: CLINIC | Age: 55
End: 2020-08-18
Attending: INTERNAL MEDICINE
Payer: COMMERCIAL

## 2020-08-18 ENCOUNTER — OFFICE VISIT (OUTPATIENT)
Dept: OTHER | Facility: CLINIC | Age: 55
End: 2020-08-18
Attending: INTERNAL MEDICINE
Payer: COMMERCIAL

## 2020-08-18 ENCOUNTER — TELEPHONE (OUTPATIENT)
Dept: ANTICOAGULATION | Facility: CLINIC | Age: 55
End: 2020-08-18

## 2020-08-18 VITALS
HEIGHT: 71 IN | SYSTOLIC BLOOD PRESSURE: 121 MMHG | RESPIRATION RATE: 16 BRPM | OXYGEN SATURATION: 99 % | WEIGHT: 175 LBS | HEART RATE: 72 BPM | DIASTOLIC BLOOD PRESSURE: 76 MMHG | BODY MASS INDEX: 24.5 KG/M2

## 2020-08-18 DIAGNOSIS — I82.4Z1 ACUTE EMBOLISM AND THROMBOSIS OF DEEP VEIN OF RIGHT DISTAL LOWER EXTREMITY (H): ICD-10-CM

## 2020-08-18 DIAGNOSIS — I82.4Z1 ACUTE DEEP VEIN THROMBOSIS (DVT) OF DISTAL VEIN OF RIGHT LOWER EXTREMITY (H): ICD-10-CM

## 2020-08-18 DIAGNOSIS — I82.4Z1 DEEP VEIN THROMBOSIS (DVT) OF DISTAL VEIN OF RIGHT LOWER EXTREMITY, UNSPECIFIED CHRONICITY (H): Primary | ICD-10-CM

## 2020-08-18 DIAGNOSIS — I82.4Z1 ACUTE DEEP VEIN THROMBOSIS (DVT) OF DISTAL VEIN OF RIGHT LOWER EXTREMITY (H): Primary | ICD-10-CM

## 2020-08-18 LAB — D DIMER PPP FEU-MCNC: 0.7 UG/ML FEU (ref 0–0.5)

## 2020-08-18 PROCEDURE — 85613 RUSSELL VIPER VENOM DILUTED: CPT | Performed by: INTERNAL MEDICINE

## 2020-08-18 PROCEDURE — 85597 PHOSPHOLIPID PLTLT NEUTRALIZ: CPT | Performed by: INTERNAL MEDICINE

## 2020-08-18 PROCEDURE — G0463 HOSPITAL OUTPT CLINIC VISIT: HCPCS

## 2020-08-18 PROCEDURE — 81241 F5 GENE: CPT | Performed by: INTERNAL MEDICINE

## 2020-08-18 PROCEDURE — 81240 F2 GENE: CPT | Performed by: INTERNAL MEDICINE

## 2020-08-18 PROCEDURE — 99214 OFFICE O/P EST MOD 30 MIN: CPT | Mod: ZP | Performed by: INTERNAL MEDICINE

## 2020-08-18 PROCEDURE — 85730 THROMBOPLASTIN TIME PARTIAL: CPT | Performed by: INTERNAL MEDICINE

## 2020-08-18 PROCEDURE — 86147 CARDIOLIPIN ANTIBODY EA IG: CPT | Performed by: INTERNAL MEDICINE

## 2020-08-18 PROCEDURE — 86146 BETA-2 GLYCOPROTEIN ANTIBODY: CPT | Performed by: INTERNAL MEDICINE

## 2020-08-18 PROCEDURE — 85379 FIBRIN DEGRADATION QUANT: CPT | Performed by: INTERNAL MEDICINE

## 2020-08-18 PROCEDURE — 00000401 ZZHCL STATISTIC THROMBIN TIME NC: Performed by: INTERNAL MEDICINE

## 2020-08-18 PROCEDURE — 85732 THROMBOPLASTIN TIME PARTIAL: CPT | Performed by: INTERNAL MEDICINE

## 2020-08-18 PROCEDURE — 36415 COLL VENOUS BLD VENIPUNCTURE: CPT | Performed by: INTERNAL MEDICINE

## 2020-08-18 PROCEDURE — 00000167 ZZHCL STATISTIC INR NC: Performed by: INTERNAL MEDICINE

## 2020-08-18 ASSESSMENT — MIFFLIN-ST. JEOR: SCORE: 1645.92

## 2020-08-18 NOTE — TELEPHONE ENCOUNTER
Patient called to report he was seen by vascular today and was changed from warfarin to Xarelto.    ANTICOAGULATION  MANAGEMENT    Good Petty is being discharged from the [facility] ([facility]).    Reason for discharge: warfarin replaced by alternate therapy, Xarelto    Anticoagulation episode resolved, ACC referral closed and INR Standing order discontinued    If patient needs warfarin management in the future, please send a new referral  Clare Hodges RN

## 2020-08-18 NOTE — PROGRESS NOTES
"Good Petty is a 55 year old male who presents for:  Chief Complaint   Patient presents with     RECHECK     [facility]08/17/[facility]; Pt referred to [facility] via fax by Dr. Fisher from [place] [facility] of neurology for DVT and ACC management. RLE venous US in [facility] on 8/4/20 and 8/14/20. *Time per [facility]* NV        Vitals:    Vitals:    08/18/20 1144 08/18/20 1145   BP: 111/75 121/76   BP Location: Right arm Left arm   Patient Position: Chair Chair   Cuff Size: Adult Regular Adult Regular   Pulse: 72    Resp: 16    SpO2: 99%    Weight: 175 lb (79.4 kg)    Height: 5' 11\" (1.803 m)        BMI:  Estimated body mass index is 24.41 kg/m  as calculated from the following:    Height as of this encounter: 5' 11\" (1.803 m).    Weight as of this encounter: 175 lb (79.4 kg).    Pain Score:  Data Unavailable        Ruth Bowles [facility]    "

## 2020-08-18 NOTE — PROGRESS NOTES
Vascular Medicine Progress Note     Good Petty is a 55 year old male who is here for unprovoked right lower extremity below-knee DVT    Interval History     Patient was recently diagnosed with unprovoked right lower extremity DVT, patient denies any history of trauma, prolonged travel, hormonal treatment, no family or personal history of blood clotting in the past, yet the patient was recently diagnosed with CVA with minor residual changes.  Patient was started on Coumadin and the consensus was to start him on NOACs,  Patient had no work-up for hypercoagulable states he is already on Coumadin so some tests can be initiated apart from protein C and protein S for now  Physical Exam       BP: 121/76 Pulse: 72   Resp: 16 SpO2: 99 %      Vitals:    08/18/20 1144   Weight: 79.4 kg (175 lb)     Vital Signs with Ranges  Pulse:  [72] 72  Resp:  [16] 16  BP: (111-121)/(75-76) 121/76  SpO2:  [99 %] 99 %  [unfilled]    Constitutional: awake, alert, cooperative, no apparent distress, and appears stated age  Eyes: Lids and lashes normal, pupils equal, round and reactive to light, extra ocular muscles intact, sclera clear, conjunctiva normal  ENT: normocepalic, without obvious abnormality, oropharynx pink and moist  Hematologic / Lymphatic: no lymphadenopathy  Respiratory: No increased work of breathing, good air exchange, clear to auscultation bilaterally, no crackles or wheezing  Cardiovascular: regular rate and rhythm, normal S1 and S2 and no murmur noted  GI: Normal bowel sounds, soft, non-distended, non-tender  Skin: no redness, warmth, or swelling, no rashes and no lesions  Musculoskeletal: There is no redness, warmth, or swelling of the joints.  Full range of motion noted.  Motor strength is 5 out of 5 all extremities bilaterally.  Tone is normal.  Neurologic: Awake, alert, oriented to name, place and time.  Cranial nerves II-XII are grossly intact.  Motor is 5 out of 5 bilaterally.    Neuropsychiatric:   Normal affect, memory, insight.  Pulses: Palpable bilateral and equal. No carotid bruits appreciated.     Medications         Data   No results found for this or any previous visit (from the past 24 hour(s)).    Assessment & Plan   (I82.4Z1) Acute deep vein thrombosis (DVT) of distal vein of right lower extremity (H)  (primary encounter diagnosis)  Comment: Non-provoked right lower extremity DVT with possibility of hypercoagulable state will check d-dimer and other testing as indicated below  Plan: D dimer quantitative, D dimer quantitative, US         Lower Extremity Venous Duplex Bilateral,         rivaroxaban ANTICOAGULANT (XARELTO         ANTICOAGULANT) 15 MG TABS tablet, rivaroxaban         ANTICOAGULANT (XARELTO ANTICOAGULANT) 20 MG         TABS tablet, F2 prothrombin 25140M Mut Anal,         Lupus Anticoagulant Panel, Cardiolipin Citlalli IgG         and IgM, Beta 2 Glycoprotein Antibodies IGG         IGM, Factor 5 leiden mutation analysis                Summary: Follow-up with the patient in 3 months after being on Xarelto as indicated  We will check d-dimer in 3 months in addition to Doppler venous ultrasound bilateral lower extremities    Av Nieto MD

## 2020-08-19 LAB
B2 GLYCOPROT1 IGG SERPL IA-ACNC: 0.6 U/ML
B2 GLYCOPROT1 IGM SERPL IA-ACNC: <2.9 U/ML
CARDIOLIPIN ANTIBODY IGG: <1.6 GPL-U/ML (ref 0–19.9)
CARDIOLIPIN ANTIBODY IGM: 1.8 MPL-U/ML (ref 0–19.9)

## 2020-08-20 LAB — COPATH REPORT: NORMAL

## 2020-08-21 LAB — LA PPP-IMP: NEGATIVE

## 2020-10-26 ENCOUNTER — DOCUMENTATION ONLY (OUTPATIENT)
Dept: LAB | Facility: CLINIC | Age: 55
End: 2020-10-26

## 2020-10-26 DIAGNOSIS — I82.4Z1 ACUTE DEEP VEIN THROMBOSIS (DVT) OF DISTAL VEIN OF RIGHT LOWER EXTREMITY (H): ICD-10-CM

## 2020-10-26 NOTE — PROGRESS NOTES
Good has a lab only appointment on 11/3/2020. His appointment note states labs for Dr Boston. Please place future lab orders. Thanks, Nunu

## 2020-10-28 NOTE — PROGRESS NOTES
Patient requested to stay in [place]. Dr. Nieto no longer visits [place].      Care scheduled with Dr. Boston.     Vicky CAGE

## 2020-10-28 NOTE — TELEPHONE ENCOUNTER
Routing refill request to provider for review/approval because:  Drug not active on patient's medication list - discontinued

## 2020-10-28 NOTE — PROGRESS NOTES
This patient is scheduled for new consult with Dr. Boston on 12/15/20 scheduled by Vicky CAGE  Patient is patient of Dr. Nieto and was to follow up in 3 months form August 2020 visit with labs and bilateral US.  There is no note as to why patient was scheduled with Dr. Boston. Please cancel this and schedule follow up with Dr. Nieto.     Dianne BURGESSN, RN    [facility]  Vascular [facility]  Office: [phone]  Fax: [phone]

## 2020-10-29 RX ORDER — WARFARIN SODIUM 5 MG/1
TABLET ORAL
Qty: 90 TABLET | Refills: 0 | OUTPATIENT
Start: 2020-10-29

## 2020-10-29 NOTE — TELEPHONE ENCOUNTER
This message left on [facility]'s machine as they are closed at this time.  Patient's home/cell number message on machine to call back.

## 2020-10-29 NOTE — TELEPHONE ENCOUNTER
I am not sure why there is request for warfarin as pt is on Xarelto . And pt sees vascular clinic for xarelto

## 2020-10-29 NOTE — TELEPHONE ENCOUNTER
Patient called back, he is not taking warfarin and has switched to Xarelto. Patient called the pharmacy to clarify. disregard refill request

## 2020-11-02 ENCOUNTER — HOSPITAL ENCOUNTER (OUTPATIENT)
Dept: ULTRASOUND IMAGING | Facility: CLINIC | Age: 55
Discharge: HOME | End: 2020-11-02
Attending: INTERNAL MEDICINE | Admitting: INTERNAL MEDICINE
Payer: COMMERCIAL

## 2020-11-02 DIAGNOSIS — I82.4Z1 ACUTE DEEP VEIN THROMBOSIS (DVT) OF DISTAL VEIN OF RIGHT LOWER EXTREMITY (H): ICD-10-CM

## 2020-11-02 PROCEDURE — 93970 EXTREMITY STUDY: CPT

## 2020-11-03 DIAGNOSIS — S06.0X0A CONCUSSION WITH NO LOSS OF CONSCIOUSNESS: ICD-10-CM

## 2020-11-03 DIAGNOSIS — I10 ESSENTIAL HYPERTENSION, BENIGN: ICD-10-CM

## 2020-11-03 DIAGNOSIS — S06.360A: ICD-10-CM

## 2020-11-03 DIAGNOSIS — E56.9 VITAMIN DEFICIENCY: ICD-10-CM

## 2020-11-03 DIAGNOSIS — I10 ESSENTIAL HYPERTENSION, MALIGNANT: ICD-10-CM

## 2020-11-03 DIAGNOSIS — E78.2 MIXED HYPERLIPIDEMIA: Primary | ICD-10-CM

## 2020-11-03 LAB
CRP SERPL HS-MCNC: 0.5 MG/L
INSULIN SERPL-ACNC: 11.4 MU/L (ref 3–25)
VIT B12 SERPL-MCNC: 766 PG/ML (ref 193–986)

## 2020-11-03 PROCEDURE — 80061 LIPID PANEL: CPT | Mod: 90 | Performed by: PSYCHIATRY & NEUROLOGY

## 2020-11-03 PROCEDURE — 83735 ASSAY OF MAGNESIUM: CPT | Mod: 90 | Performed by: PSYCHIATRY & NEUROLOGY

## 2020-11-03 PROCEDURE — 82607 VITAMIN B-12: CPT | Performed by: PSYCHIATRY & NEUROLOGY

## 2020-11-03 PROCEDURE — 86141 C-REACTIVE PROTEIN HS: CPT | Performed by: PSYCHIATRY & NEUROLOGY

## 2020-11-03 PROCEDURE — 82306 VITAMIN D 25 HYDROXY: CPT | Performed by: PSYCHIATRY & NEUROLOGY

## 2020-11-03 PROCEDURE — 83921 ORGANIC ACID SINGLE QUANT: CPT | Performed by: PSYCHIATRY & NEUROLOGY

## 2020-11-03 PROCEDURE — 36415 COLL VENOUS BLD VENIPUNCTURE: CPT | Performed by: PSYCHIATRY & NEUROLOGY

## 2020-11-03 PROCEDURE — 83525 ASSAY OF INSULIN: CPT | Performed by: PSYCHIATRY & NEUROLOGY

## 2020-11-03 PROCEDURE — 83695 ASSAY OF LIPOPROTEIN(A): CPT | Mod: 90 | Performed by: PSYCHIATRY & NEUROLOGY

## 2020-11-03 PROCEDURE — 99000 SPECIMEN HANDLING OFFICE-LAB: CPT | Performed by: PSYCHIATRY & NEUROLOGY

## 2020-11-03 PROCEDURE — 83704 LIPOPROTEIN BLD QUAN PART: CPT | Mod: 90 | Performed by: PSYCHIATRY & NEUROLOGY

## 2020-11-03 PROCEDURE — 83090 ASSAY OF HOMOCYSTEINE: CPT | Performed by: PSYCHIATRY & NEUROLOGY

## 2020-11-04 LAB
HCYS SERPL-SCNC: 8.1 UMOL/L (ref 4–12)
LPA SERPL-MCNC: 73 MG/DL

## 2020-11-05 LAB — METHYLMALONATE SERPL-SCNC: 0.22 UMOL/L (ref 0–0.4)

## 2020-11-06 LAB
CHOLEST SERPL-MCNC: 139 MG/DL
DEPRECATED CALCIDIOL+CALCIFEROL SERPL-MC: <29 UG/L (ref 20–75)
HDL SERPL QN: 9.5 NM
HDL SERPL-SCNC: 30.4 UMOL/L
HDLC SERPL-MCNC: 55 MG/DL (ref 40–59)
HLD.LARGE SERPL-SCNC: 7.9 UMOL/L
LDL SERPL QN: 21.9 NM
LDL SERPL-SCNC: 520 NMOL/L
LDL SMALL SERPL-SCNC: <165 NMOL/L
LDLC SERPL CALC-MCNC: 68 MG/DL
PATHOLOGY STUDY: ABNORMAL
TRIGL SERPL-MCNC: 80 MG/DL (ref 30–149)
VITAMIN D2 SERPL-MCNC: <5 UG/L
VITAMIN D3 SERPL-MCNC: 24 UG/L
VLDL LARGE SERPL-SCNC: 2.8 NMOL/L
VLDL SERPL QN: 52 NM

## 2020-11-07 LAB — MAGNESIUM RBC-SCNC: 2.1 MMOL/L (ref 1.5–3.1)

## 2020-11-25 ENCOUNTER — TELEPHONE (OUTPATIENT)
Dept: OTHER | Facility: CLINIC | Age: 55
End: 2020-11-25

## 2020-11-25 NOTE — TELEPHONE ENCOUNTER
Patient is scheduled with Dr. Boston on 12/15/20.    He is a former patient of Dr. Nieto.  The follow up was supposed to include a d-dimer and imaging.    The imaging was done; the d-dimer was not drawn because the expected date was 11/18 and the patient had his labs drawn for another provider on 12/2.    Please contact patient to have d-dimer drawn at least two days prior to 12/15 with Dr. Boston.    Thank you.

## 2020-11-25 NOTE — PROGRESS NOTES
*FORMER [facility] PT*  Patient wants to stay in [place]. 3 month in-person follow up to 8/18/20.  US completed 11/2/2020.  Lab completed 11/3/2020

## 2020-11-27 DIAGNOSIS — I82.4Z1 DEEP VEIN THROMBOSIS (DVT) OF DISTAL VEIN OF RIGHT LOWER EXTREMITY, UNSPECIFIED CHRONICITY (H): ICD-10-CM

## 2020-11-27 LAB — D DIMER PPP FEU-MCNC: <0.3 UG/ML FEU (ref 0–0.5)

## 2020-11-27 PROCEDURE — 36415 COLL VENOUS BLD VENIPUNCTURE: CPT | Performed by: INTERNAL MEDICINE

## 2020-11-27 PROCEDURE — 85379 FIBRIN DEGRADATION QUANT: CPT | Performed by: INTERNAL MEDICINE

## 2020-11-30 NOTE — TELEPHONE ENCOUNTER
D-dimer results complete.  Dasia Rivera RN BSN  [facility] Vascular [facility]  [phone]

## 2020-12-01 ENCOUNTER — HOSPITAL ENCOUNTER (EMERGENCY)
Facility: CLINIC | Age: 55
Discharge: HOME | End: 2020-12-01
Attending: EMERGENCY MEDICINE | Admitting: EMERGENCY MEDICINE
Payer: COMMERCIAL

## 2020-12-01 VITALS
TEMPERATURE: 98.2 F | BODY MASS INDEX: 25.1 KG/M2 | RESPIRATION RATE: 18 BRPM | HEART RATE: 70 BPM | WEIGHT: 180 LBS | OXYGEN SATURATION: 100 % | DIASTOLIC BLOOD PRESSURE: 83 MMHG | SYSTOLIC BLOOD PRESSURE: 119 MMHG

## 2020-12-01 DIAGNOSIS — M54.50 ACUTE MIDLINE LOW BACK PAIN WITHOUT SCIATICA: ICD-10-CM

## 2020-12-01 PROCEDURE — 96375 TX/PRO/DX INJ NEW DRUG ADDON: CPT

## 2020-12-01 PROCEDURE — 96361 HYDRATE IV INFUSION ADD-ON: CPT

## 2020-12-01 PROCEDURE — 96374 THER/PROPH/DIAG INJ IV PUSH: CPT

## 2020-12-01 PROCEDURE — 250N000013 HC RX MED GY IP 250 OP 250 PS 637: Performed by: EMERGENCY MEDICINE

## 2020-12-01 PROCEDURE — 258N000003 HC RX IP 258 OP 636: Performed by: EMERGENCY MEDICINE

## 2020-12-01 PROCEDURE — 99285 EMERGENCY DEPT VISIT HI MDM: CPT | Mod: 25

## 2020-12-01 PROCEDURE — 250N000011 HC RX IP 250 OP 636: Performed by: EMERGENCY MEDICINE

## 2020-12-01 RX ORDER — TIZANIDINE 2 MG/1
2 TABLET ORAL 3 TIMES DAILY
Qty: 12 TABLET | Refills: 0 | Status: SHIPPED | OUTPATIENT
Start: 2020-12-01 | End: 2021-01-29

## 2020-12-01 RX ORDER — OXYCODONE AND ACETAMINOPHEN 5; 325 MG/1; MG/1
1-2 TABLET ORAL EVERY 4 HOURS PRN
Qty: 12 TABLET | Refills: 0 | Status: SHIPPED | OUTPATIENT
Start: 2020-12-01 | End: 2021-01-29

## 2020-12-01 RX ORDER — TIZANIDINE 2 MG/1
2 TABLET ORAL ONCE
Status: COMPLETED | OUTPATIENT
Start: 2020-12-01 | End: 2020-12-01

## 2020-12-01 RX ORDER — ONDANSETRON 2 MG/ML
4 INJECTION INTRAMUSCULAR; INTRAVENOUS EVERY 30 MIN PRN
Status: DISCONTINUED | OUTPATIENT
Start: 2020-12-01 | End: 2020-12-01 | Stop reason: HOSPADM

## 2020-12-01 RX ORDER — OXYCODONE HYDROCHLORIDE 5 MG/1
10 TABLET ORAL ONCE
Status: COMPLETED | OUTPATIENT
Start: 2020-12-01 | End: 2020-12-01

## 2020-12-01 RX ORDER — METHYLPREDNISOLONE 4 MG
4 TABLET, DOSE PACK ORAL SEE ADMIN INSTRUCTIONS
Qty: 21 TABLET | Refills: 0 | Status: SHIPPED | OUTPATIENT
Start: 2020-12-01 | End: 2021-01-29

## 2020-12-01 RX ORDER — HYDROMORPHONE HYDROCHLORIDE 1 MG/ML
0.5 INJECTION, SOLUTION INTRAMUSCULAR; INTRAVENOUS; SUBCUTANEOUS
Status: DISCONTINUED | OUTPATIENT
Start: 2020-12-01 | End: 2020-12-01 | Stop reason: HOSPADM

## 2020-12-01 RX ORDER — ACETAMINOPHEN 325 MG/1
975 TABLET ORAL ONCE
Status: COMPLETED | OUTPATIENT
Start: 2020-12-01 | End: 2020-12-01

## 2020-12-01 RX ORDER — SODIUM CHLORIDE 9 MG/ML
INJECTION, SOLUTION INTRAVENOUS CONTINUOUS
Status: DISCONTINUED | OUTPATIENT
Start: 2020-12-01 | End: 2020-12-01 | Stop reason: HOSPADM

## 2020-12-01 RX ADMIN — OXYCODONE HYDROCHLORIDE 10 MG: 5 TABLET ORAL at 15:37

## 2020-12-01 RX ADMIN — SODIUM CHLORIDE 1000 ML: 9 INJECTION, SOLUTION INTRAVENOUS at 17:32

## 2020-12-01 RX ADMIN — HYDROMORPHONE HYDROCHLORIDE 0.5 MG: 1 INJECTION, SOLUTION INTRAMUSCULAR; INTRAVENOUS; SUBCUTANEOUS at 16:37

## 2020-12-01 RX ADMIN — ACETAMINOPHEN 975 MG: 325 TABLET, FILM COATED ORAL at 14:25

## 2020-12-01 RX ADMIN — TIZANIDINE 2 MG: 2 TABLET ORAL at 15:22

## 2020-12-01 RX ADMIN — ONDANSETRON 4 MG: 2 INJECTION INTRAMUSCULAR; INTRAVENOUS at 17:32

## 2020-12-01 ASSESSMENT — ENCOUNTER SYMPTOMS
HEMATURIA: 0
BACK PAIN: 1
DIFFICULTY URINATING: 0
DYSURIA: 0
ABDOMINAL PAIN: 0

## 2020-12-01 NOTE — ED AVS SNAPSHOT
[facility] Emergency Dept  [address]  [facility] [address]  Phone: [phone]  Fax: [phone]                                    Good Petty   MRN: [id number]    Department: [facility] Emergency Dept   Date of Visit: 12/1/2020           After Visit Summary Signature Page    I have received my discharge instructions, and my questions have been answered. I have discussed any challenges I see with this plan with the nurse or doctor.    ..........................................................................................................................................  Patient/Patient Representative Signature      ..........................................................................................................................................  Patient Representative Print Name and Relationship to Patient    ..................................................               ................................................  Date                                   Time    ..........................................................................................................................................  Reviewed by Signature/Title    ...................................................              ..............................................  Date                                               Time          22EPIC Rev 08/18

## 2020-12-01 NOTE — ED PROVIDER NOTES
History     Chief Complaint:  Back Pain    HPI   Good Petty is a 55 year old male anticoagulated on Xarelto with a history of hypertension, hyperlipidemia, and DVT who presents with back pain. The patient has been having mild intermittent back pain, which is notices particularily when he drives for a long time while working as a [employer] . Tonight after the patient had stood up to wash the dishes he had sudden increase of his back pain from a 1/10 to 10/10 while standing at the sink. He slowly lowered himself to the ground but did not fall. He was given fentanyl en route by EMS which helped his pain, that he now rates a 3-4/10. The pain does not shoot down his legs. He had a stroke a year ago and has not fully recovered function on his right leg. He has not have trouble emptying his bladder her in the ER, there is no new numbness/weakness to BLE (has baseline sensory and mild weakness in RLE from prior stroke), and there was no injury to his back. There is no history of back surgeries. He does not take steroids or use IV drugs. He has never had cancer. He denies dysuria, hematuria, chest pain, or abdominal pain.     Allergies:  No Known Drug Allergies      Medications:    Amlodipine  Xarelto  Zocor    Past Medical History:    Anxiety  Prediabetes  Stroke  Hypertension   Hyperlipidemia   DVT    Past Surgical History:    Tooth extraction  Vasectomy    Family History:    History reviewed. No pertinent family history.      Social History:  Smoking status: Never  Alcohol use: No  Drug use: No  Patient presents alone.  PCP: Radha Trotter    Marital Status:        Review of Systems   Cardiovascular: Negative for chest pain.   Gastrointestinal: Negative for abdominal pain.   Genitourinary: Negative for difficulty urinating, dysuria and hematuria.   Musculoskeletal: Positive for back pain.   All other systems reviewed and are negative.    Physical Exam     Patient Vitals for the past 24 hrs:   BP  Temp Temp src Pulse Resp SpO2 Weight   12/01/20 1745 120/87 -- -- 52 -- 97 % --   12/01/20 1730 (!) 134/90 -- -- 54 -- 98 % --   12/01/20 1715 139/84 -- -- 56 -- 99 % --   12/01/20 1700 126/86 -- -- 64 -- 99 % --   12/01/20 1645 115/88 -- -- 69 -- 100 % --   12/01/20 1600 120/82 -- -- 56 18 98 % --   12/01/20 1500 136/81 -- -- 54 -- -- --   12/01/20 1430 (!) 141/79 -- -- 54 -- -- --   12/01/20 1400 137/86 -- -- 52 -- -- --   12/01/20 1345 (!) 142/92 98.2  F (36.8  C) Oral 56 18 98 % 81.6 kg (180 lb)      Physical Exam  VS: Reviewed per above  HENT: Mucous membranes moist, no nuchal rigidity  EYES: sclera anicteric  CV: Rate as noted, regular rhythm.   RESP: Effort normal. Breath sounds are normal bilaterally.  GI: no tenderness/rebound/guarding, not distended.  NEURO: GCS 15, cranial nerves II through XII are intact, 5 out of 5 strength in all 4 extremities, sensation is intact light touch in all 4 extremities (aside from baseline paresthesias in RLE)  MSK: No deformity of the extremities  SKIN: Warm and dry      Emergency Department Course   Interventions:  1425 Tylenol 975 mg tablet PO  1522 Tizanidine 2 mg tablet PO  1537 Oxycodone 10 mg tablet PO   Dilaudid 0.5 mg IV     Emergency Department Course:  1405 Nursing notes and vitals reviewed. I performed an exam of the patient as documented above.     Medicine administered as documented above.    1524 I rechecked the patient and discussed the results of his workup thus far.     Findings and plan explained to the Patient. Patient discharged home with instructions regarding supportive care, medications, and reasons to return. The importance of close follow-up was reviewed.     Impression & Plan      Medical Decision Making:  Good Petty presented with back pain.  The pain has improved with interventions in the emergency department. Pt did not sustain any focal trauma, therefore x-rays/CT are not necessary due to the low likelihood of fracture or subluxation. Pt  has no back pain red flags on history or exam to suggest spinal cord compression syndrome (cauda equina syndrome, spinal/epidural space hematoma), spinal column infection (epidural abscess, discitis, osteomyelitis), nor malignancy/metastatic disease.     The neurological exam is without new change in his baseline paresthesias and very subtle weakness in the right lower extremity (old stroke).  His tenderness is right over the mid lumbosacral region.  This could be a musculoskeletal strain or irritated nerve root.  At signout to my colleague, patient was feeling a bit lightheaded after pain medications but his pain had improved.  Plan for road test after Zofran and fluids.  If feeling well enough for discharge, plan to give him medrol dose pack, tizanidine and Percocet.  I recommended he follow closely with his primary care doctor and avoid bending/heavy lifting.      Diagnosis:    ICD-10-CM    1. Acute midline low back pain without sciatica  M54.5        Disposition:  Signed out to Dr reyez    Discharge Medications:  New Prescriptions    METHYLPREDNISOLONE (MEDROL DOSEPAK) 4 MG TABLET THERAPY PACK    Take 1 tablet (4 mg) by mouth See Admin Instructions    OXYCODONE-ACETAMINOPHEN (PERCOCET) 5-325 MG TABLET    Take 1-2 tablets by mouth every 4 hours as needed for severe pain    TIZANIDINE (ZANAFLEX) 2 MG TABLET    Take 1 tablet (2 mg) by mouth 3 times daily       Melinda Acevedo  12/1/2020   [facility] EMERGENCY DEPT    Scribe Disclosure:  I, Melinda Acevedo, am serving as a scribe at 2:05 PM on 12/1/2020 to document services personally performed by Demetrius Grant MD based on my observations and the provider's statements to me.          Demetrius Grant MD  12/01/20 4082

## 2020-12-01 NOTE — ED TRIAGE NOTES
Sudden onset lumbar back pain without hx or fall. 17  PIV with 100mcg fentanyl given PTA. Denies numbness or tingling. Denies hx of back pain or urinary sx. ABC in tact. A/OX4

## 2020-12-02 NOTE — DISCHARGE INSTRUCTIONS
Take Medrol Dosepak regardless this will help with inflammation.    May use Tylenol as needed but be careful using this with Percocet.  Given that you are on blood thinners you should avoid Motrin or Advil.    Muscle relaxer tizanidine will help if you have muscle spasm.  Percocet will help if you are having extreme pain.    Do not drive with tizanidine or Percocet.      Discharge Instructions  Back Pain  You were seen today for back pain. Back pain can have many causes, but most will get better without surgery or other specific treatment. Sometimes there is a herniated ( slipped ) disc. We do not usually do MRI scans to look for these right away, since most herniated discs will get better on their own with time.  Today, we did not find any evidence that your back pain was caused by a serious condition. However, sometimes symptoms develop over time and cannot be found during an emergency visit, so it is very important that you follow up with your primary provider.  Generally, every Emergency Department visit should have a follow-up clinic visit with either a primary or a specialty clinic/provider. Please follow-up as instructed by your emergency provider today.    Return to the Emergency Department if:  You develop a fever with your back pain.   You have weakness or change in sensation in one or both legs.  You lose control of your bowels or bladder, or cannot empty your bladder (cannot pee).  Your pain gets much worse.     Follow-up with your provider:  Unless your pain has completely gone away, please make an appointment with your provider within one week. Most of the routine care for back pain is available in a clinic and not the Emergency Department. You may need further management of your back pain, such as more pain medication, imaging such as an X-ray or MRI, or physical therapy.    What can I do to help myself?  Remain Active -- People are often afraid that they will hurt their back further or delay recovery  by remaining active, but this is one of the best things you can do for your back. In fact, staying in bed for a long time to rest is not recommended. Studies have shown that people with low back pain recover faster when they remain active. Movement helps to bring blood flow to the muscles and relieve muscle spasms as well as preventing loss of muscle strength.  Heat -- Using a heating pad can help with low back pain during the first few weeks. Do not sleep with a heating pad, as you can be burned.   Pain medications - You may take a pain medication such as Tylenol  (acetaminophen), Advil , Motrin  (ibuprofen) or Aleve  (naproxen).  If you were given a prescription for medicine here today, be sure to read all of the information (including the package insert) that comes with your prescription.  This will include important information about the medicine, its side effects, and any warnings that you need to know about.  The pharmacist who fills the prescription can provide more information and answer questions you may have about the medicine.  If you have questions or concerns that the pharmacist cannot address, please call or return to the Emergency Department.   Remember that you can always come back to the Emergency Department if you are not able to see your regular provider in the amount of time listed above, if you get any new symptoms, or if there is anything that worries you.

## 2020-12-02 NOTE — ED NOTES
I received sign out from Dr. Grant.  Please refer to their H&P for further information.  In brief, patient is a [place] .  After a long drive he noted SI discomfort.  No fall.  Was given medications but felt woozy.  Patient was signed out pending road test.  Patient ambulated without assistance.  He was able to walk to the door and get himself out of bed.  He felt comfortable going home.  I discussed pain medications and close follow-up.  Patient felt comfortable this plan.  Return precautions were discussed and patient was discharged home.       Karin Menon MD  12/01/20 2816

## 2020-12-04 ENCOUNTER — TELEPHONE (OUTPATIENT)
Dept: INTERNAL MEDICINE | Facility: CLINIC | Age: 55
End: 2020-12-04

## 2020-12-04 DIAGNOSIS — M54.50 ACUTE MIDLINE LOW BACK PAIN WITHOUT SCIATICA: Primary | ICD-10-CM

## 2020-12-04 NOTE — TELEPHONE ENCOUNTER
Call to patient per Dr. Trotter's request. Patient was seen in ED on 12/1 for back pain and has hospital follow up appointment scheduled 12/8. Per Dr. Trotter patient should be seen by orthopedics. Dr. Trotter is able to make referral if patient would like.    Left message for patient to call back.

## 2020-12-08 ENCOUNTER — ANCILLARY PROCEDURE (OUTPATIENT)
Dept: GENERAL RADIOLOGY | Facility: CLINIC | Age: 55
End: 2020-12-08
Attending: FAMILY MEDICINE
Payer: COMMERCIAL

## 2020-12-08 ENCOUNTER — OFFICE VISIT (OUTPATIENT)
Dept: ORTHOPEDICS | Facility: CLINIC | Age: 55
End: 2020-12-08
Attending: INTERNAL MEDICINE
Payer: COMMERCIAL

## 2020-12-08 VITALS
SYSTOLIC BLOOD PRESSURE: 118 MMHG | DIASTOLIC BLOOD PRESSURE: 60 MMHG | BODY MASS INDEX: 25.2 KG/M2 | HEIGHT: 71 IN | WEIGHT: 180 LBS

## 2020-12-08 DIAGNOSIS — M54.50 ACUTE MIDLINE LOW BACK PAIN WITHOUT SCIATICA: ICD-10-CM

## 2020-12-08 DIAGNOSIS — S39.012A STRAIN OF LUMBAR REGION, INITIAL ENCOUNTER: Primary | ICD-10-CM

## 2020-12-08 PROCEDURE — 99204 OFFICE O/P NEW MOD 45 MIN: CPT | Performed by: FAMILY MEDICINE

## 2020-12-08 PROCEDURE — 72100 X-RAY EXAM L-S SPINE 2/3 VWS: CPT | Performed by: RADIOLOGY

## 2020-12-08 ASSESSMENT — MIFFLIN-ST. JEOR: SCORE: 1668.6

## 2020-12-08 NOTE — LETTER
12/8/2020         RE: Good Petty  [id number] [facility] [address]        Dear Colleague,    Thank you for referring your patient, Good Petty, to the [facility] [place]. Please see a copy of my visit note below.    ASSESSMENT & PLAN  Patient Instructions     1. Strain of lumbar region, initial encounter    2. Acute midline low back pain without sciatica      -Patient has low back pain due to muscle strain and possible bulging intervertebral disc in the lumbar spine  -Patient will start formal physical therapy and home exercise program  -Patient may take 1 to 2 tablets of exercise Tylenol as needed for pain.  Patient may continue with tizanidine 2 mg as needed.  Patient should avoid all oral anti-inflammatory since he is on Xarelto for a right lower extremity DVT  -Patient will follow up if pain does not improve for reevaluation.  To consider possible MRI lumbar spine if indicated  -Call direct clinic number [[phone]] at any time with questions or concerns.    Albert Yeo MD [facility]  [facility] [place]          -----    SUBJECTIVE  Good Petty is a/an 55 year old male who is seen in consultation at the request of  Radha Trotter M.D. for evaluation of low back pain. The patient is seen by themselves.    Onset: 1 week(s) ago. Patient describes injury as has a history of back pain, but a week ago, stood up to do the dishes and had extreme back pain. Slowly lowered himself to the floor and then his wife called EMS. They took him to the ER because he was unable to get up by himself. States they gave him some pain meds and is feeling better. Also, states he had a stroke back in January, and is now fearing if some of him symptoms are the result of blood clots. States he had an US done on 11/2/2020 and will be following up with them on 12/15/2020.   Location of Pain: generalized low back pain, states does  feel numbness in his back and has right leg weakness that has been getting worse the past week. Also states that his right side was what was effected from the stroke.   Rating of Pain at worst: 10/10  Rating of Pain Currently: 1/10  Worsened by: sitting for long periods, standing for longer than 15 min, exercising,   Better with: mederol dose pack (that he finished yesterday, but states he think he took it wrong) and tizanidine  Treatments tried: ice, heat and other medications: Prednisone (Medrol) and tizanidine; was given oxycodone, but states did not take any of it  Quality: aching, dull  Red flags: Weakness: Yes on the right side (mostly due to past stroke), bowel/bladder loss: No, foot drop: No  Associated symptoms: numbness, weakness of right leg and feeling of instability  Orthopedic history: YES - Date: stroke Jan 11th, 2020  Relevant surgical history: NO  Social history: social history: works at [employer], 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    Past Medical History:   Diagnosis Date     Anxiety      Prediabetes      Shoulder capsulitis, right      Social History     Socioeconomic History     Marital status:      Spouse name: Not on file     Number of children: Not on file     Years of education: Not on file     Highest education level: Not on file   Occupational History     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   Social Needs     Financial resource strain: Not on file     Food insecurity     Worry: Not on file     Inability: Not on file     Transportation needs     Medical: Not on file     Non-medical: Not on file   Tobacco Use     Smoking status: Never Smoker     Smokeless tobacco: Never Used   Substance and Sexual Activity     Alcohol use: Never     Frequency: Never     Drug use: Never     Sexual activity: Yes     Partners: Female   Lifestyle     Physical activity     Days per week: Not on file     Minutes per session: Not on file     Stress: Not on file   Relationships     Social connections      "Talks on phone: Not on file     Gets together: Not on file     Attends Mormonism service: Not on file     Active member of club or organization: Not on file     Attends meetings of clubs or organizations: Not on file     Relationship status: Not on file     Intimate partner violence     Fear of current or ex partner: Not on file     Emotionally abused: Not on file     Physically abused: Not on file     Forced sexual activity: Not on file   Other Topics Concern     Not on file   Social History Narrative     Not on file         Patient's past medical, surgical, social, and family histories were reviewed today and no changes are noted.    REVIEW OF SYSTEMS:  10 point ROS is negative other than symptoms noted above in HPI, Past Medical History or as stated below  Constitutional: NEGATIVE for fever, chills, change in weight  Skin: NEGATIVE for worrisome rashes, moles or lesions  GI/: NEGATIVE for bowel or bladder changes  Neuro: NEGATIVE for weakness, dizziness or paresthesias    OBJECTIVE:  /60   Ht 1.803 m (5' 11\")   Wt 81.6 kg (180 lb)   BMI 25.10 kg/m     General: healthy, alert and in no distress  HEENT: no scleral icterus or conjunctival erythema  Skin: no suspicious lesions or rash. No jaundice.  CV: no pedal edema  Resp: normal respiratory effort without conversational dyspnea   Psych: normal mood and affect  Gait: normal steady gait with appropriate coordination and balance  Neuro: Decreased sensation to light touch of entire right lower extremity.  DTR's 2+ patella and achilles bilaterally.  MSK:  THORACIC/LUMBAR SPINE  Inspection:    No gross deformity/asymmetry  Palpation:    Tender about the lumbar spinous processes. Otherwise remainder of landmarks are nontender.  Range of Motion:     Lumbar flexion limited slightly by pain    Lumbar extension limited slightly by pain  Strength:    Full strength throughout hips/quads/hamstrings  Special Tests:    Positive: straight leg raise (bilateral)    "   Independent visualization of the below image:  Recent Results (from the past 24 hour(s))   XR Lumbar Spine 2/3 Views    Narrative    LUMBAR SPINE TWO TO THREE VIEWS December 8, 2020 1:50 PM     HISTORY: Acute midline low back pain without sciatica.    COMPARISON: None.      Impression    IMPRESSION: Lumbar vertebrae are normally aligned. Mild intervertebral  disc space narrowing at L4-L5 and L5-S1. Remaining disc spaces are  well-maintained. No compression deformity or acute fracture.           Albert Yeo MD [facility]        Again, thank you for allowing me to participate in the care of your patient.        Sincerely,        Albert Yeo, MD

## 2020-12-08 NOTE — PROGRESS NOTES
ASSESSMENT & PLAN  Patient Instructions     1. Strain of lumbar region, initial encounter    2. Acute midline low back pain without sciatica      -Patient has low back pain due to muscle strain and possible bulging intervertebral disc in the lumbar spine  -Patient will start formal physical therapy and home exercise program  -Patient may take 1 to 2 tablets of exercise Tylenol as needed for pain.  Patient may continue with tizanidine 2 mg as needed.  Patient should avoid all oral anti-inflammatory since he is on Xarelto for a right lower extremity DVT  -Patient will follow up if pain does not improve for reevaluation.  To consider possible MRI lumbar spine if indicated  -Call direct clinic number [[phone]] at any time with questions or concerns.    Albert Yeo MD [facility] Orth[facility] and Sports Medicine  [facility]          -----    SUBJECTIVE  Good Petty is a/an 55 year old male who is seen in consultation at the request of  Radha Trotter M.D. for evaluation of low back pain. The patient is seen by themselves.    Onset: 1 week(s) ago. Patient describes injury as has a history of back pain, but a week ago, stood up to do the dishes and had extreme back pain. Slowly lowered himself to the floor and then his wife called EMS. They took him to the ER because he was unable to get up by himself. States they gave him some pain meds and is feeling better. Also, states he had a stroke back in January, and is now fearing if some of him symptoms are the result of blood clots. States he had an US done on 11/2/2020 and will be following up with them on 12/15/2020.   Location of Pain: generalized low back pain, states does feel numbness in his back and has right leg weakness that has been getting worse the past week. Also states that his right side was what was effected from the stroke.   Rating of Pain at worst: 10/10  Rating of Pain Currently: 1/10  Worsened by: sitting for long  periods, standing for longer than 15 min, exercising,   Better with: mederol dose pack (that he finished yesterday, but states he think he took it wrong) and tizanidine  Treatments tried: ice, heat and other medications: Prednisone (Medrol) and tizanidine; was given oxycodone, but states did not take any of it  Quality: aching, dull  Red flags: Weakness: Yes on the right side (mostly due to past stroke), bowel/bladder loss: No, foot drop: No  Associated symptoms: numbness, weakness of right leg and feeling of instability  Orthopedic history: YES - Date: stroke Jan 11th, 2020  Relevant surgical history: NO  Social history: social history: works at [employer], 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    Past Medical History:   Diagnosis Date     Anxiety      Prediabetes      Shoulder capsulitis, right      Social History     Socioeconomic History     Marital status:      Spouse name: Not on file     Number of children: Not on file     Years of education: Not on file     Highest education level: Not on file   Occupational History     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   Social Needs     Financial resource strain: Not on file     Food insecurity     Worry: Not on file     Inability: Not on file     Transportation needs     Medical: Not on file     Non-medical: Not on file   Tobacco Use     Smoking status: Never Smoker     Smokeless tobacco: Never Used   Substance and Sexual Activity     Alcohol use: Never     Frequency: Never     Drug use: Never     Sexual activity: Yes     Partners: Female   Lifestyle     Physical activity     Days per week: Not on file     Minutes per session: Not on file     Stress: Not on file   Relationships     Social connections     Talks on phone: Not on file     Gets together: Not on file     Attends Yazdanism service: Not on file     Active member of club or organization: Not on file     Attends meetings of clubs or organizations: Not on file     Relationship status: Not on file      "Intimate partner violence     Fear of current or ex partner: Not on file     Emotionally abused: Not on file     Physically abused: Not on file     Forced sexual activity: Not on file   Other Topics Concern     Not on file   Social History Narrative     Not on file         Patient's past medical, surgical, social, and family histories were reviewed today and no changes are noted.    REVIEW OF SYSTEMS:  10 point ROS is negative other than symptoms noted above in HPI, Past Medical History or as stated below  Constitutional: NEGATIVE for fever, chills, change in weight  Skin: NEGATIVE for worrisome rashes, moles or lesions  GI/: NEGATIVE for bowel or bladder changes  Neuro: NEGATIVE for weakness, dizziness or paresthesias    OBJECTIVE:  /60   Ht 1.803 m (5' 11\")   Wt 81.6 kg (180 lb)   BMI 25.10 kg/m     General: healthy, alert and in no distress  HEENT: no scleral icterus or conjunctival erythema  Skin: no suspicious lesions or rash. No jaundice.  CV: no pedal edema  Resp: normal respiratory effort without conversational dyspnea   Psych: normal mood and affect  Gait: normal steady gait with appropriate coordination and balance  Neuro: Decreased sensation to light touch of entire right lower extremity.  DTR's 2+ patella and achilles bilaterally.  MSK:  THORACIC/LUMBAR SPINE  Inspection:    No gross deformity/asymmetry  Palpation:    Tender about the lumbar spinous processes. Otherwise remainder of landmarks are nontender.  Range of Motion:     Lumbar flexion limited slightly by pain    Lumbar extension limited slightly by pain  Strength:    Full strength throughout hips/quads/hamstrings  Special Tests:    Positive: straight leg raise (bilateral)      Independent visualization of the below image:  Recent Results (from the past 24 hour(s))   XR Lumbar Spine 2/3 Views    Narrative    LUMBAR SPINE TWO TO THREE VIEWS December 8, 2020 1:50 PM     HISTORY: Acute midline low back pain without " sciatica.    COMPARISON: None.      Impression    IMPRESSION: Lumbar vertebrae are normally aligned. Mild intervertebral  disc space narrowing at L4-L5 and L5-S1. Remaining disc spaces are  well-maintained. No compression deformity or acute fracture.           Albert Yeo MD [facility]

## 2020-12-08 NOTE — PATIENT INSTRUCTIONS
1. Strain of lumbar region, initial encounter    2. Acute midline low back pain without sciatica      -Patient has low back pain due to muscle strain and possible bulging intervertebral disc in the lumbar spine  -Patient will start formal physical therapy and home exercise program  -Patient may take 1 to 2 tablets of exercise Tylenol as needed for pain.  Patient may continue with tizanidine 2 mg as needed.  Patient should avoid all oral anti-inflammatory since he is on Xarelto for a right lower extremity DVT  -Patient will follow up if pain does not improve for reevaluation.  To consider possible MRI lumbar spine if indicated  -Call direct clinic number [[phone]] at any time with questions or concerns.    Albert Yeo MD [facility]  [facility] [place]

## 2020-12-15 ENCOUNTER — OFFICE VISIT (OUTPATIENT)
Dept: SURGERY | Facility: CLINIC | Age: 55
End: 2020-12-15
Attending: INTERNAL MEDICINE
Payer: COMMERCIAL

## 2020-12-15 VITALS — WEIGHT: 180 LBS | HEIGHT: 71 IN | BODY MASS INDEX: 25.2 KG/M2 | OXYGEN SATURATION: 99 % | RESPIRATION RATE: 16 BRPM

## 2020-12-15 DIAGNOSIS — I82.4Z1 ACUTE DEEP VEIN THROMBOSIS (DVT) OF DISTAL VEIN OF RIGHT LOWER EXTREMITY (H): ICD-10-CM

## 2020-12-15 PROCEDURE — 99215 OFFICE O/P EST HI 40 MIN: CPT | Performed by: INTERNAL MEDICINE

## 2020-12-15 ASSESSMENT — MIFFLIN-ST. JEOR: SCORE: 1668.6

## 2020-12-15 NOTE — PROGRESS NOTES
Good Petty is a 55 year old male who is presenting at the current time to discuss his diagnosi(es) of     Acute deep vein thrombosis (DVT) of distal vein of right lower extremity (H)      HPI:      Good Petty is a 55 year old male with history of intracerebral hemorrhage, stroke, hypertension, hyperlipidemia who presented to the ED 8/4/2020 for evaluation of DVT. Per chart review, the patient was admitted in 01/11 and transfer to the [facility] for acute to subacute intraparenchymal hematoma in the left paracentral lobule of unclear etiology on 01/15. He reported in the ED on 8/4/2020 that  he had some RLE swelling with limping for the prior 2 days. He reported that when he used it for walking he had leg pain but that laying/sitting down on the bed his pain was better almost gone. He had duplex as below notably positive for extensive RLE DVT form the SFV to the ankle.     His records were reviewed given that he had an intra cranial hemorrhage for an undetermined reason in January, 2020.  Originally the ED MD was hesitant to anticoagulate him. However, a head CT was unremarkable, and as such the decision was made to therapeutically AC him.  Lovenox and warfarin were chosen out of concern for reversibility.     He then represented to the ED 8/14/2020 with right leg pain. 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 He states he didn't get out of the car as much as he should have. He noted he has some pain in his right leg, which he rated as a 9/10 the night of 8/13 which decreased to a 2/10 by 8/14/2020.  U/S was repeated, which revealed improvement in thrombus.     He was seen by Dr. Nieto in August, [facility] performed a HC w/u upon him. The patietn is not a Leiden or a prothrombin mutant. He has no APLAs. Protein C and S, ATIII were not drawn as he had acute thrombus. D dimer has now normalized. He was switched to Xarelto and  is tolerating that well without bleeding or bruising.     He has been ACd now for four months and thrombus has further improved. He presents today to consider cessation of AC.    Review Of Systems  Skin: negative  Eyes: negative  Ears/Nose/Throat: negative  Respiratory: No shortness of breath, dyspnea on exertion, cough, or hemoptysis  Cardiovascular: negative  Gastrointestinal: negative  Genitourinary: negative  Musculoskeletal: negative  Neurologic: negative  Psychiatric: negative  Hematologic/Lymphatic/Immunologic: negative  Endocrine: negative      PAST MEDICAL HISTORY:                  Past Medical History:   Diagnosis Date     Anxiety      Prediabetes      Shoulder capsulitis, right        PAST SURGICAL HISTORY:                  Past Surgical History:   Procedure Laterality Date     HC TOOTH EXTRACTION W/FORCEP       VASECTOMY         CURRENT MEDICATIONS:                  Current Outpatient Medications   Medication Sig Dispense Refill     amLODIPine (NORVASC) 2.5 MG tablet Take 1 tablet (2.5 mg) by mouth daily 90 tablet 1     latanoprost (XALATAN) 0.005 % ophthalmic solution Place 1 drop into both eyes daily 1 Bottle 0     rivaroxaban ANTICOAGULANT (XARELTO ANTICOAGULANT) 15 MG TABS tablet Take 1 tablet (15 mg) by mouth 2 times daily (with meals) 42 tablet 0     rivaroxaban ANTICOAGULANT (XARELTO ANTICOAGULANT) 20 MG TABS tablet Take 1 tablet (20 mg) by mouth daily (with dinner) 90 tablet 3     simvastatin (ZOCOR) 20 MG tablet TAKE 1 TABLET(20 MG) BY MOUTH EVERY EVENING 90 tablet 3     methylPREDNISolone (MEDROL DOSEPAK) 4 MG tablet therapy pack Take 1 tablet (4 mg) by mouth See Admin Instructions (Patient not taking: Reported on 12/15/2020) 21 tablet 0     oxyCODONE-acetaminophen (PERCOCET) 5-325 MG tablet Take 1-2 tablets by mouth every 4 hours as needed for severe pain (Patient not taking: Reported on 12/15/2020) 12 tablet 0     tiZANidine (ZANAFLEX) 2 MG tablet Take 1 tablet (2 mg) by mouth 3 times daily  (Patient not taking: Reported on 12/15/2020) 12 tablet 0       ALLERGIES:                No Known Allergies    SOCIAL HISTORY:                  Social History     Socioeconomic History     Marital status:      Spouse name: Not on file     Number of children: Not on file     Years of education: Not on file     Highest education level: Not on file   Occupational History     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   Social Needs     Financial resource strain: Not on file     Food insecurity     Worry: Not on file     Inability: Not on file     Transportation needs     Medical: Not on file     Non-medical: Not on file   Tobacco Use     Smoking status: Never Smoker     Smokeless tobacco: Never Used   Substance and Sexual Activity     Alcohol use: Never     Frequency: Never     Drug use: Never     Sexual activity: Yes     Partners: Female   Lifestyle     Physical activity     Days per week: Not on file     Minutes per session: Not on file     Stress: Not on file   Relationships     Social connections     Talks on phone: Not on file     Gets together: Not on file     Attends Baptism service: Not on file     Active member of club or organization: Not on file     Attends meetings of clubs or organizations: Not on file     Relationship status: Not on file     Intimate partner violence     Fear of current or ex partner: Not on file     Emotionally abused: Not on file     Physically abused: Not on file     Forced sexual activity: Not on file   Other Topics Concern     Not on file   Social History Narrative     Not on file       FAMILY HISTORY:                   Family History   Problem Relation Age of Onset     Cerebrovascular Disease Maternal Grandmother          Physical exam Reveals:    O/P: WNL  HEENT: WNL  NECK: No JVD, thyromegaly, or lymphadenopathy  HEART: RRR, no murmurs, gallops, or rubs  LUNGS: CTA bilaterally without rales, wheezes, or rhonchi  GI: NABS, nondistended, nontender, soft  EXT:without  cyanosis, clubbing, or edema  NEURO: nonfocal  : no flank tenderness    VENOUS ULTRASOUND BOTH LEGS  11/2/2020 1:52 PM      HISTORY: Acute deep vein thrombosis (DVT) of distal vein of right  lower extremity (H) swelling in the right lower extremity.     COMPARISON: Venous ultrasound dated 8/14/2020     FINDINGS:  Examination of the deep veins with graded compression and  color flow Doppler with spectral wave form analysis was performed.   There is occlusive thrombus in the right popliteal vein which extends  into one of the two posterior tibial veins at the proximal calf.  Previously seen thrombus in the distal right superficial femoral vein  has resolved. In addition, the clot in the posterior tibial veins has  improved. On the prior exam, the clot extended to the level of the  ankle. Furthermore, previously seen thrombus in the right peroneal  veins has resolved.                                                                      IMPRESSION: Improving DVT in the right lower extremity as above.      BRENDEN MARTINEZ MD      EXAM: US LOWER EXTREMITY VENOUS DUPLEX RIGHT  LOCATION: [facility]  DATE/TIME: 8/14/2020 5:54 PM     INDICATION: Prior DVT with increasing pain.  COMPARISON: 08/04/2020.  TECHNIQUE: Venous Duplex ultrasound of the right lower extremity with and without compression, augmentation and duplex. Color flow and spectral Doppler with waveform analysis performed.     FINDINGS: Exam includes the common femoral, femoral, popliteal, and contralateral common femoral veins as well as segmentally visualized deep calf veins and greater saphenous vein.      RIGHT: Occlusive and nonocclusive DVT within peroneal veins, posterior tibial veins, popliteal and femoral veins similar to prior although the posterior tibial vein thrombus has extended down to the ankle (previously mid calf) and the peroneal thrombus   has improved extending to the mid calf  (previously down to the ankle). No superficial  thrombophlebitis. No popliteal cyst.                                                                      IMPRESSION:  1.  Similar occlusive and nonocclusive DVT with slight improvement within the peroneal vein and worsening within posterior tibial vein within the calf.       US RIGHT LOWER EXTREMITY VENOUS DUPLEX ULTRASOUND   8/4/2020 5:07 PM      HISTORY: Pain of right lower leg     COMPARISON: None available     FINDINGS: Gray-scale, color and Doppler spectral analysis ultrasound  was performed of the right lower extremity. Compression and  augmentation imaging was performed.     The right common femoral vein is fully compressible. The right femoral  vein is fully compressible in the proximal and mid thigh and  noncompressible in the distal thigh. The right popliteal vein is  noncompressible. The posterior tibial vein is noncompressible down to  the mid calf. The peroneal veins are noncompressible down to the  ankle. The great saphenous vein is fully compressible.                                                                      IMPRESSION: Extensive deep venous thrombosis of the right lower  extremity including occlusive thrombosis of the right femoral vein in  the distal thigh, entire popliteal vein, posterior tibial veins down  to the mid calf and peroneal veins down to the ankle.      Findings were discussed with the ordering provider OSMIN HOUSTON by ultrasound technologist at 5:00 PM on 8/4/2020. The  patient was advised to go to the emergency department.     PRERNA COLEY MD    Component      Latest Ref Rng & Units 8/18/2020 11/27/2020   D-Dimer      0.0 - 0.50 ug/ml FEU 0.7 (H) <0.3     A/P:    (I82.4Z1) First lifetime VTE presenting w/o PE as RLE SFV to ankle DVT provoked in association with work as an [employer] .   Comment: Improving with therapeutic AC, provoked vocationally in association with driving as above. He ran out of Xarelto. This was refilled.   Plan: Maintain on AC at 20  mg daily for six months total. Repeat d dimer and RLE duplex at that time. Consider cessation of AC at that time based upon results. If continuing AC beyond six months, consider dose reduction to 10 mg daily consistent with the drug's labelling for chronic treatment of DVT.        Greater than one half the fortyfive minutes total spent on the pt's visit were spent providing education and counselling to the patient regarding the above matters.  Extended time secondary patient being new to me, requiring record review of all the above, as well as discussion with he patient regarding plan

## 2020-12-18 ENCOUNTER — TELEPHONE (OUTPATIENT)
Dept: OTHER | Facility: CLINIC | Age: 55
End: 2020-12-18

## 2020-12-18 NOTE — TELEPHONE ENCOUNTER
Follow up to 12/15/20    Please arrange for:      D dimer (non-fasting lab)    RLE Venous US    In clinic follow up at [facility] one week later.    Dasia Rivera RN BSN  [facility]  [phone]

## 2020-12-21 NOTE — TELEPHONE ENCOUNTER
That is fine as long as he remains on anticoagulation until he completes his tests and has visit with Dr. Boston.    Dianne BURGESSN, RN    [facility]  Office: [phone]  Fax: [phone]

## 2020-12-21 NOTE — TELEPHONE ENCOUNTER
Patient stated that he would like to push lab and f/u appt out into feb/march and if it was ok with Dr. Boston.     Informed patient will send message to nurse and call patient back when instructions/informations are given.     Ema STAPLES

## 2020-12-21 NOTE — TELEPHONE ENCOUNTER
Patient scheduled as follows      2/12/2021 Labs at [place]  2/12/2021 US at [place]  2/23/2021 f/u with Dr. Boston at [facility]

## 2021-01-15 ENCOUNTER — HEALTH MAINTENANCE LETTER (OUTPATIENT)
Age: 56
End: 2021-01-15

## 2021-01-25 ENCOUNTER — TELEPHONE (OUTPATIENT)
Dept: OTHER | Facility: CLINIC | Age: 56
End: 2021-01-25

## 2021-01-25 ENCOUNTER — TELEPHONE (OUTPATIENT)
Dept: INTERNAL MEDICINE | Facility: CLINIC | Age: 56
End: 2021-01-25

## 2021-01-25 NOTE — TELEPHONE ENCOUNTER
Pt reports that he has Right foot lump. This has been 5 days now. Has had in the past. But goes away. It is on the heel. Painful with walking.   He has a history of clots but Dr Boston's office doesn't think this is related.     He is asking for appointment. Scheduled.

## 2021-01-25 NOTE — CONFIDENTIAL NOTE
"Good called, left voice message stating that he is having right foot pain - he has a bump that\" won't allow him to walk\".    I left Mili voice message suggesting that he call his primary care provider as this does not sound like it has a vascular cause.  I asked that he call me back if he has further concerns that this may be vascular.    Dasia LOPEZ  [facility]  [phone]          "

## 2021-01-26 NOTE — TELEPHONE ENCOUNTER
Call to patient. Advised primary care provider does not work tomorrow. No appointments available with other providers. Offered to transfer to scheduling to check a different clinic. Patient will keep appointment tomorrow.

## 2021-01-29 ENCOUNTER — OFFICE VISIT (OUTPATIENT)
Dept: INTERNAL MEDICINE | Facility: CLINIC | Age: 56
End: 2021-01-29
Payer: COMMERCIAL

## 2021-01-29 VITALS
TEMPERATURE: 98.2 F | BODY MASS INDEX: 25.58 KG/M2 | HEART RATE: 89 BPM | WEIGHT: 182.7 LBS | OXYGEN SATURATION: 97 % | RESPIRATION RATE: 17 BRPM | HEIGHT: 71 IN | SYSTOLIC BLOOD PRESSURE: 118 MMHG | DIASTOLIC BLOOD PRESSURE: 78 MMHG

## 2021-01-29 DIAGNOSIS — I82.4Z1 ACUTE DEEP VEIN THROMBOSIS (DVT) OF DISTAL VEIN OF RIGHT LOWER EXTREMITY (H): ICD-10-CM

## 2021-01-29 DIAGNOSIS — S06.320S: ICD-10-CM

## 2021-01-29 DIAGNOSIS — E78.2 MIXED HYPERLIPIDEMIA: ICD-10-CM

## 2021-01-29 DIAGNOSIS — R73.03 PREDIABETES: ICD-10-CM

## 2021-01-29 DIAGNOSIS — M79.671 RIGHT FOOT PAIN: ICD-10-CM

## 2021-01-29 DIAGNOSIS — B07.8 COMMON WART: Primary | ICD-10-CM

## 2021-01-29 DIAGNOSIS — I10 ESSENTIAL HYPERTENSION: ICD-10-CM

## 2021-01-29 PROBLEM — S06.320A INTRAPARENCHYMAL HEMATOMA OF BRAIN, LEFT, WITHOUT LOSS OF CONSCIOUSNESS, INITIAL ENCOUNTER (H): Status: ACTIVE | Noted: 2021-01-29

## 2021-01-29 PROCEDURE — 99214 OFFICE O/P EST MOD 30 MIN: CPT | Performed by: INTERNAL MEDICINE

## 2021-01-29 ASSESSMENT — MIFFLIN-ST. JEOR: SCORE: 1680.85

## 2021-01-29 NOTE — PROGRESS NOTES
Contreras Funk is a 55 year old who presents to clinic today for the following health issues     HPI      Hyperlipidemia Follow-Up      Are you regularly taking any medication or supplement to lower your cholesterol?   Yes- simvastatin     Are you having muscle aches or other side effects that you think could be caused by your cholesterol lowering medication?  No    Hypertension Follow-up      Do you check your blood pressure regularly outside of the clinic? Yes     Are you following a low salt diet? Yes    Are your blood pressures ever more than 140 on the top number (systolic) OR more   than 90 on the bottom number (diastolic), for example 140/90? No    Cerebrovascular Follow-up      Patient history:  Intraparenchymal hematoma of brain    Residual symptoms: Weakness in the leg on the right    Worsened or new symptoms since last visit: No    Daily aspirin use: No on xeralo     Hypertension controlled: Yes    Patient also complains of soreness on bottom of right foot, walking increases the pain ,feels like walking on pebble     Past Medical History:   Diagnosis Date     Anxiety      Prediabetes      Shoulder capsulitis, right        Current Outpatient Medications   Medication Sig Dispense Refill     amLODIPine (NORVASC) 2.5 MG tablet TAKE 1 TABLET(2.5 MG) BY MOUTH DAILY 90 tablet 1     latanoprost (XALATAN) 0.005 % ophthalmic solution Place 1 drop into both eyes daily 1 Bottle 0     rivaroxaban ANTICOAGULANT (XARELTO ANTICOAGULANT) 20 MG TABS tablet Take 1 tablet (20 mg) by mouth daily (with dinner) 90 tablet 3     simvastatin (ZOCOR) 20 MG tablet TAKE 1 TABLET(20 MG) BY MOUTH EVERY EVENING 90 tablet 3       Review of Systems   CONSTITUTIONAL: NEGATIVE for fever, chills, change in weight  EYES: NEGATIVE for vision changes or irritation  ENT/MOUTH: NEGATIVE for ear, mouth and throat problems  RESP: NEGATIVE for significant cough or SOB  CV : NEGATIVE for chest pain, palpitations or peripheral  "edema  NEURO: rt leg weakness, rt foot pain      Objective    /78   Pulse 89   Temp 98.2  F (36.8  C) (Oral)   Resp 17   Ht 1.803 m (5' 11\")   Wt 82.9 kg (182 lb 11.2 oz)   SpO2 97%   BMI 25.48 kg/m    Body mass index is 25.48 kg/m .  Physical Exam   GENERAL: healthy, alert and no distress  EYES: Eyes grossly normal to inspection, PERRL and conjunctivae and sclerae normal  NECK: no adenopathy, no asymmetry, masses, or scars and thyroid normal to palpation  RESP: lungs clear to auscultation - no rales, rhonchi or wheezes  CV: regular rate and rhythm, normal S1 S2, no S3 or S4, no murmur, click or rub, no peripheral edema and peripheral pulses strong  MS:  Trace edema rt LE, no sixto ft tenderness bilaterally   Rt FOOT; callus/wart like lesion noted on plantar rt foot. Tender to touch .       Assessment & Plan     (M79.671) Right foot pain  (B07.8) Common wart    Plan: callus/wart like lesion plantar rt foot, referred to podiatrist.        (I82.4Z1) Acute deep vein thrombosis (DVT) of distal vein of right lower extremity (H)  Plan: on Xarelto and follows up with the vascular medicine          (I10) Essential hypertension  Comment: Blood pressure well controlled  Plan: Continue amlodipine, check comprehensive metabolic panel             (E78.2) Mixed hyperlipidemia  Plan: On simvastatin 20 mg daily, check lipid panel reflex to direct LDL Fasting            (R73.03) Prediabetes  Plan: Hemoglobin A1c            (S06.350S) Intraparenchymal hematoma of brain, left, without loss of consciousness, sequela (H)  Plan: Follows up with her neurologist and on Xarelto     [id number]}     BMI:   Estimated body mass index is 25.48 kg/m  as calculated from the following:    Height as of this encounter: 1.803 m (5' 11\").    Weight as of this encounter: 82.9 kg (182 lb 11.2 oz).   Weight management plan: Discussed healthy diet and exercise guidelines        Return in about 6 months (around 7/29/2021).    Radha CAGE" MD [place]  [facility]

## 2021-01-29 NOTE — NURSING NOTE
"/78   Pulse 89   Temp 98.2  F (36.8  C) (Oral)   Resp 17   Ht 1.803 m (5' 11\")   Wt 82.9 kg (182 lb 11.2 oz)   SpO2 97%   BMI 25.48 kg/m    Patient in for consult small mass in Rt bottom foot.  Amina Haskins CMA    "

## 2021-02-02 ENCOUNTER — OFFICE VISIT (OUTPATIENT)
Dept: PODIATRY | Facility: CLINIC | Age: 56
End: 2021-02-02
Attending: INTERNAL MEDICINE
Payer: COMMERCIAL

## 2021-02-02 VITALS
HEIGHT: 71 IN | BODY MASS INDEX: 25.48 KG/M2 | DIASTOLIC BLOOD PRESSURE: 76 MMHG | SYSTOLIC BLOOD PRESSURE: 130 MMHG | WEIGHT: 182 LBS

## 2021-02-02 DIAGNOSIS — M79.671 RIGHT FOOT PAIN: ICD-10-CM

## 2021-02-02 DIAGNOSIS — L85.9 HYPERKERATOSIS: Primary | ICD-10-CM

## 2021-02-02 DIAGNOSIS — L97.521 ULCERATED, FOOT, LEFT, LIMITED TO BREAKDOWN OF SKIN (H): ICD-10-CM

## 2021-02-02 DIAGNOSIS — B07.8 COMMON WART: ICD-10-CM

## 2021-02-02 PROCEDURE — 99243 OFF/OP CNSLTJ NEW/EST LOW 30: CPT | Mod: 25 | Performed by: PODIATRIST

## 2021-02-02 PROCEDURE — 97597 DBRDMT OPN WND 1ST 20 CM/<: CPT | Performed by: PODIATRIST

## 2021-02-02 ASSESSMENT — MIFFLIN-ST. JEOR: SCORE: 1677.68

## 2021-02-02 NOTE — PATIENT INSTRUCTIONS
CALLUS / CORNS / IPKs  When there is excessive friction or pressure on the skin, the body responds by making the skin thicker to protect the deeper structures from becoming exposed. While this works well to protect the deeper structures, the thickened skin can increase pressure and pain.    CALLUS: Flat, diffuse thickening are simple calluses and they are usually caused by friction. Often these are the result of rubbing on a shoe or going barefoot.    CORNS: Calluses with a central core between the toes are called corns. These result from prominent joints on adjacent toes rubbing together. Theses are a symptom of bone malalignment and will always recur unless the underlying bones are addressed surgically.    IPKs: Calluses with a central core on the ball of the foot are usually IPKs (intractable plantar keratosis). These are caused by excessive pressure from the metatarsals, the bones that make up the ball of the foot. Often one of these bones is too long or too prominent.  Again, these will always recur unless the underlying bone issue is addressed. There is no cure for these. They will either go away by themselves, recur, or more could develop.    ROUTINE MAINTENANCE  1. File them down with a pumice stone or callus file a couple times a week.   2. An electric callus removing device. Amope Pedi Perfect Electronic Pedicure Foot File and Callus Remover can be a good option.   3. Lotion can be applied to soften the callus. A urea based cream such as Kersal or Vanicream or thicker cream with shea butter are good options.  4. Toe spacers or toe covers can be used for corns, gel pads can be used for other lesions on the bottom of the foot.   If there is a surgical pathology noted, such as a prominent bone, often this needs to be addressed surgically to minimize recurrence. However, sometimes the lesion simply migrates to another spot after surgery, so it is not a guaranteed cure.     There was also discussion of the cost  structure of callus care if they were to come back and have it treated in the clinic. Explained that if insurance does not cover it, they would be billed. This charge could range from $100 - $160.  They were also provided information on places to get the callus treatment.    ROUTINE FOOT CARE (NAIL TRIMMING / CALLUSES)    Go to afcna.org ([employer]) and search for providers near you.  Otherwise, this is a list we have gathered of  recommended locations/providers in [place].    [facility]   [phone]   [facility]  [phone]  [email or web address]   [facility]  [phone]  [place] + [place] mile radius [facility]  [phone]  [facility].   Monisha Ontiveros, DPM  [address] [place] [address] [phone] Parker Ramsey, DPM  [address] [place] [address] [phone]   [facility]  [phone] [address] [place] [address]  [facility]  Dr. Ranjith Casey  [phone]  [place], [place]   [facility]  [phone]  [place] & [place] Locations  (does not take BCBS) FYI:  *Some providers accept insurance while others are out of pocket. Please contact them for details*

## 2021-02-02 NOTE — PROGRESS NOTES
"Foot & Ankle Surgery  February 2, 2021    CC: \"bump/soreness on heel of right foot\"    I was asked to see Good Petty regarding the chief complaint by:  Dr Trotter    HPI:  Pt is a 55 year old male who presents with above complaint.  5 year history of right lower extremity pain.  \"soreness to go away\" is goal.  Pain 9/10 \"approximately 6 months apart\".  Worse with \"walking\".  \"nothing\" for treatment.  No injury/puncture wound, but feels like broken glass in foot.  5 years, painful twice a year, goes away after a few days.  Recent troke with blood clots in right leg.      ROS:   Pos for CC.  The patient denies current nausea, vomiting, chills, fevers, belly pain, calf pain, chest pain or SOB.  Complete remainder of ROS is otherwise neg.    VITALS:    Vitals:    02/02/21 1045   BP: 130/76   Weight: 82.6 kg (182 lb)   Height: 1.803 m (5' 11\")       PMH:    Past Medical History:   Diagnosis Date     Anxiety      Prediabetes      Shoulder capsulitis, right        SXHX:    Past Surgical History:   Procedure Laterality Date     HC TOOTH EXTRACTION W/FORCEP       VASECTOMY          MEDS:    Current Outpatient Medications   Medication     amLODIPine (NORVASC) 2.5 MG tablet     latanoprost (XALATAN) 0.005 % ophthalmic solution     rivaroxaban ANTICOAGULANT (XARELTO ANTICOAGULANT) 20 MG TABS tablet     simvastatin (ZOCOR) 20 MG tablet     No current facility-administered medications for this visit.        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   Social Needs     Financial resource strain: Not on file     Food insecurity     Worry: " Not on file     Inability: Not on file     Transportation needs     Medical: Not on file     Non-medical: Not on file   Tobacco Use     Smoking status: Never Smoker     Smokeless tobacco: Never Used   Substance and Sexual Activity     Alcohol use: Never     Frequency: Never     Drug use: Never     Sexual activity: Yes     Partners: Female   Lifestyle     Physical activity     Days per week: Not on file     Minutes per session: Not on file     Stress: Not on file   Relationships     Social connections     Talks on phone: Not on file     Gets together: Not on file     Attends Jewish service: Not on file     Active member of club or organization: Not on file     Attends meetings of clubs or organizations: Not on file     Relationship status: Not on file     Intimate partner violence     Fear of current or ex partner: Not on file     Emotionally abused: Not on file     Physically abused: Not on file     Forced sexual activity: Not on file   Other Topics Concern     Not on file   Social History Narrative     Not on file           EXAMINATION:  Gen:   No apparent distress  Neuro:   A&Ox3, no deficits  Psych:    Answering questions appropriately for age and situation with normal affect  Head:    NCAT  Eye:    Visual scanning without deficit  Ear:    Response to auditory stimuli wnl  Lung:    Non-labored breathing on RA noted  Abd:    NTND per patient report  Lymph:    Neg for pitting/non-pitting edema BLE  Vasc:    Pulses palpable, CFT minimally delayed  Neuro:    Light touch sensation intact to all sensory nerve distributions without paresthesias  Derm:    Nucleated hyperkeratosis with dried blood and a small open wound underneath.  No exposed fat, no SOI.  No foreign body noted.    MSK:    ROM, strength wnl without limitation, no pain on palpation noted.  Calf:    Neg for redness, swelling or tenderness    Assessment:  55 year old male with nucleated hyperkeratosis with small underlying wound right heel      Plan:   Discussed etiologies, anatomy and options  1.  Nucleated hyperkeratosis with underlying ulceration without exposed fat.    -Excisional debridement was performed, partial-thickness(limited to skin breakdown, no exposed subcutaneous fat), sharply debriding the wound, excising nonviable tissue to the above dimensions with a 15 blade  -daily wound care - wash/dry, abx ointment/bandaid(supplies dispensed) until the wound is healed  -once healed, stop wound care and start hyperkeratosis cares(handout dispensed)  -no indication for PO abx  -if wound heals uneventfully, on follow up needed  -call with questions/concerns     Follow up:  prn or sooner with acute issues      Patient's medical history was reviewed today      Jose Lopez DPM FACFAS FACFAOM  Podiatric Foot & Ankle Surgeon  [facility]  [phone]

## 2021-02-12 ENCOUNTER — HOSPITAL ENCOUNTER (OUTPATIENT)
Dept: ULTRASOUND IMAGING | Facility: CLINIC | Age: 56
Discharge: HOME | End: 2021-02-12
Attending: INTERNAL MEDICINE | Admitting: INTERNAL MEDICINE
Payer: COMMERCIAL

## 2021-02-12 DIAGNOSIS — I10 ESSENTIAL HYPERTENSION: ICD-10-CM

## 2021-02-12 DIAGNOSIS — R73.03 PREDIABETES: ICD-10-CM

## 2021-02-12 DIAGNOSIS — E78.2 MIXED HYPERLIPIDEMIA: ICD-10-CM

## 2021-02-12 DIAGNOSIS — I82.4Z1 ACUTE DEEP VEIN THROMBOSIS (DVT) OF DISTAL VEIN OF RIGHT LOWER EXTREMITY (H): ICD-10-CM

## 2021-02-12 LAB
D DIMER PPP FEU-MCNC: <0.3 UG/ML FEU (ref 0–0.5)
HBA1C MFR BLD: 5.7 % (ref 0–5.6)

## 2021-02-12 PROCEDURE — 85379 FIBRIN DEGRADATION QUANT: CPT | Performed by: INTERNAL MEDICINE

## 2021-02-12 PROCEDURE — 83036 HEMOGLOBIN GLYCOSYLATED A1C: CPT | Performed by: INTERNAL MEDICINE

## 2021-02-12 PROCEDURE — 80061 LIPID PANEL: CPT | Performed by: INTERNAL MEDICINE

## 2021-02-12 PROCEDURE — 36415 COLL VENOUS BLD VENIPUNCTURE: CPT | Performed by: INTERNAL MEDICINE

## 2021-02-12 PROCEDURE — 93971 EXTREMITY STUDY: CPT | Mod: RT

## 2021-02-12 PROCEDURE — 80053 COMPREHEN METABOLIC PANEL: CPT | Performed by: INTERNAL MEDICINE

## 2021-02-13 LAB
ALBUMIN SERPL-MCNC: 3.9 G/DL (ref 3.4–5)
ALP SERPL-CCNC: 60 U/L (ref 40–150)
ALT SERPL W P-5'-P-CCNC: 31 U/L (ref 0–70)
ANION GAP SERPL CALCULATED.3IONS-SCNC: 5 MMOL/L (ref 3–14)
AST SERPL W P-5'-P-CCNC: 21 U/L (ref 0–45)
BILIRUB SERPL-MCNC: 0.4 MG/DL (ref 0.2–1.3)
BUN SERPL-MCNC: 24 MG/DL (ref 7–30)
CALCIUM SERPL-MCNC: 9.2 MG/DL (ref 8.5–10.1)
CHLORIDE SERPL-SCNC: 109 MMOL/L (ref 94–109)
CHOLEST SERPL-MCNC: 140 MG/DL
CO2 SERPL-SCNC: 28 MMOL/L (ref 20–32)
CREAT SERPL-MCNC: 1.18 MG/DL (ref 0.66–1.25)
GFR SERPL CREATININE-BSD FRML MDRD: 68 ML/MIN/{1.73_M2}
GLUCOSE SERPL-MCNC: 102 MG/DL (ref 70–99)
HDLC SERPL-MCNC: 56 MG/DL
LDLC SERPL CALC-MCNC: 75 MG/DL
NONHDLC SERPL-MCNC: 84 MG/DL
POTASSIUM SERPL-SCNC: 4.1 MMOL/L (ref 3.4–5.3)
PROT SERPL-MCNC: 6.7 G/DL (ref 6.8–8.8)
SODIUM SERPL-SCNC: 142 MMOL/L (ref 133–144)
TRIGL SERPL-MCNC: 44 MG/DL

## 2021-02-23 ENCOUNTER — OFFICE VISIT (OUTPATIENT)
Dept: SURGERY | Facility: CLINIC | Age: 56
End: 2021-02-23
Attending: INTERNAL MEDICINE
Payer: COMMERCIAL

## 2021-02-23 VITALS
WEIGHT: 182 LBS | RESPIRATION RATE: 16 BRPM | DIASTOLIC BLOOD PRESSURE: 74 MMHG | OXYGEN SATURATION: 99 % | SYSTOLIC BLOOD PRESSURE: 128 MMHG | BODY MASS INDEX: 25.48 KG/M2 | HEART RATE: 70 BPM | HEIGHT: 71 IN

## 2021-02-23 DIAGNOSIS — I82.4Z1 ACUTE DEEP VEIN THROMBOSIS (DVT) OF DISTAL VEIN OF RIGHT LOWER EXTREMITY (H): ICD-10-CM

## 2021-02-23 PROCEDURE — 99214 OFFICE O/P EST MOD 30 MIN: CPT | Performed by: INTERNAL MEDICINE

## 2021-02-23 ASSESSMENT — MIFFLIN-ST. JEOR: SCORE: 1677.68

## 2021-02-23 NOTE — PROGRESS NOTES
Good Petty is a 55 year old male who is presenting at the current time to discuss his diagnosi(es) of     Acute deep vein thrombosis (DVT) of distal vein of right lower extremity (H)  .         HPI:       Good Petty is a 55 year old male with history of intracerebral hemorrhage, stroke, hypertension, hyperlipidemia who presented to the ED 8/4/2020 for evaluation of DVT. Per chart review, the patient was admitted in 01/11 and transfer to the [facility] for acute to subacute intraparenchymal hematoma in the left paracentral lobule of unclear etiology on 01/15. He reported in the ED on 8/4/2020 that  he had some RLE swelling with limping for the prior 2 days. He reported that when he used it for walking he had leg pain but that laying/sitting down on the bed his pain was better almost gone. He had duplex as below notably positive for extensive RLE DVT form the SFV to the ankle.      His records were reviewed given that he had an intra cranial hemorrhage for an undetermined reason in January, 2020.  Originally the ED MD was hesitant to anticoagulate him. However, a head CT was unremarkable, and as such the decision was made to therapeutically AC him.  Lovenox and warfarin were chosen out of concern for reversibility.      He then represented to the ED 8/14/2020 with right leg pain. 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 He states he didn't get out of the car as much as he should have. He noted he has some pain in his right leg, which he rated as a 9/10 the night of 8/13 which decreased to a 2/10 by 8/14/2020.  U/S was repeated, which revealed improvement in thrombus.      He was seen by Dr. Nieto in August, who performed a HC w/u upon him. The patietn is not a Leiden or a prothrombin mutant. He has no APLAs. Protein C and S, ATIII were not drawn as he had acute thrombus. D dimer has now normalized. He was switched to  Xarelto and is tolerating that well without bleeding or bruising.      He has been ACd now for seven months and thrombus has further improved.  He presents today to consider cessation of AC.    Review Of Systems  Skin: negative  Eyes: negative  Ears/Nose/Throat: negative  Respiratory: No shortness of breath, dyspnea on exertion, cough, or hemoptysis  Cardiovascular: negative  Gastrointestinal: negative  Genitourinary: negative  Musculoskeletal: negative  Neurologic: negative  Psychiatric: negative  Hematologic/Lymphatic/Immunologic: negative  Endocrine: negative        PAST MEDICAL HISTORY:                  Past Medical History:   Diagnosis Date     Anxiety      Prediabetes      Shoulder capsulitis, right        PAST SURGICAL HISTORY:                  Past Surgical History:   Procedure Laterality Date     HC TOOTH EXTRACTION W/FORCEP       VASECTOMY         CURRENT MEDICATIONS:                  Current Outpatient Medications   Medication Sig Dispense Refill     amLODIPine (NORVASC) 2.5 MG tablet TAKE 1 TABLET(2.5 MG) BY MOUTH DAILY 90 tablet 1     latanoprost (XALATAN) 0.005 % ophthalmic solution Place 1 drop into both eyes daily 1 Bottle 0     rivaroxaban ANTICOAGULANT (XARELTO ANTICOAGULANT) 20 MG TABS tablet Take 1 tablet (20 mg) by mouth daily (with dinner) 90 tablet 3     simvastatin (ZOCOR) 20 MG tablet TAKE 1 TABLET(20 MG) BY MOUTH EVERY EVENING 90 tablet 3       ALLERGIES:                No Known Allergies    SOCIAL HISTORY:                  Social History     Socioeconomic History     Marital status:      Spouse name: Not on file     Number of children: Not on file     Years of education: Not on file     Highest education level: Not on file   Occupational History     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   Social Needs     Financial resource strain: Not on file     Food insecurity     Worry: Not on file     Inability: Not on file     Transportation needs     Medical: Not on file      Non-medical: Not on file   Tobacco Use     Smoking status: Never Smoker     Smokeless tobacco: Never Used   Substance and Sexual Activity     Alcohol use: Never     Frequency: Never     Drug use: Never     Sexual activity: Yes     Partners: Female   Lifestyle     Physical activity     Days per week: Not on file     Minutes per session: Not on file     Stress: Not on file   Relationships     Social connections     Talks on phone: Not on file     Gets together: Not on file     Attends Congregational service: Not on file     Active member of club or organization: Not on file     Attends meetings of clubs or organizations: Not on file     Relationship status: Not on file     Intimate partner violence     Fear of current or ex partner: Not on file     Emotionally abused: Not on file     Physically abused: Not on file     Forced sexual activity: Not on file   Other Topics Concern     Not on file   Social History Narrative     Not on file       FAMILY HISTORY:                   Family History   Problem Relation Age of Onset     Cerebrovascular Disease Maternal Grandmother          Physical exam Reveals:    O/P: WNL  HEENT: WNL  NECK: No JVD, thyromegaly, or lymphadenopathy  HEART: RRR, no murmurs, gallops, or rubs  LUNGS: CTA bilaterally without rales, wheezes, or rhonchi  GI: NABS, nondistended, nontender, soft  EXT:without cyanosis, clubbing, or edema  NEURO: nonfocal  : no flank tenderness      Component      Latest Ref Rng & Units 8/18/2020 11/27/2020 2/12/2021   D-Dimer      0.0 - 0.50 ug/ml FEU 0.7 (H) <0.3 <0.3     US RIGHT LOWER EXTREMITY VENOUS DUPLEX ULTRASOUND  2/12/2021 3:00 PM     CLINICAL HISTORY/INDICATION: Acute deep vein thrombosis (DVT) of  distal vein of right lower extremity (H).     COMPARISON: 11/2/2020     TECHNIQUE:   Grayscale, color-flow, and spectral waveform analysis were performed  of the deep veins of the right lower extremity     FINDINGS:   Nonocclusive deep vein thrombosis in the right  popliteal and one of  the paired posterior tibial veins, unchanged. The right common right  external iliac, common femoral, femoral and peroneal veins are  compressible with spectral waveform color flow and augmentation.      The left external iliac vein was evaluated for comparison and is  normal.                                                                      IMPRESSION:   Nonocclusive chronic-appearing thrombus in the right popliteal and one  of the posterior tibial veins, not significantly changed when compared  to the prior study.     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   Comment: Improving with therapeutic AC, provoked vocationally in association with driving as above. His d dimer is normal. His thrombus is improved from originally, but is unchanged in comparison to 11/2020. He continues to work as an [employer] , thereby placing him at increased risk of recurrence.  Plan: Maintain on AC but reduce dose from 20 mg daily to 10 mg daily onsistent with the drug's labelling for chronic treatment of DVT. . Continue AC at reduced dose for six additional months. Repeat d dimer and RLE duplex at that time. Consider cessation of AC at that time based upon results. If  D dimer still normal but thrombus unchanged, this would likely by that time have reendothelialized, and as such , I would favor cessation of AC at that 12 month lilia if unchanged in the interim.     Greater than one half the 30  minutes total spent on the pt's visit were spent providing education and counselling to the patient regarding the above matters.

## 2021-02-24 NOTE — PLAN OF CARE
"  VS: Blood pressure 125/75, pulse 83, temperature 98.2  F (36.8  C), temperature source Oral, resp. rate 16, height 1.803 m (5' 11\"), weight 84.8 kg (187 lb), SpO2 98 %.  Patient is A&O x 3, LS clear, BS active   O2: 98% at RA   Output: Adequate, PVR done with low volumes per flow sheet   Last BM: 1/14   Activity: Up with assist of one using a walker and gait belt   Skin: intact   Pain: Denies pain, SOB or CP   CMS: intact   Dressing: None   Diet: Regular diet   LDA: None   Equipment: None   Plan: Continue to monitor   Additional Info:        " Render Post-Care In The Note: no Protocol For Photochemotherapy For Severe Photoresponsive Dermatoses: Tar And Nbuvb (Goeckerman Treatment): The patient received Photochemotherapy for severe photoresponsive dermatoses: Tar and NBUVB (Goeckerman treatment) requiring at least 4 to 8 hours of care under direct physician supervision. Protocol For Nbuvb: The patient received NBUVB. Protocol For Photochemotherapy: Baby Oil And Nbuvb: The patient received Photochemotherapy: Baby Oil and NBUVB (baby oil applied to all lesions prior to phototherapy). Protocol For Broad Band Uvb: The patient received Broad Band UVB. Protocol For Photochemotherapy: Triamcinolone Ointment And Nbuvb: The patient received Photochemotherapy: Triamcinolone and NBUVB (triamcinolone ointment applied to all lesions prior to phototherapy). Protocol For Photochemotherapy For Severe Photoresponsive Dermatoses: Petrolatum And Broad Band Uvb: The patient received Photochemotherapyfor severe photoresponsive dermatoses: Petrolatum and Broad Band UVB requiring at least 4 to 8 hours of care under direct physician supervision. Protocol For Photochemotherapy: Petrolatum And Nbuvb: The patient received Photochemotherapy: Petrolatum and NBUVB (petrolatum applied to all lesions prior to phototherapy). Protocol For Photochemotherapy For Severe Photoresponsive Dermatoses: Petrolatum And Nbuvb: The patient received Photochemotherapy for severe photoresponsive dermatoses: Petrolatum and NBUVB requiring at least 4 to 8 hours of care under direct physician supervision. Protocol For Photochemotherapy For Severe Photoresponsive Dermatoses: Puva: The patient received Photochemotherapy for severe photoresponsive dermatoses: PUVA requiring at least 4 to 8 hours of care under direct physician supervision. Protocol For Protocol For Photochemotherapy For Severe Photoresponsive Dermatoses: Bath Puva: The patient received Photochemotherapy for severe photoresponsive dermatoses: Bath PUVA requiring at least 4 to 8 hours of care under direct physician supervision. Protocol For Photochemotherapy: Tar And Nbuvb (Goeckerman Treatment): The patient received Photochemotherapy: Tar and NBUVB (Goeckerman treatment). Protocol For Bath Puva: The patient received Bath PUVA. Total Body Energy: 1947 Protocol For Photochemotherapy For Severe Photoresponsive Dermatoses: Tar And Broad Band Uvb (Goeckerman Treatment): The patient received Photochemotherapy for severe photoresponsive dermatoses: Tar and Broad Band UVB (Goeckerman treatment) requiring at least 4 to 8 hours of care under direct physician supervision. Protocol For Uva1: The patient received UVA1. Protocol For Photochemotherapy: Tar And Broad Band Uvb (Goeckerman Treatment): The patient received Photochemotherapy: Tar and Broad Band UVB (Goeckerman treatment). Detail Level: Zone Protocol: NBUVB Consent: Written consent obtained.  The risks were reviewed with the patient including but not limited to: burn, pigmentary changes, pain, blistering, scabbing, redness, increased risk of skin cancers, and the remote possibility of scarring. Name Of Supervising Technician: moy Protocol For Nb Uva: The patient received NB UVA. Protocol For Puva: The patient received PUVA. Protocol For Photochemotherapy: Mineral Oil And Broad Band Uvb: The patient received Photochemotherapy: Mineral Oil and Broad Band UVB. Total Body Time: 3.2 Post-Care Instructions: I reviewed with the patient in detail post-care instructions. Patient is to wear sun protection. Patients may expect sunburn like redness, discomfort and scabbing. Protocol For Photochemotherapy: Petrolatum And Broad Band Uvb: The patient received Photochemotherapy: Petrolatum and Broad Band UVB. Protocol For Uva: The patient received UVA. Protocol For Photochemotherapy: Mineral Oil And Nbuvb: The patient received Photochemotherapy: Mineral Oil and NBUVB (mineral oil applied to all lesions prior to phototherapy).

## 2021-03-09 DIAGNOSIS — I10 ESSENTIAL HYPERTENSION: ICD-10-CM

## 2021-03-11 DIAGNOSIS — I10 ESSENTIAL HYPERTENSION: ICD-10-CM

## 2021-03-11 RX ORDER — AMLODIPINE BESYLATE 2.5 MG/1
TABLET ORAL
Qty: 90 TABLET | Refills: 1 | OUTPATIENT
Start: 2021-03-11

## 2021-03-12 RX ORDER — AMLODIPINE BESYLATE 2.5 MG/1
TABLET ORAL
Qty: 90 TABLET | Refills: 3 | Status: SHIPPED | OUTPATIENT
Start: 2021-03-12 | End: 2022-04-18

## 2021-03-12 NOTE — TELEPHONE ENCOUNTER
Patient calling back, he needs to know if he can stop taking the amlodipine or if he should still be taking it please re-send the RX to [facility] as they do not have the RX sent on 1/11/2021.

## 2021-03-12 NOTE — TELEPHONE ENCOUNTER
Contacted patient. He states that he will follow whatever Dr. Trotter recommends he take, but he is having issues refillingl this. Our office has told him earlier that Dr. Trotter refilled prescription in January, but [facility] tells him they do not have a new prescription on file. This RN will call [facility] to follow-up, will only call patient if there is an issue getting the medication filled.     Spoke to Amina at [facility], she confirms they did not receive prescription from our office in January. Requesting to have prescription resent.     Prescription approved per [facility] Refill Protocol.

## 2021-06-01 DIAGNOSIS — I61.8 OTHER NONTRAUMATIC INTRACEREBRAL HEMORRHAGE, UNSPECIFIED LATERALITY (H): ICD-10-CM

## 2021-06-02 RX ORDER — SIMVASTATIN 20 MG
TABLET ORAL
Qty: 90 TABLET | Refills: 3 | Status: SHIPPED | OUTPATIENT
Start: 2021-06-02 | End: 2022-05-25

## 2021-07-19 DIAGNOSIS — I61.8 OTHER NONTRAUMATIC INTRACEREBRAL HEMORRHAGE, UNSPECIFIED LATERALITY (H): ICD-10-CM

## 2021-07-21 RX ORDER — SIMVASTATIN 20 MG
TABLET ORAL
Qty: 90 TABLET | Refills: 3 | OUTPATIENT
Start: 2021-07-21

## 2021-07-28 ENCOUNTER — MYC MEDICAL ADVICE (OUTPATIENT)
Dept: INTERNAL MEDICINE | Facility: CLINIC | Age: 56
End: 2021-07-28

## 2021-08-20 ENCOUNTER — LAB (OUTPATIENT)
Dept: LAB | Facility: CLINIC | Age: 56
End: 2021-08-20
Payer: COMMERCIAL

## 2021-08-20 DIAGNOSIS — I82.4Z1 ACUTE DEEP VEIN THROMBOSIS (DVT) OF DISTAL VEIN OF RIGHT LOWER EXTREMITY (H): ICD-10-CM

## 2021-08-20 LAB — D DIMER PPP FEU-MCNC: <0.27 UG/ML FEU (ref 0–0.5)

## 2021-08-20 PROCEDURE — 36415 COLL VENOUS BLD VENIPUNCTURE: CPT

## 2021-08-20 PROCEDURE — 85379 FIBRIN DEGRADATION QUANT: CPT

## 2021-08-25 ENCOUNTER — OFFICE VISIT (OUTPATIENT)
Dept: INTERNAL MEDICINE | Facility: CLINIC | Age: 56
End: 2021-08-25
Payer: COMMERCIAL

## 2021-08-25 VITALS
DIASTOLIC BLOOD PRESSURE: 81 MMHG | OXYGEN SATURATION: 99 % | RESPIRATION RATE: 15 BRPM | WEIGHT: 180.5 LBS | HEART RATE: 87 BPM | HEIGHT: 71 IN | SYSTOLIC BLOOD PRESSURE: 127 MMHG | TEMPERATURE: 97.8 F | BODY MASS INDEX: 25.27 KG/M2

## 2021-08-25 DIAGNOSIS — R73.03 PREDIABETES: ICD-10-CM

## 2021-08-25 DIAGNOSIS — I82.4Z1 ACUTE DEEP VEIN THROMBOSIS (DVT) OF DISTAL VEIN OF RIGHT LOWER EXTREMITY (H): ICD-10-CM

## 2021-08-25 DIAGNOSIS — Z20.822 EXPOSURE TO COVID-19 VIRUS: ICD-10-CM

## 2021-08-25 DIAGNOSIS — E78.2 MIXED HYPERLIPIDEMIA: ICD-10-CM

## 2021-08-25 DIAGNOSIS — I63.9 CEREBROVASCULAR ACCIDENT (CVA), UNSPECIFIED MECHANISM (H): ICD-10-CM

## 2021-08-25 DIAGNOSIS — I10 ESSENTIAL HYPERTENSION: Primary | ICD-10-CM

## 2021-08-25 LAB — HBA1C MFR BLD: 5.6 % (ref 0–5.6)

## 2021-08-25 PROCEDURE — 99000 SPECIMEN HANDLING OFFICE-LAB: CPT | Performed by: INTERNAL MEDICINE

## 2021-08-25 PROCEDURE — 80053 COMPREHEN METABOLIC PANEL: CPT | Performed by: INTERNAL MEDICINE

## 2021-08-25 PROCEDURE — 99214 OFFICE O/P EST MOD 30 MIN: CPT | Performed by: INTERNAL MEDICINE

## 2021-08-25 PROCEDURE — 36415 COLL VENOUS BLD VENIPUNCTURE: CPT | Performed by: INTERNAL MEDICINE

## 2021-08-25 PROCEDURE — 83036 HEMOGLOBIN GLYCOSYLATED A1C: CPT | Performed by: INTERNAL MEDICINE

## 2021-08-25 PROCEDURE — 86769 SARS-COV-2 COVID-19 ANTIBODY: CPT | Mod: 90 | Performed by: INTERNAL MEDICINE

## 2021-08-25 ASSESSMENT — MIFFLIN-ST. JEOR: SCORE: 1665.87

## 2021-08-25 NOTE — PROGRESS NOTES
"    Assessment & Plan     (I10) Essential hypertension    Comment: BP stable   Plan: continue amlodipine 2.5 mg daily , check Comprehensive metabolic panel  Advised to follow low salt diet and exercise and f/u in 6 mths.       (I63.9) Cerebrovascular accident (CVA), unspecified mechanism (H)  Plan: stable , follows up with neurologist, on xarelto     (R73.03) Prediabetes  Plan: Hemoglobin A1c            (E78.2) Mixed hyperlipidemia  Plan: LDL 75, on simvastatin ,check lipids 02/22    (I82.4Z1) Acute deep vein thrombosis (DVT) of distal vein of right lower extremity (H)  Plan: on xarelto, follows up with vascular .    (Z20.822) Exposure to COVID-19 virus  Plan: COVID-19 Vance RBD Citlalli & Titer Reflex.pt was told I will contact after results and proceed accordingly.       Review of the result(s) of each unique test - A1c, CMP , covid ab        BMI:   Estimated body mass index is 25.17 kg/m  as calculated from the following:    Height as of this encounter: 1.803 m (5' 11\").    Weight as of this encounter: 81.9 kg (180 lb 8 oz).   Weight management plan: Discussed healthy diet and exercise guidelines       Return in about 6 months (around 2/25/2022).    Radha Trotter MD  [facility] PABLO Funk is a 56 year old who presents for the following health issues     HPI       Hyperlipidemia Follow-Up      Are you regularly taking any medication or supplement to lower your cholesterol?   Yes- simvastatin     Are you having muscle aches or other side effects that you think could be caused by your cholesterol lowering medication?  No    Hypertension Follow-up      Do you check your blood pressure regularly outside of the clinic? No     Are you following a low salt diet? Yes    Are your blood pressures ever more than 140 on the top number (systolic) OR more   than 90 on the bottom number (diastolic), for example 140/90? No    Cerebrovascular Follow-up      Patient history: hemorrhagic " "cerebrovascular incident- follows up with neurologist.     Residual symptoms: None,but says rt leg is not completely baseline( also had rt DVT)     Worsened or new symptoms since last visit: No    Daily aspirin use: No on Xarelto     Hypertension controlled: Yes    H/o Rt LE DVT; on xarelto and follows up with vascular.    Pt says all his family had Covid few months ago, pt was in close contact with them and did not get any symptoms. Pt would like to know if he had covid.        Past Medical History:   Diagnosis Date     Anxiety      Prediabetes      Shoulder capsulitis, right        Current Outpatient Medications   Medication Sig Dispense Refill     amLODIPine (NORVASC) 2.5 MG tablet TAKE 1 TABLET(2.5 MG) BY MOUTH DAILY 90 tablet 3     latanoprost (XALATAN) 0.005 % ophthalmic solution Place 1 drop into both eyes daily 1 Bottle 0     simvastatin (ZOCOR) 20 MG tablet TAKE 1 TABLET(20 MG) BY MOUTH EVERY EVENING 90 tablet 3     rivaroxaban ANTICOAGULANT (XARELTO ANTICOAGULANT) 20 MG TABS tablet Take 1 tablet (20 mg) by mouth daily (with dinner) 90 tablet 3       Review of Systems   CONSTITUTIONAL: NEGATIVE for fever, chills, change in weight  EYES: NEGATIVE for vision changes or irritation  RESP: NEGATIVE for significant cough or SOB  CV: NEGATIVE for chest pain, palpitations or peripheral edema  NEURO: Hx CVA  PSYCHIATRIC: NEGATIVE for changes in mood or affect      Objective    /81   Pulse 87   Temp 97.8  F (36.6  C) (Oral)   Resp 15   Ht 1.803 m (5' 11\")   Wt 81.9 kg (180 lb 8 oz)   SpO2 99%   BMI 25.17 kg/m    Body mass index is 25.17 kg/m .  Physical Exam   GENERAL: healthy, alert and no distress  NECK: no adenopathy, no asymmetry, masses, or scars and thyroid normal to palpation  RESP: lungs clear to auscultation - no rales, rhonchi or wheezes  CV: regular rate and rhythm,    MS: Rt leg compression socks, no calf tenderness trav LE  NEURO: Normal strength and tone bilateral UE/LE ,mentation intact and " speech normal  PSYCH: mentation appears normal, affect normal/bright

## 2021-08-25 NOTE — NURSING NOTE
"/81   Pulse 87   Temp 97.8  F (36.6  C) (Oral)   Resp 15   Ht 1.803 m (5' 11\")   Wt 81.9 kg (180 lb 8 oz)   SpO2 99%   BMI 25.17 kg/m    Patient in for follow up.  Amina Haskins CMA    "

## 2021-08-26 ENCOUNTER — HOSPITAL ENCOUNTER (OUTPATIENT)
Dept: ULTRASOUND IMAGING | Facility: CLINIC | Age: 56
End: 2021-08-26
Attending: INTERNAL MEDICINE
Payer: COMMERCIAL

## 2021-08-26 ENCOUNTER — OFFICE VISIT (OUTPATIENT)
Dept: OTHER | Facility: CLINIC | Age: 56
End: 2021-08-26
Attending: INTERNAL MEDICINE
Payer: COMMERCIAL

## 2021-08-26 VITALS
WEIGHT: 182 LBS | RESPIRATION RATE: 16 BRPM | OXYGEN SATURATION: 99 % | HEIGHT: 71 IN | BODY MASS INDEX: 25.48 KG/M2 | HEART RATE: 55 BPM | DIASTOLIC BLOOD PRESSURE: 74 MMHG | SYSTOLIC BLOOD PRESSURE: 118 MMHG

## 2021-08-26 DIAGNOSIS — I82.4Z1 ACUTE DEEP VEIN THROMBOSIS (DVT) OF DISTAL VEIN OF RIGHT LOWER EXTREMITY (H): ICD-10-CM

## 2021-08-26 LAB
ALBUMIN SERPL-MCNC: 4.1 G/DL (ref 3.4–5)
ALP SERPL-CCNC: 61 U/L (ref 40–150)
ALT SERPL W P-5'-P-CCNC: 44 U/L (ref 0–70)
ANION GAP SERPL CALCULATED.3IONS-SCNC: 3 MMOL/L (ref 3–14)
AST SERPL W P-5'-P-CCNC: 27 U/L (ref 0–45)
BILIRUB SERPL-MCNC: 0.3 MG/DL (ref 0.2–1.3)
BUN SERPL-MCNC: 21 MG/DL (ref 7–30)
CALCIUM SERPL-MCNC: 9.1 MG/DL (ref 8.5–10.1)
CHLORIDE BLD-SCNC: 108 MMOL/L (ref 94–109)
CO2 SERPL-SCNC: 29 MMOL/L (ref 20–32)
CREAT SERPL-MCNC: 1.24 MG/DL (ref 0.66–1.25)
GFR SERPL CREATININE-BSD FRML MDRD: 64 ML/MIN/1.73M2
GLUCOSE BLD-MCNC: 106 MG/DL (ref 70–99)
POTASSIUM BLD-SCNC: 4.4 MMOL/L (ref 3.4–5.3)
PROT SERPL-MCNC: 6.8 G/DL (ref 6.8–8.8)
SODIUM SERPL-SCNC: 140 MMOL/L (ref 133–144)

## 2021-08-26 PROCEDURE — 99214 OFFICE O/P EST MOD 30 MIN: CPT | Performed by: INTERNAL MEDICINE

## 2021-08-26 PROCEDURE — 93971 EXTREMITY STUDY: CPT | Mod: RT

## 2021-08-26 PROCEDURE — G0463 HOSPITAL OUTPT CLINIC VISIT: HCPCS | Mod: 25

## 2021-08-26 ASSESSMENT — MIFFLIN-ST. JEOR: SCORE: 1672.68

## 2021-08-26 NOTE — PROGRESS NOTES
"[facility]        Patient is here for a follow up  to discuss about US lower extremities results and D-dimer results.    Patient's condition is stable.    /74 (BP Location: Right arm, Patient Position: Chair, Cuff Size: Adult Regular)   Pulse 55   Resp 16   Ht 5' 11\" (1.803 m)   Wt 182 lb (82.6 kg)   SpO2 99%   BMI 25.38 kg/m      The provider has been notified that the patient has no concerns.     Questions patient would like addressed today are: N/A.    Refills are needed: N/A    Has homecare services and agency name:  Olivia Bowles CMA      "

## 2021-08-26 NOTE — PROGRESS NOTES
Good Petty is a 55 year old male who is presenting at the current time to discuss his diagnosi(es) of      Acute deep vein thrombosis (DVT) of distal vein of right lower extremity (H)  .           HPI:       Good Petty is a 55 year old male with history of intracerebral hemorrhage, stroke, hypertension, hyperlipidemia who presented to the ED 8/4/2020 for evaluation of DVT. Per chart review, the patient was admitted in 01/11 and transfer to the [facility] for acute to subacute intraparenchymal hematoma in the left paracentral lobule of unclear etiology on 01/15. He reported in the ED on 8/4/2020 that  he had some RLE swelling with limping for the prior 2 days. He reported that when he used it for walking he had leg pain but that laying/sitting down on the bed his pain was better almost gone. He had duplex as below notably positive for extensive RLE DVT form the SFV to the ankle.      His records were reviewed given that he had an intra cranial hemorrhage for an undetermined reason in January, 2020.  Originally the ED MD was hesitant to anticoagulate him. However, a head CT was unremarkable, and as such the decision was made to therapeutically AC him.  Lovenox and warfarin were chosen out of concern for reversibility.      He then represented to the ED 8/14/2020 with right leg pain. 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 He states he didn't get out of the car as much as he should have. He noted he had some pain in his right leg, which he rated as a 9/10 the night of 8/13 which decreased to a 2/10 by 8/14/2020.  U/S was repeated, which revealed improvement in thrombus.      He was seen by Dr. Nieto in August, who performed a HC w/u upon him. The patietn is not a Leiden or a prothrombin mutant. He has no APLAs. Protein C and S, ATIII were not drawn as he had acute thrombus. D dimer has now normalized. He was switched to  Xarelto and is tolerating that well without bleeding or bruising.      He has been ACd now for 12 months (at 10 mg daily for the last six months) and thrombus has remained. It now appears to be almost entirely occlusive, whereas six months ago it was partially occlusive. His d dimer remains normal. He has no sxs of impaired venous flow return up the leg, and is tolerating AC w/o bleeding, bruising, etc.     He presents today to consider cessation of AC.     Review Of Systems  Skin: negative  Eyes: negative  Ears/Nose/Throat: negative  Respiratory: No shortness of breath, dyspnea on exertion, cough, or hemoptysis  Cardiovascular: negative  Gastrointestinal: negative  Genitourinary: negative  Musculoskeletal: negative  Neurologic: negative  Psychiatric: negative  Hematologic/Lymphatic/Immunologic: negative  Endocrine: negative           PAST MEDICAL HISTORY:                  Past Medical History        Past Medical History:   Diagnosis Date     Anxiety       Prediabetes       Shoulder capsulitis, right              PAST SURGICAL HISTORY:                  Past Surgical History         Past Surgical History:   Procedure Laterality Date     HC TOOTH EXTRACTION W/FORCEP         VASECTOMY                CURRENT MEDICATIONS:                  Current Outpatient Prescriptions          Current Outpatient Medications   Medication Sig Dispense Refill     amLODIPine (NORVASC) 2.5 MG tablet TAKE 1 TABLET(2.5 MG) BY MOUTH DAILY 90 tablet 1     latanoprost (XALATAN) 0.005 % ophthalmic solution Place 1 drop into both eyes daily 1 Bottle 0     rivaroxaban ANTICOAGULANT (XARELTO ANTICOAGULANT) 20 MG TABS tablet Take 1 tablet (20 mg) by mouth daily (with dinner) 90 tablet 3     simvastatin (ZOCOR) 20 MG tablet TAKE 1 TABLET(20 MG) BY MOUTH EVERY EVENING 90 tablet 3            ALLERGIES:                No Known Allergies     SOCIAL HISTORY:                  Social History   Social History            Socioeconomic History     Marital  status:        Spouse name: Not on file     Number of children: Not on file     Years of education: Not on file     Highest education level: Not on file   Occupational History     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   Social Needs     Financial resource strain: Not on file     Food insecurity       Worry: Not on file       Inability: Not on file     Transportation needs       Medical: Not on file       Non-medical: Not on file   Tobacco Use     Smoking status: Never Smoker     Smokeless tobacco: Never Used   Substance and Sexual Activity     Alcohol use: Never       Frequency: Never     Drug use: Never     Sexual activity: Yes       Partners: Female   Lifestyle     Physical activity       Days per week: Not on file       Minutes per session: Not on file     Stress: Not on file   Relationships     Social connections       Talks on phone: Not on file       Gets together: Not on file       Attends Holiness service: Not on file       Active member of club or organization: Not on file       Attends meetings of clubs or organizations: Not on file       Relationship status: Not on file     Intimate partner violence       Fear of current or ex partner: Not on file       Emotionally abused: Not on file       Physically abused: Not on file       Forced sexual activity: Not on file   Other Topics Concern     Not on file   Social History Narrative     Not on file            FAMILY HISTORY:                   Family History         Family History   Problem Relation Age of Onset     Cerebrovascular Disease Maternal Grandmother                 Physical exam Reveals:     O/P: WNL  HEENT: WNL  NECK: No JVD, thyromegaly, or lymphadenopathy  HEART: RRR, no murmurs, gallops, or rubs  LUNGS: CTA bilaterally without rales, wheezes, or rhonchi  GI: NABS, nondistended, nontender, soft  EXT:without cyanosis, clubbing, or edema  NEURO: nonfocal  : no flank tenderness        Component      Latest Ref Rng & Units  8/18/2020 11/27/2020 2/12/2021 8/20/2021   D-Dimer      0.0 - 0.50 ug/ml FEU 0.7 (H) <0.3 <0.3    D-Dimer Quantitative      0.00 - 0.50 ug/mL FEU    <0.27        US RIGHT LOWER EXTREMITY VENOUS DUPLEX ULTRASOUND  2/12/2021 3:00 PM     CLINICAL HISTORY/INDICATION: Acute deep vein thrombosis (DVT) of  distal vein of right lower extremity (H).     COMPARISON: 11/2/2020     TECHNIQUE:   Grayscale, color-flow, and spectral waveform analysis were performed  of the deep veins of the right lower extremity     FINDINGS:   Nonocclusive deep vein thrombosis in the right popliteal and one of  the paired posterior tibial veins, unchanged. The right common right  external iliac, common femoral, femoral and peroneal veins are  compressible with spectral waveform color flow and augmentation.      The left external iliac vein was evaluated for comparison and is  normal.                                                                      IMPRESSION:   Nonocclusive chronic-appearing thrombus in the right popliteal and one  of the posterior tibial veins, not significantly changed when compared  to the prior study.     OK TSAI,             A/P:        (I82.4Z1) First lifetime VTE presenting w/o PE as RLE SFV to ankle DVT provoked in association with work as an [employer] .     Comment: Improving with therapeutic AC, provoked vocationally in association with driving as above. His d dimer is normal. His thrombus is improved from originally, but is unchanged in comparison to 11/2020. He continues to work as an [employer] , thereby placing him at increased risk of recurrence. When last seen on 2/23/2021, we maintained him  on AC but reduced the dose from 20 mg daily to 10 mg daily consistent with the drug's labelling for chronic treatment of DVT.    Plan:  He has been ACd now for 12 months (at 10 mg daily for the last six months) and thrombus has remained. It now appears to be almost entirely occlusive, whereas six months ago  it was partially occlusive. Although similar in size, it does in fact appear to be somewhat anatomically larger. All his family members had COVID recently. He had no sxs. His Vance Ab level is pending. One wonders if the minimal worsening was due to a subclinical COVID infection. His d dimer remains normal. He has no sxs of impaired venous flow return up the leg, and is tolerating AC w/o bleeding, bruising, etc.     Continue AC at higher 20 mg dose for six additional months given above enlargement. Repeat d dimer and RLE duplex at that time. Consider cessation of AC at that time based upon results. If  D dimer still normal but thrombus unchanged, this would likely by that time have reendothelialized, and as such , I would favor cessation of AC at that 18 month lilia if unchanged in the interim.

## 2021-08-27 LAB
SARS-COV-2 AB SERPL IA-ACNC: <0.4 U/ML
SARS-COV-2 AB SERPL-IMP: NEGATIVE

## 2021-10-24 ENCOUNTER — HEALTH MAINTENANCE LETTER (OUTPATIENT)
Age: 56
End: 2021-10-24

## 2022-01-12 ENCOUNTER — MYC MEDICAL ADVICE (OUTPATIENT)
Dept: OTHER | Facility: CLINIC | Age: 57
End: 2022-01-12
Payer: COMMERCIAL

## 2022-01-12 DIAGNOSIS — I82.4Z1 ACUTE DEEP VEIN THROMBOSIS (DVT) OF DISTAL VEIN OF RIGHT LOWER EXTREMITY (H): Primary | ICD-10-CM

## 2022-01-12 NOTE — TELEPHONE ENCOUNTER
Please contact patient to arrange for:      D-dimer (non-fasting lab)     RLE Venous US    In clinic visit with Nakia Quesada PA-C immediately following ultrasound    Patient has been instructed via return [employer] message to go to urgent care for assessment if symptoms worsen prior to his being seen at [facility].    Dasia Rivera RN BSN  [facility]  [phone]

## 2022-01-14 NOTE — TELEPHONE ENCOUNTER
Patient was seen 1/12/22 in ED.    Orders cancelled.  Dasia Rivera RN BSN  [facility]  [phone]

## 2022-02-13 ENCOUNTER — HEALTH MAINTENANCE LETTER (OUTPATIENT)
Age: 57
End: 2022-02-13

## 2022-03-18 NOTE — ADDENDUM NOTE
Encounter addended by: Karin Cavazos, PT on: 3/18/2022 8:55 AM   Actions taken: Clinical Note Signed, Episode resolved

## 2022-03-18 NOTE — PROGRESS NOTES
[facility]    Outpatient Physical Therapy Discharge Note  Patient: Good Petty  : 1965    Beginning/End Dates of Reporting Period:  20 to 3/18/20     Referring Provider: Jim Guzman MD          Therapy Diagnosis: Decreased safe functional mobility         Client Self Report: Patient present, motivated to get started. Reproting continued numbness through his R LE and difficulty with driving- doesn't feel that he knows where his leg is and his leg doesn't end up where he wants it.     Objective Measurements:  See below    Outcome Measures (most recent score):  See below    Goals:  Goal Identifier MET: FGA   Goal Description The patietn will score 25/30 or greater on FGA assessment to demonstrate a significant improvement in dynamic balance and to show that he is at a minimal risk of falling with particiaption in community based ambualtion.    Target Date 20   Date Met  20   Progress (detail required for progress note):       Goal Identifier MET: 30s STS   Goal Description The patient will score 13 or more STS transfers without use of UEs and while demonstrating controlled movement without compensation to demonstrate a significant improvement in functional LE strength and to show that he is at a minimal risk of falling.    Target Date 20   Date Met  20   Progress (detail required for progress note):       Goal Identifier MET: LBP   Goal Description The patient will report a 50% or greater improvement in the severity of back pain to demonstrate a significant improvement in symptom severity in order to facilitate improved mobility and activity tolerance.    Target Date 20   Date Met  20   Progress (detail required for progress note):       Goal Identifier HEP   Goal Description The patient will be IND in performanc eof HEP to facilitate continued gains in  strength, endurance, and balance outside of therpy.    Target Date 03/31/20   Date Met      Progress (detail required for progress note):       Goal Identifier NEW GOAL: sharpened rhomberg   Goal Description The patient will be able to maintain sharpened rhomberg stance x 30 seconds with arms crossed while demonstrating a normal degree of sway to demonstrate improved postural stability.   Target Date 03/31/20   Date Met      Progress (detail required for progress note):       Goal Identifier     Goal Description     Target Date     Date Met      Progress (detail required for progress note):       Goal Identifier     Goal Description     Target Date     Date Met      Progress (detail required for progress note):       Goal Identifier     Goal Description     Target Date     Date Met      Progress (detail required for progress note):         Plan:  Discharge from therapy.    Discharge:    Reason for Discharge: At most recent reassessment, patient had achieved 3/5 goals. Patient discontinuing participation in therapy prior to further reassessments. Had been making good progress. Will discharge at this time.     Equipment Issued: none    Discharge Plan: Patient to continue home program.

## 2022-04-18 DIAGNOSIS — I10 ESSENTIAL HYPERTENSION: ICD-10-CM

## 2022-04-18 NOTE — LETTER
[facility]  [address] [place] [facility] [address]  Phone: [phone]        April 21, 2022      Good Petty                                                                                                                                [address] [facility] [address]            Dear Mr. Petty,    We are concerned about your health care.  We recently provided you with a medication refill.  Many medications require routine follow-up with your Doctor.      At this time we ask that: You schedule a routine office visit with your physician to follow your hypertension     Your prescription: Has been refilled for 1 month so you may have time for the above noted follow-up.      Thank you,      [facility]

## 2022-04-18 NOTE — LETTER
April 21, 2022      Good Petty  [address] [place] [address]        Dear Jovanny,    We are writing to inform you of your test results.    {results letter list:[id number]}    No results found from the In Basket message.    If you have any questions or concerns, please call the clinic at the number listed above.       Sincerely,

## 2022-04-19 DIAGNOSIS — I10 ESSENTIAL HYPERTENSION: ICD-10-CM

## 2022-04-19 RX ORDER — AMLODIPINE BESYLATE 2.5 MG/1
2.5 TABLET ORAL DAILY
Qty: 30 TABLET | Refills: 0 | Status: SHIPPED | OUTPATIENT
Start: 2022-04-19 | End: 2022-05-27

## 2022-04-19 NOTE — TELEPHONE ENCOUNTER
Last seen more than 6 months ago, patient needs in clinic appointment, I have refilled for 1 month only, please assist patient in scheduling appointment

## 2022-04-19 NOTE — TELEPHONE ENCOUNTER
Pending Prescriptions:                       Disp   Refills    amLODIPine (NORVASC) 2.5 MG tablet         90 tab*1        Sig: Take 1 tablet (2.5 mg) by mouth daily    Routing refill request to provider for review/approval because:  Drug interaction warning        Raegan GUZMÁN RN   [facility]

## 2022-04-20 RX ORDER — AMLODIPINE BESYLATE 2.5 MG/1
TABLET ORAL
Qty: 90 TABLET | OUTPATIENT
Start: 2022-04-20

## 2022-04-20 NOTE — TELEPHONE ENCOUNTER
"Refused Prescriptions:                       Disp   Refills    amLODIPine (NORVASC) 2.5 MG tablet [Pharma*90 tab*         Sig: TAKE 1 TABLET(2.5 MG) BY MOUTH DAILY      From provider: \"Last seen more than 6 months ago, patient needs in clinic appointment, I have refilled for 1 month only\"      Raegan GUZMÁN RN   [facility]    "

## 2022-05-12 ENCOUNTER — LAB (OUTPATIENT)
Dept: LAB | Facility: CLINIC | Age: 57
End: 2022-05-12
Payer: COMMERCIAL

## 2022-05-12 DIAGNOSIS — I82.4Z1 ACUTE DEEP VEIN THROMBOSIS (DVT) OF DISTAL VEIN OF RIGHT LOWER EXTREMITY (H): ICD-10-CM

## 2022-05-12 PROCEDURE — 85379 FIBRIN DEGRADATION QUANT: CPT

## 2022-05-12 PROCEDURE — 36415 COLL VENOUS BLD VENIPUNCTURE: CPT

## 2022-05-13 LAB — D DIMER PPP FEU-MCNC: <0.27 UG/ML FEU (ref 0–0.5)

## 2022-05-16 ENCOUNTER — HOSPITAL ENCOUNTER (OUTPATIENT)
Dept: ULTRASOUND IMAGING | Facility: CLINIC | Age: 57
Discharge: HOME | End: 2022-05-16
Attending: INTERNAL MEDICINE
Payer: COMMERCIAL

## 2022-05-16 ENCOUNTER — OFFICE VISIT (OUTPATIENT)
Dept: OTHER | Facility: CLINIC | Age: 57
End: 2022-05-16
Attending: INTERNAL MEDICINE
Payer: COMMERCIAL

## 2022-05-16 VITALS — OXYGEN SATURATION: 99 % | DIASTOLIC BLOOD PRESSURE: 78 MMHG | SYSTOLIC BLOOD PRESSURE: 122 MMHG | HEART RATE: 58 BPM

## 2022-05-16 DIAGNOSIS — I82.4Z1 ACUTE DEEP VEIN THROMBOSIS (DVT) OF DISTAL VEIN OF RIGHT LOWER EXTREMITY (H): ICD-10-CM

## 2022-05-16 PROCEDURE — 93971 EXTREMITY STUDY: CPT | Mod: RT

## 2022-05-16 PROCEDURE — 99214 OFFICE O/P EST MOD 30 MIN: CPT | Performed by: INTERNAL MEDICINE

## 2022-05-16 PROCEDURE — G0463 HOSPITAL OUTPT CLINIC VISIT: HCPCS

## 2022-05-16 NOTE — PROGRESS NOTES
Good Petty is a 56 year old male who is presenting at the current time to discuss his diagnosi(es) of       Acute deep vein thrombosis (DVT) of distal vein of right lower extremity (H)            HPI:       Good Petty is a 56 year old male with history of intracerebral hemorrhage, stroke, hypertension, hyperlipidemia who presented to the ED 8/4/2020 for evaluation of DVT. Per chart review, the patient was admitted on 01/11/20 and transferred to the [facility] for acute to subacute intraparenchymal hematoma in the left paracentral lobule of unclear etiology on 01/15. He reported in the ED on 8/4/2020 that  he had some RLE swelling with limping for the prior 2 days. He reported that when he used it for walking he had leg pain but that laying/sitting down on the bed his pain was better almost gone. He had duplex as below notably positive for extensive RLE DVT form the SFV to the ankle.      His records were reviewed given that he had an intra cranial hemorrhage for an undetermined reason in January, 2020.  Originally the ED MD was hesitant to anticoagulate him. However, a head CT was unremarkable, and as such the decision was made to therapeutically AC him.  Lovenox and warfarin were chosen out of concern for reversibility.      He then represented to the ED 8/14/2020 with right leg pain. 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 He states he didn't get out of the car as much as he should have. He noted he had some pain in his right leg, which he rated as a 9/10 the night of 8/13 which decreased to a 2/10 by 8/14/2020.  U/S was repeated, which revealed improvement in thrombus.      He was seen by Dr. Nieto in August, who performed a HC w/u upon him. The patietn is not a Leiden or a prothrombin mutant. He has no APLAs. Protein C and S, ATIII were not drawn as he had acute thrombus. D dimer has now normalized. He was  switched to Xarelto and is tolerating that well without bleeding or bruising.      He has been ACd now for 21 months (at 10 mg daily for the last 12 months) and thrombus has remained. It now appears to be almost entirely occlusive, whereas six months ago it was partially occlusive. His d dimer remains normal. He has no sxs of impaired venous flow return up the leg, and is tolerating AC w/o bleeding, bruising, etc.              He presents today to consider cessation of AC. His d diemr remains undetectable. Nonocclusive (as opposed to previously occlusive)  DVT in the distal femoral and popliteal veins,         in similar distribution to previous exam. No suggestion of acute occlusive DVT.      Review Of Systems  Skin: negative  Eyes: negative  Ears/Nose/Throat: negative  Respiratory: No shortness of breath, dyspnea on exertion, cough, or hemoptysis  Cardiovascular: negative  Gastrointestinal: negative  Genitourinary: negative  Musculoskeletal: negative  Neurologic: negative  Psychiatric: negative  Hematologic/Lymphatic/Immunologic: negative  Endocrine: negative      PAST MEDICAL HISTORY:                  Past Medical History:   Diagnosis Date     Anxiety      Prediabetes      Shoulder capsulitis, right        PAST SURGICAL HISTORY:                  Past Surgical History:   Procedure Laterality Date     HC TOOTH EXTRACTION W/FORCEP       VASECTOMY         CURRENT MEDICATIONS:                  Current Outpatient Medications   Medication Sig Dispense Refill     amLODIPine (NORVASC) 2.5 MG tablet Take 1 tablet (2.5 mg) by mouth daily 30 tablet 0     latanoprost (XALATAN) 0.005 % ophthalmic solution Place 1 drop into both eyes daily 1 Bottle 0     rivaroxaban ANTICOAGULANT (XARELTO ANTICOAGULANT) 20 MG TABS tablet Take 1 tablet (20 mg) by mouth daily (with dinner) 90 tablet 3     simvastatin (ZOCOR) 20 MG tablet TAKE 1 TABLET(20 MG) BY MOUTH EVERY EVENING 90 tablet 3       ALLERGIES:                No Known  Allergies    SOCIAL HISTORY:                  Social History     Socioeconomic History     Marital status:      Spouse name: Not on file     Number of children: Not on file     Years of education: Not on file     Highest education level: Not on file   Occupational History     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   Tobacco Use     Smoking status: Never Smoker     Smokeless tobacco: Never Used   Substance and Sexual Activity     Alcohol use: Never     Drug use: Never     Sexual activity: Yes     Partners: Female   Other Topics Concern     Not on file   Social History Narrative     Not on file     Social Determinants of Health     Financial Resource Strain: Not on file   Food Insecurity: Not on file   Transportation Needs: Not on file   Physical Activity: Not on file   Stress: Not on file   Social Connections: Not on file   Intimate Partner Violence: Not on file   Housing Stability: Not on file       FAMILY HISTORY:                   Family History   Problem Relation Age of Onset     Cerebrovascular Disease Maternal Grandmother        Physical exam Reveals:    O/P: WNL  HEENT: WNL  NECK: No JVD, thyromegaly, or lymphadenopathy  HEART: RRR, no murmurs, gallops, or rubs  LUNGS: CTA bilaterally without rales, wheezes, or rhonchi  GI: NABS, nondistended, nontender, soft  EXT:without cyanosis, clubbing, or edema  NEURO: nonfocal  : no flank tenderness        Component      Latest Ref Rng & Units 8/18/2020 11/27/2020 2/12/2021 8/20/2021   D-Dimer      0.0 - 0.50 ug/ml FEU 0.7 (H) <0.3 <0.3     D-Dimer Quantitative      0.00 - 0.50 ug/mL FEU       <0.27      Component      Latest Ref Rng & Units 5/12/2022   D-Dimer Quantitative      0.00 - 0.50 ug/mL FEU <0.27       VENOUS ULTRASOUND RIGHT LOWER EXTREMITY  5/16/2022 2:35 PM      HISTORY: Acute deep vein thrombosis (DVT) of distal vein of right  lower extremity (H).     COMPARISON: August 26, 2021     TECHNIQUE: Color Doppler and spectral waveform  analysis performed  throughout the deep veins of the right lower extremity.     FINDINGS: The right external iliac, common femoral, proximal greater  saphenous, and proximal mid segments of the femoral vein demonstrated  normal blood flow, compression, and augmentation. Nonocclusive  thrombus of the distal femoral vein, extending into the popliteal  vein. Posterior tibial and peroneal veins are compressible.  Contralateral left common femoral vein is patent.                                                                      IMPRESSION: Nonocclusive DVT in the distal femoral and popliteal  veins, likely similar distribution to previous exam. No suggestion of  acute occlusive DVT.     US RIGHT LOWER EXTREMITY VENOUS DUPLEX ULTRASOUND  2/12/2021 3:00 PM     CLINICAL HISTORY/INDICATION: Acute deep vein thrombosis (DVT) of  distal vein of right lower extremity (H).     COMPARISON: 11/2/2020     TECHNIQUE:   Grayscale, color-flow, and spectral waveform analysis were performed  of the deep veins of the right lower extremity     FINDINGS:   Nonocclusive deep vein thrombosis in the right popliteal and one of  the paired posterior tibial veins, unchanged. The right common right  external iliac, common femoral, femoral and peroneal veins are  compressible with spectral waveform color flow and augmentation.      The left external iliac vein was evaluated for comparison and is  normal.                                                                      IMPRESSION:   Nonocclusive chronic-appearing thrombus in the right popliteal and one  of the posterior tibial veins, not significantly changed when compared  to the prior study.     OK TSAI,                 A/P:        (I82.4Z1) First lifetime VTE presenting w/o PE as RLE SFV to ankle DVT provoked in association with work as an [employer] .      Comment: Improving with therapeutic AC, provoked vocationally in association with driving as above. His d dimer is normal.  His thrombus is improved from originally, as well as his last imaging. He continues to work as an [employer] , thereby placing him at increased risk of recurrence. When seen on 2/23/2021, we maintained him  on AC but reduced the dose from 20 mg daily to 10 mg daily consistent with the drug's labelling for chronic treatment of DVT. On that dose a previously nonocclusive DVT became occlusive. We increased the dose back to 20 mg daily.  He has been ACd now for 21 months (at 10 mg daily for six of those months) and thrombus has remained. It now appears to be partially occlusive, whereas six months ago it was occlusive. His d dimer remains normal. He has sxs of impaired venous flow return up the leg. With swelling and tightness of the RLE, and is tolerating AC w/o bleeding, bruising, etc.      Continue AC at higher 20 mg dose for six additional months given above change to partially occlusive. Repeat d dimer and RLE duplex at that time. Consider cessation of AC at that time based upon results. If  D dimer still normal but thrombus unchanged, this would likely by that time have reendothelialized, and as such , I would favor cessation of AC at that point in the future if unchanged in the interim.       30 minutes total medical care on today's date.

## 2022-05-16 NOTE — PROGRESS NOTES
Patient is here to discuss follow up.    /78 (BP Location: Right arm, Patient Position: Chair, Cuff Size: Adult Large)   Pulse 58   SpO2 99%     Questions patient would like addressed today are: Patient has questions about his previous clots.    Refills are needed: Yes:  Amlodipine     Has homecare services and agency name:  [facility]

## 2022-05-24 DIAGNOSIS — I10 ESSENTIAL HYPERTENSION: ICD-10-CM

## 2022-05-27 ENCOUNTER — MYC MEDICAL ADVICE (OUTPATIENT)
Dept: INTERNAL MEDICINE | Facility: CLINIC | Age: 57
End: 2022-05-27
Payer: COMMERCIAL

## 2022-05-27 DIAGNOSIS — I10 ESSENTIAL HYPERTENSION: ICD-10-CM

## 2022-05-27 RX ORDER — AMLODIPINE BESYLATE 2.5 MG/1
TABLET ORAL
Qty: 30 TABLET | Refills: 0 | OUTPATIENT
Start: 2022-05-27

## 2022-05-27 RX ORDER — AMLODIPINE BESYLATE 2.5 MG/1
TABLET ORAL
Qty: 90 TABLET | OUTPATIENT
Start: 2022-05-27

## 2022-06-06 ENCOUNTER — MYC MEDICAL ADVICE (OUTPATIENT)
Dept: INTERNAL MEDICINE | Facility: CLINIC | Age: 57
End: 2022-06-06
Payer: COMMERCIAL

## 2022-06-08 DIAGNOSIS — I10 ESSENTIAL HYPERTENSION: ICD-10-CM

## 2022-06-08 RX ORDER — AMLODIPINE BESYLATE 2.5 MG/1
2.5 TABLET ORAL DAILY
Qty: 30 TABLET | Refills: 0 | Status: CANCELLED | OUTPATIENT
Start: 2022-06-08

## 2022-06-08 NOTE — TELEPHONE ENCOUNTER
We canceled his appointment today because Dr Trotter was out. He will need more amlodipine to las until his next appointment which is 8/1/22.  Please put a start date of 6/21/22 as he has about 15 pills left right now.

## 2022-09-06 ENCOUNTER — OFFICE VISIT (OUTPATIENT)
Dept: INTERNAL MEDICINE | Facility: CLINIC | Age: 57
End: 2022-09-06
Payer: COMMERCIAL

## 2022-09-06 VITALS
DIASTOLIC BLOOD PRESSURE: 70 MMHG | HEART RATE: 92 BPM | WEIGHT: 180.5 LBS | RESPIRATION RATE: 12 BRPM | OXYGEN SATURATION: 97 % | BODY MASS INDEX: 25.27 KG/M2 | TEMPERATURE: 97.2 F | SYSTOLIC BLOOD PRESSURE: 112 MMHG | HEIGHT: 71 IN

## 2022-09-06 DIAGNOSIS — Z12.11 SCREEN FOR COLON CANCER: ICD-10-CM

## 2022-09-06 DIAGNOSIS — R73.03 PREDIABETES: ICD-10-CM

## 2022-09-06 DIAGNOSIS — E78.2 MIXED HYPERLIPIDEMIA: ICD-10-CM

## 2022-09-06 DIAGNOSIS — I61.8 OTHER NONTRAUMATIC INTRACEREBRAL HEMORRHAGE, UNSPECIFIED LATERALITY (H): ICD-10-CM

## 2022-09-06 DIAGNOSIS — Z00.00 ROUTINE HISTORY AND PHYSICAL EXAMINATION OF ADULT: Primary | ICD-10-CM

## 2022-09-06 DIAGNOSIS — I10 ESSENTIAL HYPERTENSION: ICD-10-CM

## 2022-09-06 DIAGNOSIS — Z12.5 SCREENING FOR PROSTATE CANCER: ICD-10-CM

## 2022-09-06 PROBLEM — S06.320A INTRAPARENCHYMAL HEMATOMA OF BRAIN, LEFT, WITHOUT LOSS OF CONSCIOUSNESS, INITIAL ENCOUNTER (H): Status: RESOLVED | Noted: 2021-01-29 | Resolved: 2022-09-06

## 2022-09-06 LAB
HBA1C MFR BLD: 5.8 % (ref 0–5.6)
HGB BLD-MCNC: 14.7 G/DL (ref 13.3–17.7)

## 2022-09-06 PROCEDURE — 99396 PREV VISIT EST AGE 40-64: CPT | Performed by: INTERNAL MEDICINE

## 2022-09-06 PROCEDURE — G0103 PSA SCREENING: HCPCS | Performed by: INTERNAL MEDICINE

## 2022-09-06 PROCEDURE — 36415 COLL VENOUS BLD VENIPUNCTURE: CPT | Performed by: INTERNAL MEDICINE

## 2022-09-06 PROCEDURE — 80053 COMPREHEN METABOLIC PANEL: CPT | Performed by: INTERNAL MEDICINE

## 2022-09-06 PROCEDURE — 99214 OFFICE O/P EST MOD 30 MIN: CPT | Mod: 25 | Performed by: INTERNAL MEDICINE

## 2022-09-06 PROCEDURE — 83036 HEMOGLOBIN GLYCOSYLATED A1C: CPT | Performed by: INTERNAL MEDICINE

## 2022-09-06 PROCEDURE — 85018 HEMOGLOBIN: CPT | Performed by: INTERNAL MEDICINE

## 2022-09-06 PROCEDURE — 80061 LIPID PANEL: CPT | Performed by: INTERNAL MEDICINE

## 2022-09-06 RX ORDER — AMLODIPINE BESYLATE 2.5 MG/1
2.5 TABLET ORAL DAILY
Qty: 90 TABLET | Refills: 1 | Status: SHIPPED | OUTPATIENT
Start: 2022-09-06 | End: 2023-04-10

## 2022-09-06 RX ORDER — SIMVASTATIN 20 MG
TABLET ORAL
Qty: 90 TABLET | Refills: 3 | Status: SHIPPED | OUTPATIENT
Start: 2022-09-06 | End: 2023-11-13

## 2022-09-06 ASSESSMENT — ENCOUNTER SYMPTOMS
CONSTIPATION: 0
WEAKNESS: 0
HEMATURIA: 0
PARESTHESIAS: 0
DIARRHEA: 0
JOINT SWELLING: 0
DYSURIA: 0
FEVER: 0
CHILLS: 0
SHORTNESS OF BREATH: 0
HEMATOCHEZIA: 0
PALPITATIONS: 0
HEADACHES: 0
SORE THROAT: 0
EYE PAIN: 0
COUGH: 0
ARTHRALGIAS: 0
DIZZINESS: 0
MYALGIAS: 0
ABDOMINAL PAIN: 0
HEARTBURN: 0
NERVOUS/ANXIOUS: 0
NAUSEA: 0
FREQUENCY: 0

## 2022-09-06 NOTE — PROGRESS NOTES
SUBJECTIVE:   CC: Good Petty is an 57 year old male who presents for preventative health visit.       Patient has been advised of split billing requirements and indicates understanding: Yes  Healthy Habits:     Getting at least 3 servings of Calcium per day:  Yes    Bi-annual eye exam:  Yes    Dental care twice a year:  Yes    Sleep apnea or symptoms of sleep apnea:  Excessive snoring    Diet:  Low salt    Frequency of exercise:  4-5 days/week    Duration of exercise:  30-45 minutes    Taking medications regularly:  Yes    Medication side effects:  None    PHQ-2 Total Score: 0    Additional concerns today:  Yes    Hyperlipidemia Follow-Up       Are you regularly taking any medication or supplement to lower your cholesterol?   Yes- simvastatin     Are you having muscle aches or other side effects that you think could be caused by your cholesterol lowering medication?  No     Hypertension Follow-up       Do you check your blood pressure regularly outside of the clinic? No     Are you following a low salt diet? Yes    Are your blood pressures ever more than 140 on the top number (systolic) OR more       than 90 on the bottom number (diastolic), for example 140/90? No     Cerebrovascular Follow-up       Patient history: hemorrhagic cerebrovascular incident-      Residual symptoms: None,but says rt leg is not completely baseline( also had rt DVT) , pt is requesting handicap parking permit form filled.     Worsened or new symptoms since last visit: No    Daily aspirin use:   --on Xarelto     Hypertension controlled: Yes     H/o Rt LE DVT; on xarelto and follows up with vascular.       Today's PHQ-2 Score:   PHQ-2 ( 1999 [employer]) 9/6/2022   Q1: Little interest or pleasure in doing things 0   Q2: Feeling down, depressed or hopeless 0   PHQ-2 Score 0   PHQ-2 Total Score (12-17 Years)- Positive if 3 or more points; Administer PHQ-A if positive -   Q1: Little interest or pleasure in doing things Not at all   Q2: Feeling  down, depressed or hopeless Not at all   PHQ-2 Score 0       Abuse: Current or Past(Physical, Sexual or Emotional)- No  Do you feel safe in your environment? Yes    Have you ever done Advance Care Planning? (For example, a Health Directive, POLST, or a discussion with a medical provider or your loved ones about your wishes): No, advance care planning information given to patient to review.  Advanced care planning was discussed at today's visit.      Past Medical History:   Diagnosis Date     Anxiety      Prediabetes      Shoulder capsulitis, right        Patient Active Problem List   Diagnosis     ICH (intracerebral hemorrhage) (H)     Stroke (H)     Essential hypertension     Prediabetes     Mixed hyperlipidemia     Acute deep vein thrombosis (DVT) of distal vein of right lower extremity (H)       Past Surgical History:   Procedure Laterality Date     HC TOOTH EXTRACTION W/FORCEP       VASECTOMY         Current Outpatient Medications   Medication Sig Dispense Refill     amLODIPine (NORVASC) 2.5 MG tablet TAKE 1 TABLET(2.5 MG) BY MOUTH DAILY 90 tablet 0     latanoprost (XALATAN) 0.005 % ophthalmic solution Place 1 drop into both eyes daily 1 Bottle 0     rivaroxaban ANTICOAGULANT (XARELTO ANTICOAGULANT) 20 MG TABS tablet Take 1 tablet (20 mg) by mouth daily (with dinner) 90 tablet 1     simvastatin (ZOCOR) 20 MG tablet TAKE 1 TABLET(20 MG) BY MOUTH EVERY EVENING 90 tablet 0       Family History   Problem Relation Age of Onset     Cerebrovascular Disease Maternal Grandmother        Social History     Tobacco Use     Smoking status: Never Smoker     Smokeless tobacco: Never Used   Substance Use Topics     Alcohol use: Never     If you drink alcohol do you typically have >3 drinks per day or >7 drinks per week? No    Alcohol Use 9/6/2022   Prescreen: >3 drinks/day or >7 drinks/week? Not Applicable   No flowsheet data found.    Last PSA: No results found for: PSA    Reviewed orders with patient. Reviewed health  "maintenance and updated orders accordingly - Yes     Reviewed and updated as needed this visit by clinical staff   Tobacco     Med Hx  Surg Hx  Fam Hx  Soc Hx          Reviewed and updated as needed this visit by Provider                    Review of Systems   Constitutional: Negative for chills and fever.   HENT: Negative for congestion, ear pain, hearing loss and sore throat.    Eyes: Negative for pain and visual disturbance.   Respiratory: Negative for cough and shortness of breath.    Cardiovascular: Negative for chest pain, palpitations and peripheral edema.   Gastrointestinal: Negative for abdominal pain, constipation, diarrhea, heartburn, hematochezia and nausea.   Genitourinary: Negative for dysuria, frequency, genital sores, hematuria, impotence, penile discharge and urgency.   Musculoskeletal: Negative for arthralgias, joint swelling and myalgias.   Neurological: Negative for dizziness, weakness, headaches and paresthesias.   Psychiatric/Behavioral: Negative for mood changes. The patient is not nervous/anxious.         OBJECTIVE:   /70   Pulse 92   Temp 97.2  F (36.2  C) (Tympanic)   Resp 12   Ht 1.803 m (5' 11\")   Wt 81.9 kg (180 lb 8 oz)   SpO2 97%   BMI 25.17 kg/m      Physical Exam  GENERAL:   alert and no distress  EYES: Eyes grossly normal to inspection, PERRL and conjunctivae and sclerae normal  RESP: lungs clear to auscultation - no rales, rhonchi or wheezes  CV: regular rate and rhythm, normal S1 S2,    ABDOMEN: soft, nontender, no hepatosplenomegaly, no masses and bowel sounds normal  MS: Rt LE - has compression socks, no calf tenderness, lt LE -no edema , no calf tenderness   NEURO: Normal strength and tone, mentation intact and speech normal  PSYCH: mentation appears normal, affect normal/bright    ASSESSMENT/PLAN:     (Z00.00) Routine history and physical examination of adult  (primary encounter diagnosis)  Plan: Comprehensive metabolic panel, Lipid panel         reflex to " "direct LDL Fasting, Hemoglobin        (I10) Essential hypertension  Comment: BP stable   Plan: amLODIPine (NORVASC) 2.5 MG tablet refilled.explained clearly about the medication,insructions and side effects.     (E78.2) Mixed hyperlipidemia  Plan: simvastatin (ZOCOR) 20 MG tablet refilled.explained clearly about the medication,insructions and side effects.       (R73.03) Prediabetes  Plan: Hemoglobin A1c             (I61.8) Other nontraumatic intracerebral hemorrhage, unspecified laterality (H)  Plan: simvastatin (ZOCOR) 20 MG tablet refilled.explained clearly about the medication,insructions and side effects.      (Z12.11) Screen for colon cancer  Plan: Adult GI  Referral - Procedure Only            (Z12.5) Screening for prostate cancer  Plan: Prostate Specific Antigen Screen            H/o Rt LE DVT - follows up with vascular and is on Xarelto             Patient has been advised of split billing requirements and indicates understanding: Yes    COUNSELING:   Reviewed preventive health counseling, as reflected in patient instructions       Regular exercise       Healthy diet/nutrition    Estimated body mass index is 25.17 kg/m  as calculated from the following:    Height as of this encounter: 1.803 m (5' 11\").    Weight as of this encounter: 81.9 kg (180 lb 8 oz).     Weight management plan: Discussed healthy diet and exercise guidelines    He reports that he has never smoked. He has never used smokeless tobacco.      Counseling Resources:  ATP IV Guidelines  Pooled Cohorts Equation Calculator  FRAX Risk Assessment  ICSI Preventive Guidelines  Dietary Guidelines for Americans, 2010  USDA's MyPlate  ASA Prophylaxis  Lung CA Screening    Radha Trotter MD  [facility]  "

## 2022-09-06 NOTE — NURSING NOTE
"/70   Pulse 92   Temp 97.2  F (36.2  C) (Tympanic)   Resp 12   Ht 1.803 m (5' 11\")   Wt 81.9 kg (180 lb 8 oz)   SpO2 97%   BMI 25.17 kg/m    Patient in for Male Px.  "

## 2022-09-07 LAB
ALBUMIN SERPL-MCNC: 4.1 G/DL (ref 3.4–5)
ALP SERPL-CCNC: 61 U/L (ref 40–150)
ALT SERPL W P-5'-P-CCNC: 30 U/L (ref 0–70)
ANION GAP SERPL CALCULATED.3IONS-SCNC: 5 MMOL/L (ref 3–14)
AST SERPL W P-5'-P-CCNC: 26 U/L (ref 0–45)
BILIRUB SERPL-MCNC: 0.4 MG/DL (ref 0.2–1.3)
BUN SERPL-MCNC: 25 MG/DL (ref 7–30)
CALCIUM SERPL-MCNC: 9.1 MG/DL (ref 8.5–10.1)
CHLORIDE BLD-SCNC: 107 MMOL/L (ref 94–109)
CHOLEST SERPL-MCNC: 153 MG/DL
CO2 SERPL-SCNC: 29 MMOL/L (ref 20–32)
CREAT SERPL-MCNC: 1.08 MG/DL (ref 0.66–1.25)
FASTING STATUS PATIENT QL REPORTED: NO
GFR SERPL CREATININE-BSD FRML MDRD: 80 ML/MIN/1.73M2
GLUCOSE BLD-MCNC: 94 MG/DL (ref 70–99)
HDLC SERPL-MCNC: 59 MG/DL
LDLC SERPL CALC-MCNC: 87 MG/DL
NONHDLC SERPL-MCNC: 94 MG/DL
POTASSIUM BLD-SCNC: 3.6 MMOL/L (ref 3.4–5.3)
PROT SERPL-MCNC: 7 G/DL (ref 6.8–8.8)
PSA SERPL-MCNC: 0.86 UG/L (ref 0–4)
SODIUM SERPL-SCNC: 141 MMOL/L (ref 133–144)
TRIGL SERPL-MCNC: 35 MG/DL

## 2022-09-08 ENCOUNTER — TELEPHONE (OUTPATIENT)
Dept: INTERNAL MEDICINE | Facility: CLINIC | Age: 57
End: 2022-09-08

## 2022-09-08 DIAGNOSIS — I82.4Z1 ACUTE DEEP VEIN THROMBOSIS (DVT) OF DISTAL VEIN OF RIGHT LOWER EXTREMITY (H): ICD-10-CM

## 2022-09-08 RX ORDER — RIVAROXABAN 20 MG/1
TABLET, FILM COATED ORAL
Qty: 90 TABLET | Refills: 1 | Status: SHIPPED | OUTPATIENT
Start: 2022-09-08 | End: 2023-11-30

## 2022-09-08 NOTE — TELEPHONE ENCOUNTER
rivaroxaban ANTICOAGULANT (XARELTO ANTICOAGULANT) 20 MG TABS tablet  Last Written Prescription Date:  5/16/22  Last Fill Quantity: 90,  # refills: 1     Last office visit: 5/16/2022   Follow up recommended:  November 2022    Unable to fill per [facility] protocol.  Medication and pharmacy loaded.     Direct Oral Anticoagulant Agents Failed 09/08/2022 03:17 AM   Protocol Details  Normal Platelets on file in past 12 months    Creatinine Clearance greater than 50 ml/min on file in past 3 mos

## 2022-09-15 ENCOUNTER — TELEPHONE (OUTPATIENT)
Dept: INTERNAL MEDICINE | Facility: CLINIC | Age: 57
End: 2022-09-15

## 2022-09-15 NOTE — TELEPHONE ENCOUNTER
[facility] Authorization; received via fax. Orders/Forms in your mailbox to be signed.    Please fax and call patient for .   Fax#: [phone]   Phone#: [phone]

## 2022-09-19 ENCOUNTER — TELEPHONE (OUTPATIENT)
Dept: GASTROENTEROLOGY | Facility: CLINIC | Age: 57
End: 2022-09-19

## 2022-09-19 NOTE — CONFIDENTIAL NOTE
Screening Questions  BLUE  KIND OF PREP RED  LOCATION [review exclusion criteria] GREEN  SEDATION TYPE        yes Are you active on mychart?       [place] Ordering/Referring Provider?        [employer] What type of coverage do you have?      No  Have you had a positive covid test in the last 90 days?     1. 25.1 BMI  [BMI 40+ - review exclusion criteria]    2. Yes   Are you able to give consent for your medical care? [IF NO,RN REVIEW]        3. no  Are you taking any prescription pain medications on a routine schedule?        3a.  EXTENDED PREP What kind of prescription?   4. No  Do you have any chemical dependencies such as alcohol, street drugs, or methadone?    5. No  Do you have any history of post-traumatic stress syndrome, severe anxiety or history of psychosis?      **If yes 3- 5 , please schedule with MAC sedation.**    BMI OVER 40 NEED PAC EVALUATION FOR [facility]          IF YES TO ANY 6 - 10 - HOSPITAL SETTING ONLY.     6.   No  Do you need assistance transferring?     7.   No  Have you had a heart or lung transplant?    8.   No  Are you currently on dialysis?   9.   No  Do you use daily home oxygen?   10. No  Do you take nitroglycerin?   10a.  If yes, how often?     11. [FEMALES]   Are you currently pregnant?    11a.  If yes, how many weeks? [ Greater than 12 weeks, OR NEEDED]    12. No  Do you have Pulmonary Hypertension? *NEED PAC APPT AT [facility]*     13. No  [review exclusion criteria]  Do you have any implantable devices in your body (pacemaker, defib, LVAD)?    14. No  In the past 6 months, have you had any heart related issues including cardiomyopathy or heart attack?     14a.  If yes, did it require cardiac stenting if so when?     15. No  Have you had a stroke or Transient ischemic attack (TIA - aka  mini stroke ) within 6 months?      16. No  Do you have mod to severe Obstructive Sleep Apnea?  [Hospital only - Ok at [place]]    17. No  Do you have SEVERE AND UNCONTROLLED asthma? *NEED PAC APPT AT  "[facility]*     18.YES Are you currently taking any blood thinners?     18a. If yes, inform patient to \"follow up w/ ordering provider for bridging instructions.\"    19. No  Do you take the medication Phentermine?    19a. If yes, \"Hold for 7 days before procedure.  Please consult your prescribing provider if you have questions about holding this medication.\"     20. No   Do you have chronic kidney disease?      21. No   Do you have a diagnosis of diabetes?     22. No  On a regular basis do you go 3-5 days between bowel movements?     23.  Preferred LOCAL Pharmacy for Pre Prescription    [ LIST ONLY ONE PHARMACY]     [id number] [facility] [address] [address] [facility]        - CLOSING REMINDERS -    Informed patient they will need an adult    Cannot take any type of public or medical transportation alone    Conscious Sedation- Needs  for 6 hours after the procedure       MAC/General-Needs  for 24 hours after procedure    Pre-Procedure Covid test to be completed [[facility] PCR Testing Required]    Confirmed Nurse will call to complete assessment       - SCHEDULING DETAILS -     Michel  Surgeon    10/19/2022  Date of Procedure  Lower Endoscopy [Colonoscopy]  Type of Procedure Scheduled    Location  [facility] PREP-If you answer yes to questions #8, #20, #21Which Colonoscopy Prep was Sent?     Moderation Sedation Type     No  PAC / Pre-op Required         Additional comments:  no          "

## 2022-09-21 ENCOUNTER — MYC MEDICAL ADVICE (OUTPATIENT)
Dept: OTHER | Facility: CLINIC | Age: 57
End: 2022-09-21

## 2022-09-21 NOTE — TELEPHONE ENCOUNTER
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  When dose was reduced to 10 mg daily, previously nonocclusive DVT became occlusive.  Dose was then changed back to 20 mg daily, to be continued until November 2022 when repeat d-dimer and duplex will be done to see if dose can be reduced.    Last office visit: 5/16/22    Medication and dose:  Xarelto 20 mg daily    Prescribed by: Dr. Boston    Procedure/Date: colonoscopy 10/19/22    Requested hold: requesting recommendation for Xarelto hold      
He can hold Xarelto for three days prior to colonosocpy. Resume ASAP postprocedurally.  
No

## 2022-09-21 NOTE — TELEPHONE ENCOUNTER
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    History of DVT, patient follows up with vascular and if he is still has physical restrictions patient needs to talk to his vascular specialist regarding this to fill his form.  Please advise patient

## 2022-09-26 ENCOUNTER — MYC MEDICAL ADVICE (OUTPATIENT)
Dept: INTERNAL MEDICINE | Facility: CLINIC | Age: 57
End: 2022-09-26

## 2022-09-27 ENCOUNTER — MEDICAL CORRESPONDENCE (OUTPATIENT)
Dept: HEALTH INFORMATION MANAGEMENT | Facility: CLINIC | Age: 57
End: 2022-09-27

## 2022-09-27 NOTE — TELEPHONE ENCOUNTER
I would need to see the patient in an office visit to fill out any forms. Please get forms, and schedule in any green spot after today.       Forms placed on EB RN's desk.

## 2022-09-27 NOTE — TELEPHONE ENCOUNTER
Routed to patient's vascular provider Dr. Boston. Forms faxed to [facility] at [phone].

## 2022-09-27 NOTE — TELEPHONE ENCOUNTER
Please arrange for in-clinic visit with Dr. Boston at next available.    Appt Note:  To discuss forms for workplace accommodations (Forms on EB's desk)

## 2022-09-27 NOTE — TELEPHONE ENCOUNTER
[employer] message sent to patient informing him that his forms can be picked up from the .

## 2022-09-28 ENCOUNTER — TELEPHONE (OUTPATIENT)
Dept: OTHER | Facility: CLINIC | Age: 57
End: 2022-09-28

## 2022-09-28 NOTE — TELEPHONE ENCOUNTER
Forms were received and were placed on 's desk.    Patient needs to have an appointment with Dr. Boston in order for these to be filled out.    I called, left a voice message letting Good know that he will need an appointment.    Requested he call to schedule.    Dasia Rivera RN BSN  [facility]  [phone]      
Patient returned phone call.     Stated he will wait till his follow up in November to get forms filled instead of coming in for another appointment.   
[facility]    Who is the name of the provider?:  Darvin   What is the location you see this provider at/preferred location?: [place]   Person calling: Patient   Phone number: [phone]   Nurse call back needed: YES  Reason for call:       Pt states his primary  Provider office should have faxed paperwork for job accomodation for pt that needs to be signed by Dr. Boston . If the paperwork have not been received Pt is willing come and drop it off.     If further explanation is needed RN can call pt and Pt also willing to write a letter.     [place]:  [place]   Outside Imaging:[place]   Can we leave a detailed message on this number?  Yes       
911 or go to the nearest Emergency Room

## 2022-09-30 RX ORDER — BISACODYL 5 MG
TABLET, DELAYED RELEASE (ENTERIC COATED) ORAL
Qty: 4 TABLET | Refills: 0 | Status: SHIPPED | OUTPATIENT
Start: 2022-09-30 | End: 2023-11-30

## 2022-10-15 ENCOUNTER — HEALTH MAINTENANCE LETTER (OUTPATIENT)
Age: 57
End: 2022-10-15

## 2022-10-19 ENCOUNTER — HOSPITAL ENCOUNTER (OUTPATIENT)
Facility: CLINIC | Age: 57
Discharge: HOME | End: 2022-10-19
Attending: INTERNAL MEDICINE | Admitting: INTERNAL MEDICINE
Payer: COMMERCIAL

## 2022-10-19 VITALS
HEART RATE: 54 BPM | OXYGEN SATURATION: 98 % | BODY MASS INDEX: 25.2 KG/M2 | WEIGHT: 180 LBS | DIASTOLIC BLOOD PRESSURE: 82 MMHG | SYSTOLIC BLOOD PRESSURE: 123 MMHG | HEIGHT: 71 IN | TEMPERATURE: 97.7 F | RESPIRATION RATE: 16 BRPM

## 2022-10-19 DIAGNOSIS — Z12.11 COLON CANCER SCREENING: Primary | ICD-10-CM

## 2022-10-19 LAB — COLONOSCOPY: NORMAL

## 2022-10-19 PROCEDURE — 250N000011 HC RX IP 250 OP 636: Performed by: INTERNAL MEDICINE

## 2022-10-19 PROCEDURE — 45378 DIAGNOSTIC COLONOSCOPY: CPT | Performed by: INTERNAL MEDICINE

## 2022-10-19 PROCEDURE — G0500 MOD SEDAT ENDO SERVICE >5YRS: HCPCS | Performed by: INTERNAL MEDICINE

## 2022-10-19 PROCEDURE — G0121 COLON CA SCRN NOT HI RSK IND: HCPCS | Performed by: INTERNAL MEDICINE

## 2022-10-19 RX ORDER — ONDANSETRON 4 MG/1
4 TABLET, ORALLY DISINTEGRATING ORAL EVERY 6 HOURS PRN
Status: DISCONTINUED | OUTPATIENT
Start: 2022-10-19 | End: 2022-10-19 | Stop reason: HOSPADM

## 2022-10-19 RX ORDER — NALOXONE HYDROCHLORIDE 0.4 MG/ML
0.4 INJECTION, SOLUTION INTRAMUSCULAR; INTRAVENOUS; SUBCUTANEOUS
Status: DISCONTINUED | OUTPATIENT
Start: 2022-10-19 | End: 2022-10-19 | Stop reason: HOSPADM

## 2022-10-19 RX ORDER — ONDANSETRON 2 MG/ML
4 INJECTION INTRAMUSCULAR; INTRAVENOUS EVERY 6 HOURS PRN
Status: DISCONTINUED | OUTPATIENT
Start: 2022-10-19 | End: 2022-10-19 | Stop reason: HOSPADM

## 2022-10-19 RX ORDER — FENTANYL CITRATE 0.05 MG/ML
50-100 INJECTION, SOLUTION INTRAMUSCULAR; INTRAVENOUS EVERY 5 MIN PRN
Status: DISCONTINUED | OUTPATIENT
Start: 2022-10-19 | End: 2022-10-19 | Stop reason: HOSPADM

## 2022-10-19 RX ORDER — EPINEPHRINE 1 MG/ML
0.1 INJECTION, SOLUTION INTRAMUSCULAR; SUBCUTANEOUS
Status: DISCONTINUED | OUTPATIENT
Start: 2022-10-19 | End: 2022-10-19 | Stop reason: HOSPADM

## 2022-10-19 RX ORDER — DIPHENHYDRAMINE HYDROCHLORIDE 50 MG/ML
25-50 INJECTION INTRAMUSCULAR; INTRAVENOUS
Status: DISCONTINUED | OUTPATIENT
Start: 2022-10-19 | End: 2022-10-19 | Stop reason: HOSPADM

## 2022-10-19 RX ORDER — SIMETHICONE 40MG/0.6ML
133 SUSPENSION, DROPS(FINAL DOSAGE FORM)(ML) ORAL
Status: DISCONTINUED | OUTPATIENT
Start: 2022-10-19 | End: 2022-10-19 | Stop reason: HOSPADM

## 2022-10-19 RX ORDER — ONDANSETRON 2 MG/ML
4 INJECTION INTRAMUSCULAR; INTRAVENOUS
Status: DISCONTINUED | OUTPATIENT
Start: 2022-10-19 | End: 2022-10-19 | Stop reason: HOSPADM

## 2022-10-19 RX ORDER — FLUMAZENIL 0.1 MG/ML
0.2 INJECTION, SOLUTION INTRAVENOUS
Status: DISCONTINUED | OUTPATIENT
Start: 2022-10-19 | End: 2022-10-19 | Stop reason: HOSPADM

## 2022-10-19 RX ORDER — LIDOCAINE 40 MG/G
CREAM TOPICAL
Status: DISCONTINUED | OUTPATIENT
Start: 2022-10-19 | End: 2022-10-19 | Stop reason: HOSPADM

## 2022-10-19 RX ORDER — PROCHLORPERAZINE MALEATE 10 MG
10 TABLET ORAL EVERY 6 HOURS PRN
Status: DISCONTINUED | OUTPATIENT
Start: 2022-10-19 | End: 2022-10-19 | Stop reason: HOSPADM

## 2022-10-19 RX ORDER — NALOXONE HYDROCHLORIDE 0.4 MG/ML
0.2 INJECTION, SOLUTION INTRAMUSCULAR; INTRAVENOUS; SUBCUTANEOUS
Status: DISCONTINUED | OUTPATIENT
Start: 2022-10-19 | End: 2022-10-19 | Stop reason: HOSPADM

## 2022-10-19 RX ORDER — ATROPINE SULFATE 0.1 MG/ML
1 INJECTION INTRAVENOUS
Status: DISCONTINUED | OUTPATIENT
Start: 2022-10-19 | End: 2022-10-19 | Stop reason: HOSPADM

## 2022-10-19 RX ADMIN — FENTANYL CITRATE 100 MCG: 50 INJECTION INTRAMUSCULAR; INTRAVENOUS at 13:42

## 2022-10-19 RX ADMIN — MIDAZOLAM HYDROCHLORIDE 2 MG: 1 INJECTION, SOLUTION INTRAMUSCULAR; INTRAVENOUS at 13:42

## 2022-10-19 ASSESSMENT — ACTIVITIES OF DAILY LIVING (ADL): ADLS_ACUITY_SCORE: 35

## 2022-10-19 NOTE — H&P
Pre-Endoscopy History and Physical     Good Petty MRN# [id number]   YOB: 1965 Age: 57 year old     Date of Procedure: 10/19/2022  Primary care provider: Radha Trotter  Type of Endoscopy: Colonoscopy with possible biopsy, possible polypectomy  Reason for Procedure: screen  Type of Anesthesia Anticipated: Conscious Sedation    HPI:    Good is a 57 year old male who will be undergoing the above procedure.      A history and physical has been performed. The patient's medications and allergies have been reviewed. The risks and benefits of the procedure and the sedation options and risks were discussed with the patient.  All questions were answered and informed consent was obtained.      He denies a personal or family history of anesthesia complications or bleeding disorders.     Patient Active Problem List   Diagnosis     ICH (intracerebral hemorrhage) (H)     Stroke (H)     Essential hypertension     Prediabetes     Mixed hyperlipidemia     Acute deep vein thrombosis (DVT) of distal vein of right lower extremity (H)        Past Medical History:   Diagnosis Date     Anxiety      Prediabetes      Shoulder capsulitis, right         Past Surgical History:   Procedure Laterality Date     HC TOOTH EXTRACTION W/FORCEP       VASECTOMY         Social History     Tobacco Use     Smoking status: Never     Smokeless tobacco: Never   Substance Use Topics     Alcohol use: Never       Family History   Problem Relation Age of Onset     Cerebrovascular Disease Maternal Grandmother        Prior to Admission medications    Medication Sig Start Date End Date Taking? Authorizing Provider   bisacodyl (DULCOLAX) 5 MG EC tablet Take 2 tablets at 3 pm the day before your procedure. If your procedure is before 11 am, take 2 additional tablets at 8 pm. If your procedure is after 11 am, take 2 additional tablets at 6 am. For additional instructions refer to your colonoscopy prep instructions. 9/30/22  Yes  "David Pearson MD   polyethylene glycol (GOLYTELY) 236 g suspension The night before the exam at 6 pm drink an 8-ounce glass every 15 minutes until the jug is half empty. If you arrive before 11 AM: Drink the other half of the Golytely jug at 11 PM night before procedure. If you arrive after 11 AM: Drink the other half of the Golytely jug at 6 AM day of procedure. For additional instructions refer to your colonoscopy prep instructions. 9/30/22  Yes David Pearson MD   amLODIPine (NORVASC) 2.5 MG tablet Take 1 tablet (2.5 mg) by mouth daily 9/6/22   Radha Trotter MD   latanoprost (XALATAN) 0.005 % ophthalmic solution Place 1 drop into both eyes daily 1/20/20   Jim Guzman MD   simvastatin (ZOCOR) 20 MG tablet TAKE 1 TABLET(20 MG) BY MOUTH EVERY EVENING 9/6/22   Radha Trotter MD   XARELTO ANTICOAGULANT 20 MG TABS tablet TAKE 1 TABLET BY MOUTH EVERY DAY WITH DINNER 9/8/22   Willem Boston MD       No Known Allergies     REVIEW OF SYSTEMS:   5 point ROS negative except as noted above in HPI, including Gen., Resp., CV, GI &  system review.    PHYSICAL EXAM:   There were no vitals taken for this visit. Estimated body mass index is 25.17 kg/m  as calculated from the following:    Height as of 9/6/22: 1.803 m (5' 11\").    Weight as of 9/6/22: 81.9 kg (180 lb 8 oz).   GENERAL APPEARANCE: alert, and oriented  MENTAL STATUS: alert  AIRWAY EXAM: Mallampatti Class I (visualization of the soft palate, fauces, uvula, anterior and posterior pillars)  RESP: lungs clear to auscultation - no rales, rhonchi or wheezes  CV: regular rates and rhythm  DIAGNOSTICS:    Not indicated    IMPRESSION   ASA Class 2 - Mild systemic disease    PLAN:   Plan for Colonoscopy with possible biopsy, possible polypectomy. We discussed the risks, benefits and alternatives and the patient wished to proceed.    The above has been forwarded to the consulting provider.      Signed Electronically by: " David Pearson MD  October 19, 2022

## 2022-11-23 ENCOUNTER — HOSPITAL ENCOUNTER (OUTPATIENT)
Dept: ULTRASOUND IMAGING | Facility: CLINIC | Age: 57
Discharge: HOME | End: 2022-11-23
Attending: INTERNAL MEDICINE | Admitting: INTERNAL MEDICINE
Payer: COMMERCIAL

## 2022-11-23 ENCOUNTER — LAB (OUTPATIENT)
Dept: LAB | Facility: CLINIC | Age: 57
End: 2022-11-23
Payer: COMMERCIAL

## 2022-11-23 DIAGNOSIS — I82.4Z1 ACUTE DEEP VEIN THROMBOSIS (DVT) OF DISTAL VEIN OF RIGHT LOWER EXTREMITY (H): ICD-10-CM

## 2022-11-23 LAB — D DIMER PPP FEU-MCNC: <0.27 UG/ML FEU (ref 0–0.5)

## 2022-11-23 PROCEDURE — 93971 EXTREMITY STUDY: CPT | Mod: RT

## 2022-11-23 PROCEDURE — 85379 FIBRIN DEGRADATION QUANT: CPT

## 2022-11-23 PROCEDURE — 36415 COLL VENOUS BLD VENIPUNCTURE: CPT

## 2022-11-28 ENCOUNTER — OFFICE VISIT (OUTPATIENT)
Dept: OTHER | Facility: CLINIC | Age: 57
End: 2022-11-28
Attending: INTERNAL MEDICINE
Payer: COMMERCIAL

## 2022-11-28 VITALS
HEIGHT: 71 IN | OXYGEN SATURATION: 96 % | WEIGHT: 186.2 LBS | HEART RATE: 69 BPM | BODY MASS INDEX: 26.07 KG/M2 | SYSTOLIC BLOOD PRESSURE: 141 MMHG | DIASTOLIC BLOOD PRESSURE: 72 MMHG

## 2022-11-28 DIAGNOSIS — I82.4Z1 ACUTE DEEP VEIN THROMBOSIS (DVT) OF DISTAL VEIN OF RIGHT LOWER EXTREMITY (H): ICD-10-CM

## 2022-11-28 PROCEDURE — G0463 HOSPITAL OUTPT CLINIC VISIT: HCPCS

## 2022-11-28 PROCEDURE — 99214 OFFICE O/P EST MOD 30 MIN: CPT | Performed by: INTERNAL MEDICINE

## 2022-11-28 NOTE — PROGRESS NOTES
Godo Petty is a 57 year old male who is presenting at the current time to discuss his diagnosi(es) of         Acute deep vein thrombosis (DVT) of distal vein of right lower extremity (H)               HPI:       Good Petty is a 57 year old male with history of intracerebral hemorrhage, stroke, hypertension, hyperlipidemia who presented to the ED 8/4/2020 for evaluation of DVT. Per chart review, the patient was admitted on 01/11/20 and transferred to the [facility] for acute to subacute intraparenchymal hematoma in the left paracentral lobule of unclear etiology on 01/15. He reported in the ED on 8/4/2020 that  he had some RLE swelling with limping for the prior 2 days. He reported that when he used it for walking he had leg pain but that laying/sitting down on the bed his pain was better almost gone. He had duplex as below notably positive for extensive RLE DVT form the SFV to the ankle.      His records were reviewed given that he had an intra cranial hemorrhage for an undetermined reason in January, 2020.  Originally the ED MD was hesitant to anticoagulate him. However, a head CT was unremarkable, and as such the decision was made to therapeutically AC him.  Lovenox and warfarin were chosen out of concern for reversibility.      He then represented to the ED 8/14/2020 with right leg pain. 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 He states he didn't get out of the car as much as he should have. He noted he had some pain in his right leg, which he rated as a 9/10 the night of 8/13 which decreased to a 2/10 by 8/14/2020.  U/S was repeated, which revealed improvement in thrombus.      He was seen by Dr. Nieto in August, who performed a HC w/u upon him. The patietn is not a Leiden or a prothrombin mutant. He has no APLAs. Protein C and S, ATIII were not drawn as he had acute thrombus. D dimer has now normalized. He was  switched to Xarelto and is tolerating that well without bleeding or bruising.      He has been ACd now for 21 months (at 10 mg daily for the last 12 months) and thrombus has remained. It now appears to be almost entirely occlusive, whereas six months ago it was partially occlusive. His d dimer remains normal. He has no sxs of impaired venous flow return up the leg, and is tolerating AC w/o bleeding, bruising, etc.              He presents today to consider cessation of AC. His d diemr remains undetectable. Nonocclusive (as opposed to previously occlusive)  DVT in the distal femoral and popliteal veins,         in similar distribution to previous exam. No suggestion of acute occlusive DVT.        Review Of Systems  Skin: negative  Eyes: negative  Ears/Nose/Throat: negative  Respiratory: No shortness of breath, dyspnea on exertion, cough, or hemoptysis  Cardiovascular: negative  Gastrointestinal: negative  Genitourinary: negative  Musculoskeletal: negative  Neurologic: negative  Psychiatric: negative  Hematologic/Lymphatic/Immunologic: negative  Endocrine: negative        PAST MEDICAL HISTORY:                  Past Medical History        Past Medical History:   Diagnosis Date     Anxiety       Prediabetes       Shoulder capsulitis, right              PAST SURGICAL HISTORY:                  Past Surgical History         Past Surgical History:   Procedure Laterality Date     HC TOOTH EXTRACTION W/FORCEP         VASECTOMY                CURRENT MEDICATIONS:                  Current Outpatient Prescriptions          Current Outpatient Medications   Medication Sig Dispense Refill     amLODIPine (NORVASC) 2.5 MG tablet Take 1 tablet (2.5 mg) by mouth daily 30 tablet 0     latanoprost (XALATAN) 0.005 % ophthalmic solution Place 1 drop into both eyes daily 1 Bottle 0     rivaroxaban ANTICOAGULANT (XARELTO ANTICOAGULANT) 20 MG TABS tablet Take 1 tablet (20 mg) by mouth daily (with dinner) 90 tablet 3     simvastatin (ZOCOR) 20  MG tablet TAKE 1 TABLET(20 MG) BY MOUTH EVERY EVENING 90 tablet 3            ALLERGIES:                No Known Allergies     SOCIAL HISTORY:                  Social History   Social History            Socioeconomic History     Marital status:        Spouse name: Not on file     Number of children: Not on file     Years of education: Not on file     Highest education level: Not on file   Occupational History     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   Tobacco Use     Smoking status: Never Smoker     Smokeless tobacco: Never Used   Substance and Sexual Activity     Alcohol use: Never     Drug use: Never     Sexual activity: Yes       Partners: Female   Other Topics Concern     Not on file   Social History Narrative     Not on file      Social Determinants of Health      Financial Resource Strain: Not on file   Food Insecurity: Not on file   Transportation Needs: Not on file   Physical Activity: Not on file   Stress: Not on file   Social Connections: Not on file   Intimate Partner Violence: Not on file   Housing Stability: Not on file            FAMILY HISTORY:                   Family History         Family History   Problem Relation Age of Onset     Cerebrovascular Disease Maternal Grandmother              Physical exam Reveals:     O/P: WNL  HEENT: WNL  NECK: No JVD, thyromegaly, or lymphadenopathy  HEART: RRR, no murmurs, gallops, or rubs  LUNGS: CTA bilaterally without rales, wheezes, or rhonchi  GI: NABS, nondistended, nontender, soft  EXT:without cyanosis, clubbing, or edema  NEURO: nonfocal  : no flank tenderness           Component      Latest Ref Rng & Units 8/18/2020 11/27/2020 2/12/2021 8/20/2021   D-Dimer      0.0 - 0.50 ug/ml FEU 0.7 (H) <0.3 <0.3     D-Dimer Quantitative      0.00 - 0.50 ug/mL FEU       <0.27      Component      Latest Ref Rng & Units 5/12/2022   D-Dimer Quantitative      0.00 - 0.50 ug/mL FEU <0.27      Component      Latest Ref Rng & Units 11/23/2022   D-Dimer  Quantitative      0.00 - 0.50 ug/mL FEU <0.27        VENOUS ULTRASOUND RIGHT LOWER EXTREMITY  5/16/2022 2:35 PM      HISTORY: Acute deep vein thrombosis (DVT) of distal vein of right  lower extremity (H).     COMPARISON: August 26, 2021     TECHNIQUE: Color Doppler and spectral waveform analysis performed  throughout the deep veins of the right lower extremity.     FINDINGS: The right external iliac, common femoral, proximal greater  saphenous, and proximal mid segments of the femoral vein demonstrated  normal blood flow, compression, and augmentation. Nonocclusive  thrombus of the distal femoral vein, extending into the popliteal  vein. Posterior tibial and peroneal veins are compressible.  Contralateral left common femoral vein is patent.                                                                      IMPRESSION: Nonocclusive DVT in the distal femoral and popliteal  veins, likely similar distribution to previous exam. No suggestion of  acute occlusive DVT.     US RIGHT LOWER EXTREMITY VENOUS DUPLEX ULTRASOUND  2/12/2021 3:00 PM     CLINICAL HISTORY/INDICATION: Acute deep vein thrombosis (DVT) of  distal vein of right lower extremity (H).     COMPARISON: 11/2/2020     TECHNIQUE:   Grayscale, color-flow, and spectral waveform analysis were performed  of the deep veins of the right lower extremity     FINDINGS:   Nonocclusive deep vein thrombosis in the right popliteal and one of  the paired posterior tibial veins, unchanged. The right common right  external iliac, common femoral, femoral and peroneal veins are  compressible with spectral waveform color flow and augmentation.      The left external iliac vein was evaluated for comparison and is  normal.                                                                      IMPRESSION:   Nonocclusive chronic-appearing thrombus in the right popliteal and one  of the posterior tibial veins, not significantly changed when compared  to the prior study.     OK  DO QUIN             VENOUS ULTRASOUND RIGHT LOWER EXTREMITY  5/16/2022 2:35 PM      HISTORY: Acute deep vein thrombosis (DVT) of distal vein of right  lower extremity (H).     COMPARISON: August 26, 2021     TECHNIQUE: Color Doppler and spectral waveform analysis performed  throughout the deep veins of the right lower extremity.     FINDINGS: The right external iliac, common femoral, proximal greater  saphenous, and proximal mid segments of the femoral vein demonstrated  normal blood flow, compression, and augmentation. Nonocclusive  thrombus of the distal femoral vein, extending into the popliteal  vein. Posterior tibial and peroneal veins are compressible.  Contralateral left common femoral vein is patent.                                                                      IMPRESSION: Nonocclusive DVT in the distal femoral and popliteal  veins, likely similar distribution to previous exam. No suggestion of  acute occlusive DVT.     JENNYFER HUBBARD MD         [facility]  LOCATION: [facility]  DATE: 11/23/2022     EXAM: RIGHT LOWER EXTREMITY VENOUS ULTRASOUND     INDICATION: Deep vein thrombosis right lower extremity, follow-up.      TECHNIQUE: Duplex imaging was performed utilizing gray-scale,  compression, augmentation as appropriate, color-flow, Doppler waveform  analysis, and spectral Doppler imaging.     COMPARISON: 5/16/2022 right lower extremity venous duplex.     FINDINGS: The right common femoral, profunda femoral, femoral,  popliteal, and segmentally visualized calf veins are patent and  compressible with appropriate vascular waveforms. Nearly occlusive  deep vein thrombosis again seen and distal femoral vein and popliteal  vein, similar to previous.     For comparison, the left common femoral vein is patent and  compressible with normal venous waveforms.                                                                      IMPRESSION:   1.  RIGHT LEG: Stable appearance to  localized DVT at the distal  femoral vein and popliteal vein..     LUKAS GREER MD      A/P:        (I82.4Z1) First lifetime VTE presenting w/o PE as RLE SFV to ankle DVT provoked in association with work as an [employer] .      Comment: Improving with therapeutic AC, provoked vocationally in association with driving as above. His d dimer is normal. His thrombus is improved from originally, but is unchanged since his last imaging. He continues to work as an [employer] , thereby placing him at increased risk of recurrence. When seen on 2/23/2021, we maintained him  on AC but reduced the dose from 20 mg daily to 10 mg daily consistent with the drug's labelling for chronic treatment of DVT. On that dose a previously nonocclusive DVT became occlusive. We increased the dose back to 20 mg daily.  He has been ACd now for 27 months (at 10 mg daily for six of those months) and thrombus has remained. It now appears to be partially occlusive, whereas 12 months ago it was occlusive. His d dimer remains normal. He has sxs of impaired venous flow return up the leg. With swelling and tightness of the RLE, and is tolerating AC w/o bleeding, bruising, etc.      Recent repeat d dimer and RLE duplex are unchanged from previously. I doubt continued AC will provide ongoing benefit. Undertake cessation of AC at this time based upon above results. As  D dimer still normal but thrombus unchanged, this implies the clot has reendothelialized, and as such , I would favor cessation of AC presently. He however just picked up a three months supply. Therefore, he will stop this when he runs out, and  Check a d dimer one month later (four months from now) off of AC. If increases dramatically, consider continued AC. RTC one month.     30 minutes total medical care on today's date.

## 2022-11-28 NOTE — PROGRESS NOTES
"Patient is here to discuss follow up.    BP (!) 141/72 (BP Location: Right arm, Patient Position: Chair, Cuff Size: Adult Regular)   Pulse 69   Ht 5' 11\" (1.803 m)   Wt 186 lb 3.2 oz (84.5 kg)   SpO2 96%   BMI 25.97 kg/m      Questions patient would like addressed today are: N/A.    Refills are needed: Yes:  Xarelto    Has homecare services and agency name:  [facility]  "

## 2023-03-08 ENCOUNTER — DOCUMENTATION ONLY (OUTPATIENT)
Dept: LAB | Facility: CLINIC | Age: 58
End: 2023-03-08
Payer: COMMERCIAL

## 2023-03-08 DIAGNOSIS — Z86.718 PERSONAL HISTORY OF DVT (DEEP VEIN THROMBOSIS): Primary | ICD-10-CM

## 2023-03-08 NOTE — PROGRESS NOTES
"Patient has lab only appt 3/22/23 for \"lab per Dr. Boston\", no orders in chart.  Please place FUTURE orders in Epic if appropriate.    Thanks,   [facility]    "

## 2023-03-22 ENCOUNTER — LAB (OUTPATIENT)
Dept: LAB | Facility: CLINIC | Age: 58
End: 2023-03-22
Payer: COMMERCIAL

## 2023-03-22 DIAGNOSIS — Z86.718 PERSONAL HISTORY OF DVT (DEEP VEIN THROMBOSIS): ICD-10-CM

## 2023-03-22 LAB — D DIMER PPP FEU-MCNC: <0.27 UG/ML FEU (ref 0–0.5)

## 2023-03-22 PROCEDURE — 36415 COLL VENOUS BLD VENIPUNCTURE: CPT

## 2023-03-22 PROCEDURE — 85379 FIBRIN DEGRADATION QUANT: CPT

## 2023-03-29 ENCOUNTER — OFFICE VISIT (OUTPATIENT)
Dept: OTHER | Facility: CLINIC | Age: 58
End: 2023-03-29
Attending: INTERNAL MEDICINE
Payer: COMMERCIAL

## 2023-03-29 VITALS — DIASTOLIC BLOOD PRESSURE: 79 MMHG | OXYGEN SATURATION: 97 % | SYSTOLIC BLOOD PRESSURE: 117 MMHG | HEART RATE: 64 BPM

## 2023-03-29 DIAGNOSIS — I82.4Z1 ACUTE DEEP VEIN THROMBOSIS (DVT) OF DISTAL VEIN OF RIGHT LOWER EXTREMITY (H): Primary | ICD-10-CM

## 2023-03-29 PROCEDURE — G0463 HOSPITAL OUTPT CLINIC VISIT: HCPCS | Performed by: INTERNAL MEDICINE

## 2023-03-29 PROCEDURE — G0463 HOSPITAL OUTPT CLINIC VISIT: HCPCS

## 2023-03-29 PROCEDURE — 99214 OFFICE O/P EST MOD 30 MIN: CPT | Performed by: INTERNAL MEDICINE

## 2023-03-29 NOTE — PROGRESS NOTES
Patient is here to discuss follow up    /79 (BP Location: Right arm, Patient Position: Chair, Cuff Size: Adult Regular)   Pulse 64   SpO2 97%     Questions patient would like addressed today are: haven't taken Xeralto in 6 days    Refills are needed: No    Has homecare services and agency name:  [facility]

## 2023-03-29 NOTE — PROGRESS NOTES
Good Petty is a 57 year old male who is presenting at the current time to discuss his diagnosi(es) of         Acute deep vein thrombosis (DVT) of distal vein of right lower extremity (H)               HPI:       Good Petty is a 57 year old male with history of intracerebral hemorrhage, stroke, hypertension, hyperlipidemia who presented to the ED 8/4/2020 for evaluation of DVT. Per chart review, the patient was admitted on 01/11/20 and transferred to the [facility] for acute to subacute intraparenchymal hematoma in the left paracentral lobule of unclear etiology on 01/15. He reported in the ED on 8/4/2020 that  he had some RLE swelling with limping for the prior 2 days. He reported that when he used it for walking he had leg pain but that laying/sitting down on the bed his pain was better almost gone. He had duplex as below notably positive for extensive RLE DVT form the SFV to the ankle.      His records were reviewed given that he had an intra cranial hemorrhage for an undetermined reason in January, 2020.  Originally the ED MD was hesitant to anticoagulate him. However, a head CT was unremarkable, and as such the decision was made to therapeutically AC him.  Lovenox and warfarin were chosen out of concern for reversibility.      He then represented to the ED 8/14/2020 with right leg pain. 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 He states he didn't get out of the car as much as he should have. He noted he had some pain in his right leg, which he rated as a 9/10 the night of 8/13 which decreased to a 2/10 by 8/14/2020.  U/S was repeated, which revealed improvement in thrombus.      He was seen by Dr. Nieto in August, who performed a HC w/u upon him. The patietn is not a Leiden or a prothrombin mutant. He has no APLAs. Protein C and S, ATIII were not drawn as he had acute thrombus. D dimer has now normalized. He was  switched to Xarelto and is tolerating that well without bleeding or bruising.      He has been ACd now for 21 months (at 10 mg daily for the last 12 months) and thrombus has remained. It now appears to be almost entirely occlusive, whereas six months ago it was partially occlusive. His d dimer remains normal. He has no sxs of impaired venous flow return up the leg, and is tolerating AC w/o bleeding, bruising, etc.              He presents today to consider cessation of AC. His d diemr remains undetectable. Nonocclusive (as opposed to previously occlusive)  DVT in the distal femoral and popliteal veins,         in similar distribution to previous exam. No suggestion of acute occlusive DVT.        Review Of Systems  Skin: negative  Eyes: negative  Ears/Nose/Throat: negative  Respiratory: No shortness of breath, dyspnea on exertion, cough, or hemoptysis  Cardiovascular: negative  Gastrointestinal: negative  Genitourinary: negative  Musculoskeletal: negative  Neurologic: negative  Psychiatric: negative  Hematologic/Lymphatic/Immunologic: negative  Endocrine: negative        PAST MEDICAL HISTORY:                  Past Medical History           Past Medical History:   Diagnosis Date     Anxiety       Prediabetes       Shoulder capsulitis, right              PAST SURGICAL HISTORY:                  Past Surgical History             Past Surgical History:   Procedure Laterality Date     HC TOOTH EXTRACTION W/FORCEP         VASECTOMY                CURRENT MEDICATIONS:                  Current Outpatient Prescriptions               Current Outpatient Medications   Medication Sig Dispense Refill     amLODIPine (NORVASC) 2.5 MG tablet Take 1 tablet (2.5 mg) by mouth daily 30 tablet 0     latanoprost (XALATAN) 0.005 % ophthalmic solution Place 1 drop into both eyes daily 1 Bottle 0     rivaroxaban ANTICOAGULANT (XARELTO ANTICOAGULANT) 20 MG TABS tablet Take 1 tablet (20 mg) by mouth daily (with dinner) 90 tablet 3      simvastatin (ZOCOR) 20 MG tablet TAKE 1 TABLET(20 MG) BY MOUTH EVERY EVENING 90 tablet 3            ALLERGIES:                No Known Allergies     SOCIAL HISTORY:                  Social History   Social History                Socioeconomic History     Marital status:        Spouse name: Not on file     Number of children: Not on file     Years of education: Not on file     Highest education level: Not on file   Occupational History     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   Tobacco Use     Smoking status: Never Smoker     Smokeless tobacco: Never Used   Substance and Sexual Activity     Alcohol use: Never     Drug use: Never     Sexual activity: Yes       Partners: Female   Other Topics Concern     Not on file   Social History Narrative     Not on file      Social Determinants of Health      Financial Resource Strain: Not on file   Food Insecurity: Not on file   Transportation Needs: Not on file   Physical Activity: Not on file   Stress: Not on file   Social Connections: Not on file   Intimate Partner Violence: Not on file   Housing Stability: Not on file            FAMILY HISTORY:                   Family History             Family History   Problem Relation Age of Onset     Cerebrovascular Disease Maternal Grandmother              Physical exam Reveals:     O/P: WNL  HEENT: WNL  NECK: No JVD, thyromegaly, or lymphadenopathy  HEART: RRR, no murmurs, gallops, or rubs  LUNGS: CTA bilaterally without rales, wheezes, or rhonchi  GI: NABS, nondistended, nontender, soft  EXT:without cyanosis, clubbing, or edema  NEURO: nonfocal  : no flank tenderness           Component      Latest Ref Rng & Units 8/18/2020 11/27/2020 2/12/2021 8/20/2021   D-Dimer      0.0 - 0.50 ug/ml FEU 0.7 (H) <0.3 <0.3     D-Dimer Quantitative      0.00 - 0.50 ug/mL FEU       <0.27      Component      Latest Ref Rng & Units 5/12/2022   D-Dimer Quantitative      0.00 - 0.50 ug/mL FEU <0.27      Component      Latest Ref Rng &  Units 11/23/2022   D-Dimer Quantitative      0.00 - 0.50 ug/mL FEU <0.27         Component      Latest Ref Rng & Units 3/22/2023   D-Dimer Quantitative      0.00 - 0.50 ug/mL FEU <0.27         VENOUS ULTRASOUND RIGHT LOWER EXTREMITY  5/16/2022 2:35 PM      HISTORY: Acute deep vein thrombosis (DVT) of distal vein of right  lower extremity (H).     COMPARISON: August 26, 2021     TECHNIQUE: Color Doppler and spectral waveform analysis performed  throughout the deep veins of the right lower extremity.     FINDINGS: The right external iliac, common femoral, proximal greater  saphenous, and proximal mid segments of the femoral vein demonstrated  normal blood flow, compression, and augmentation. Nonocclusive  thrombus of the distal femoral vein, extending into the popliteal  vein. Posterior tibial and peroneal veins are compressible.  Contralateral left common femoral vein is patent.                                                                      IMPRESSION: Nonocclusive DVT in the distal femoral and popliteal  veins, likely similar distribution to previous exam. No suggestion of  acute occlusive DVT.     US RIGHT LOWER EXTREMITY VENOUS DUPLEX ULTRASOUND  2/12/2021 3:00 PM     CLINICAL HISTORY/INDICATION: Acute deep vein thrombosis (DVT) of  distal vein of right lower extremity (H).     COMPARISON: 11/2/2020     TECHNIQUE:   Grayscale, color-flow, and spectral waveform analysis were performed  of the deep veins of the right lower extremity     FINDINGS:   Nonocclusive deep vein thrombosis in the right popliteal and one of  the paired posterior tibial veins, unchanged. The right common right  external iliac, common femoral, femoral and peroneal veins are  compressible with spectral waveform color flow and augmentation.      The left external iliac vein was evaluated for comparison and is  normal.                                                                      IMPRESSION:   Nonocclusive chronic-appearing thrombus  in the right popliteal and one  of the posterior tibial veins, not significantly changed when compared  to the prior study.     OK TSAI DO              VENOUS ULTRASOUND RIGHT LOWER EXTREMITY  5/16/2022 2:35 PM      HISTORY: Acute deep vein thrombosis (DVT) of distal vein of right  lower extremity (H).     COMPARISON: August 26, 2021     TECHNIQUE: Color Doppler and spectral waveform analysis performed  throughout the deep veins of the right lower extremity.     FINDINGS: The right external iliac, common femoral, proximal greater  saphenous, and proximal mid segments of the femoral vein demonstrated  normal blood flow, compression, and augmentation. Nonocclusive  thrombus of the distal femoral vein, extending into the popliteal  vein. Posterior tibial and peroneal veins are compressible.  Contralateral left common femoral vein is patent.                                                                      IMPRESSION: Nonocclusive DVT in the distal femoral and popliteal  veins, likely similar distribution to previous exam. No suggestion of  acute occlusive DVT.     JENNYFER HUBBARD MD            [facility]  LOCATION: [facility]  DATE: 11/23/2022     EXAM: RIGHT LOWER EXTREMITY VENOUS ULTRASOUND     INDICATION: Deep vein thrombosis right lower extremity, follow-up.      TECHNIQUE: Duplex imaging was performed utilizing gray-scale,  compression, augmentation as appropriate, color-flow, Doppler waveform  analysis, and spectral Doppler imaging.     COMPARISON: 5/16/2022 right lower extremity venous duplex.     FINDINGS: The right common femoral, profunda femoral, femoral,  popliteal, and segmentally visualized calf veins are patent and  compressible with appropriate vascular waveforms. Nearly occlusive  deep vein thrombosis again seen and distal femoral vein and popliteal  vein, similar to previous.     For comparison, the left common femoral vein is patent and  compressible with normal  venous waveforms.                                                                      IMPRESSION:   1.  RIGHT LEG: Stable appearance to localized DVT at the distal  femoral vein and popliteal vein..     LUKAS GREER MD      A/P:        (I82.4Z1) First lifetime VTE presenting w/o PE as RLE SFV to ankle DVT provoked in association with work as an [employer] .      Comment: Improving with therapeutic AC, provoked vocationally in association with driving as above. His d dimer is normal. His thrombus is improved from originally, but is unchanged since his last imaging. He continues to work as an [employer] , thereby placing him at increased risk of recurrence. When seen on 2/23/2021, we maintained him  on AC but reduced the dose from 20 mg daily to 10 mg daily consistent with the drug's labelling for chronic treatment of DVT. On that dose a previously nonocclusive DVT became occlusive. We increased the dose back to 20 mg daily.  He has been ACd now for 27 months (at 10 mg daily for six of those months) and thrombus has remained. It now appears to be partially occlusive, whereas 12 months ago it was occlusive. His d dimer remains normal. He has sxs of impaired venous flow return up the leg. With swelling and tightness of the RLE, and is tolerating AC w/o bleeding, bruising, etc.      Recent repeat d dimer and RLE duplex are unchanged from previously. I doubt continued AC will provide ongoing benefit. Undertake cessation of AC at this time based upon above results. As  D dimer still normal but thrombus unchanged, this implies the clot has reendothelialized, and as such , I would favor cessation of AC presently. He however just picked up a three months supply. Therefore, he did stop this when he ran out, and did check a d dimer 3/22/23, the day he went off of AC. Check d dimer in one month, if it increases dramatically, consider continued AC. RTC one month.     30 minutes total medical care on today's date.

## 2023-04-06 DIAGNOSIS — I10 ESSENTIAL HYPERTENSION: ICD-10-CM

## 2023-04-10 RX ORDER — AMLODIPINE BESYLATE 2.5 MG/1
TABLET ORAL
Qty: 90 TABLET | Refills: 1 | Status: SHIPPED | OUTPATIENT
Start: 2023-04-10 | End: 2023-10-02

## 2023-04-14 ENCOUNTER — DOCUMENTATION ONLY (OUTPATIENT)
Dept: LAB | Facility: CLINIC | Age: 58
End: 2023-04-14
Payer: COMMERCIAL

## 2023-04-14 DIAGNOSIS — Z86.718 H/O DEEP VENOUS THROMBOSIS: Primary | ICD-10-CM

## 2023-04-28 ENCOUNTER — LAB (OUTPATIENT)
Dept: LAB | Facility: CLINIC | Age: 58
End: 2023-04-28
Payer: COMMERCIAL

## 2023-04-28 DIAGNOSIS — Z86.718 H/O DEEP VENOUS THROMBOSIS: ICD-10-CM

## 2023-04-28 LAB — D DIMER PPP FEU-MCNC: <0.27 UG/ML FEU (ref 0–0.5)

## 2023-04-28 PROCEDURE — 85379 FIBRIN DEGRADATION QUANT: CPT

## 2023-04-28 PROCEDURE — 36415 COLL VENOUS BLD VENIPUNCTURE: CPT

## 2023-05-03 ENCOUNTER — VIRTUAL VISIT (OUTPATIENT)
Dept: OTHER | Facility: CLINIC | Age: 58
End: 2023-05-03
Attending: INTERNAL MEDICINE
Payer: COMMERCIAL

## 2023-05-03 DIAGNOSIS — I82.4Z1 ACUTE DEEP VEIN THROMBOSIS (DVT) OF DISTAL VEIN OF RIGHT LOWER EXTREMITY (H): Primary | ICD-10-CM

## 2023-05-03 PROCEDURE — G0463 HOSPITAL OUTPT CLINIC VISIT: HCPCS | Mod: TEL

## 2023-05-03 PROCEDURE — 99213 OFFICE O/P EST LOW 20 MIN: CPT | Mod: TEL | Performed by: INTERNAL MEDICINE

## 2023-05-03 NOTE — PROGRESS NOTES
Good is a 57 year old who is being evaluated via a billable telephone visit.      What phone number would you like to be contacted at? [phone]  How would you like to obtain your AVS? Josef    Distant Location (provider location):  On-site        Objective         Vitals:  No vitals were obtained today due to virtual visit.    MARIBEL CUEVAS

## 2023-05-03 NOTE — PROGRESS NOTES
Good Petty is a 57 year old male who is presenting at the current time to discuss his diagnosi(es) of         Acute deep vein thrombosis (DVT) of distal vein of right lower extremity (H)               HPI:       Good Petty is a 57 year old male with history of intracerebral hemorrhage, stroke, hypertension, hyperlipidemia who presented to the ED 8/4/2020 for evaluation of DVT. Per chart review, the patient was admitted on 01/11/20 and transferred to the [facility] for acute to subacute intraparenchymal hematoma in the left paracentral lobule of unclear etiology on 01/15. He reported in the ED on 8/4/2020 that  he had some RLE swelling with limping for the prior 2 days. He reported that when he used it for walking he had leg pain but that laying/sitting down on the bed his pain was better almost gone. He had duplex as below notably positive for extensive RLE DVT form the SFV to the ankle.      His records were reviewed given that he had an intra cranial hemorrhage for an undetermined reason in January, 2020.  Originally the ED MD was hesitant to anticoagulate him. However, a head CT was unremarkable, and as such the decision was made to therapeutically AC him.  Lovenox and warfarin were chosen out of concern for reversibility.      He then represented to the ED 8/14/2020 with right leg pain. 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 He states he didn't get out of the car as much as he should have. He noted he had some pain in his right leg, which he rated as a 9/10 the night of 8/13 which decreased to a 2/10 by 8/14/2020.  U/S was repeated, which revealed improvement in thrombus.      He was seen by Dr. Nieto in August, who performed a HC w/u upon him. The patietn is not a Leiden or a prothrombin mutant. He has no APLAs. Protein C and S, ATIII were not drawn as he had acute thrombus. D dimer has now normalized. He was  switched to Xarelto and is tolerating that well without bleeding or bruising.      He has been ACd now for 21 months (at 10 mg daily for the last 12 months) and thrombus has remained. It now appears to be almost entirely occlusive, whereas six months ago it was partially occlusive. His d dimer remains normal. He has no sxs of impaired venous flow return up the leg, and is tolerating AC w/o bleeding, bruising, etc.              He presents today to consider cessation of AC. His d diemr remains undetectable. Nonocclusive (as opposed to previously occlusive)  DVT in the distal femoral and popliteal veins,         in similar distribution to previous exam. No suggestion of acute occlusive DVT.        Review Of Systems  Skin: negative  Eyes: negative  Ears/Nose/Throat: negative  Respiratory: No shortness of breath, dyspnea on exertion, cough, or hemoptysis  Cardiovascular: negative  Gastrointestinal: negative  Genitourinary: negative  Musculoskeletal: negative  Neurologic: negative  Psychiatric: negative  Hematologic/Lymphatic/Immunologic: negative  Endocrine: negative        PAST MEDICAL HISTORY:                  Past Medical History           Past Medical History:   Diagnosis Date     Anxiety       Prediabetes       Shoulder capsulitis, right              PAST SURGICAL HISTORY:                  Past Surgical History             Past Surgical History:   Procedure Laterality Date     HC TOOTH EXTRACTION W/FORCEP         VASECTOMY                CURRENT MEDICATIONS:                  Current Outpatient Prescriptions               Current Outpatient Medications   Medication Sig Dispense Refill     amLODIPine (NORVASC) 2.5 MG tablet Take 1 tablet (2.5 mg) by mouth daily 30 tablet 0     latanoprost (XALATAN) 0.005 % ophthalmic solution Place 1 drop into both eyes daily 1 Bottle 0     rivaroxaban ANTICOAGULANT (XARELTO ANTICOAGULANT) 20 MG TABS tablet Take 1 tablet (20 mg) by mouth daily (with dinner) 90 tablet 3      simvastatin (ZOCOR) 20 MG tablet TAKE 1 TABLET(20 MG) BY MOUTH EVERY EVENING 90 tablet 3            ALLERGIES:                No Known Allergies     SOCIAL HISTORY:                  Social History   Social History                Socioeconomic History     Marital status:        Spouse name: Not on file     Number of children: Not on file     Years of education: Not on file     Highest education level: Not on file   Occupational History     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   Tobacco Use     Smoking status: Never Smoker     Smokeless tobacco: Never Used   Substance and Sexual Activity     Alcohol use: Never     Drug use: Never     Sexual activity: Yes       Partners: Female   Other Topics Concern     Not on file   Social History Narrative     Not on file      Social Determinants of Health      Financial Resource Strain: Not on file   Food Insecurity: Not on file   Transportation Needs: Not on file   Physical Activity: Not on file   Stress: Not on file   Social Connections: Not on file   Intimate Partner Violence: Not on file   Housing Stability: Not on file            FAMILY HISTORY:                   Family History             Family History   Problem Relation Age of Onset     Cerebrovascular Disease Maternal Grandmother              Physical exam Reveals:     O/P: WNL  HEENT: WNL  NECK: No JVD, thyromegaly, or lymphadenopathy  HEART: RRR, no murmurs, gallops, or rubs  LUNGS: CTA bilaterally without rales, wheezes, or rhonchi  GI: NABS, nondistended, nontender, soft  EXT:without cyanosis, clubbing, or edema  NEURO: nonfocal  : no flank tenderness           Component      Latest Ref Rng & Units 8/18/2020 11/27/2020 2/12/2021 8/20/2021   D-Dimer      0.0 - 0.50 ug/ml FEU 0.7 (H) <0.3 <0.3     D-Dimer Quantitative      0.00 - 0.50 ug/mL FEU       <0.27      Component      Latest Ref Rng & Units 5/12/2022   D-Dimer Quantitative      0.00 - 0.50 ug/mL FEU <0.27      Component      Latest Ref Rng &  Units 11/23/2022   D-Dimer Quantitative      0.00 - 0.50 ug/mL FEU <0.27         Component      Latest Ref Rng & Units 3/22/2023   D-Dimer Quantitative      0.00 - 0.50 ug/mL FEU <0.27      Component      Latest Ref Rng 4/28/2023  3:08 PM   D-Dimer Quantitative      0.00 - 0.50 ug/mL FEU <0.27              VENOUS ULTRASOUND RIGHT LOWER EXTREMITY  5/16/2022 2:35 PM      HISTORY: Acute deep vein thrombosis (DVT) of distal vein of right  lower extremity (H).     COMPARISON: August 26, 2021     TECHNIQUE: Color Doppler and spectral waveform analysis performed  throughout the deep veins of the right lower extremity.     FINDINGS: The right external iliac, common femoral, proximal greater  saphenous, and proximal mid segments of the femoral vein demonstrated  normal blood flow, compression, and augmentation. Nonocclusive  thrombus of the distal femoral vein, extending into the popliteal  vein. Posterior tibial and peroneal veins are compressible.  Contralateral left common femoral vein is patent.                                                                      IMPRESSION: Nonocclusive DVT in the distal femoral and popliteal  veins, likely similar distribution to previous exam. No suggestion of  acute occlusive DVT.     US RIGHT LOWER EXTREMITY VENOUS DUPLEX ULTRASOUND  2/12/2021 3:00 PM     CLINICAL HISTORY/INDICATION: Acute deep vein thrombosis (DVT) of  distal vein of right lower extremity (H).     COMPARISON: 11/2/2020     TECHNIQUE:   Grayscale, color-flow, and spectral waveform analysis were performed  of the deep veins of the right lower extremity     FINDINGS:   Nonocclusive deep vein thrombosis in the right popliteal and one of  the paired posterior tibial veins, unchanged. The right common right  external iliac, common femoral, femoral and peroneal veins are  compressible with spectral waveform color flow and augmentation.      The left external iliac vein was evaluated for comparison and  is  normal.                                                                      IMPRESSION:   Nonocclusive chronic-appearing thrombus in the right popliteal and one  of the posterior tibial veins, not significantly changed when compared  to the prior study.     OK TSAI DO              VENOUS ULTRASOUND RIGHT LOWER EXTREMITY  5/16/2022 2:35 PM      HISTORY: Acute deep vein thrombosis (DVT) of distal vein of right  lower extremity (H).     COMPARISON: August 26, 2021     TECHNIQUE: Color Doppler and spectral waveform analysis performed  throughout the deep veins of the right lower extremity.     FINDINGS: The right external iliac, common femoral, proximal greater  saphenous, and proximal mid segments of the femoral vein demonstrated  normal blood flow, compression, and augmentation. Nonocclusive  thrombus of the distal femoral vein, extending into the popliteal  vein. Posterior tibial and peroneal veins are compressible.  Contralateral left common femoral vein is patent.                                                                      IMPRESSION: Nonocclusive DVT in the distal femoral and popliteal  veins, likely similar distribution to previous exam. No suggestion of  acute occlusive DVT.     JENNYFER HUBBARD MD            [facility]  LOCATION: [facility]  DATE: 11/23/2022     EXAM: RIGHT LOWER EXTREMITY VENOUS ULTRASOUND     INDICATION: Deep vein thrombosis right lower extremity, follow-up.      TECHNIQUE: Duplex imaging was performed utilizing gray-scale,  compression, augmentation as appropriate, color-flow, Doppler waveform  analysis, and spectral Doppler imaging.     COMPARISON: 5/16/2022 right lower extremity venous duplex.     FINDINGS: The right common femoral, profunda femoral, femoral,  popliteal, and segmentally visualized calf veins are patent and  compressible with appropriate vascular waveforms. Nearly occlusive  deep vein thrombosis again seen and distal femoral vein  and popliteal  vein, similar to previous.     For comparison, the left common femoral vein is patent and  compressible with normal venous waveforms.                                                                      IMPRESSION:   1.  RIGHT LEG: Stable appearance to localized DVT at the distal  femoral vein and popliteal vein..     LUKAS GREER MD      A/P:        (I82.4Z1) First lifetime VTE presenting w/o PE as RLE SFV to ankle DVT provoked in association with work as an [employer] .      Comment: Improving with therapeutic AC, provoked vocationally in association with driving as above. His d dimer is normal. His thrombus is improved from originally, but is unchanged since his last imaging. He continues to work as an [employer] , thereby placing him at increased risk of recurrence. When seen on 2/23/2021, we maintained him  on AC but reduced the dose from 20 mg daily to 10 mg daily consistent with the drug's labelling for chronic treatment of DVT. On that dose a previously nonocclusive DVT became occlusive. We increased the dose back to 20 mg daily.  He has been ACd now for 27 months (at 10 mg daily for six of those months) and thrombus has remained. It now appears to be partially occlusive, whereas 12 months ago it was occlusive. His d dimer remains normal. He has sxs of impaired venous flow return up the leg. With swelling and tightness of the RLE, and is tolerating AC w/o bleeding, bruising, etc.      Recent repeat d dimer and RLE duplex are unchanged from previously. I doubt continued AC will provide ongoing benefit. Undertake cessation of AC at this time based upon above results. As  D dimer still normal but thrombus unchanged, this implies the clot has reendothelialized, and as such , we stopped AC 3/22/23. We checked a d dimer after off of AC for one month, which on 4/28/23 revealed it was undetectable. He should therefore remain off of AC, and stop checking d dimer values. He is advised to RTC or  RTED for any recurrent sxs suggestive of thrombosis in the future.        23 minutes total medical care on today's date.     MD location: office  Pt location: home    Phone call start: 15:55  Phone call end: 16:05

## 2023-05-15 ENCOUNTER — MYC MEDICAL ADVICE (OUTPATIENT)
Dept: INTERNAL MEDICINE | Facility: CLINIC | Age: 58
End: 2023-05-15
Payer: COMMERCIAL

## 2023-05-23 ENCOUNTER — MYC MEDICAL ADVICE (OUTPATIENT)
Dept: INTERNAL MEDICINE | Facility: CLINIC | Age: 58
End: 2023-05-23
Payer: COMMERCIAL

## 2023-05-25 NOTE — TELEPHONE ENCOUNTER
Completed form received. Patient notified and expressed understanding.  Patient will come  form from  today.  Form placed at .

## 2023-05-25 NOTE — TELEPHONE ENCOUNTER
Patient notified and expressed understanding.  Patient will come  form from  today.  Form placed at .

## 2023-09-30 DIAGNOSIS — I10 ESSENTIAL HYPERTENSION: ICD-10-CM

## 2023-10-02 RX ORDER — AMLODIPINE BESYLATE 2.5 MG/1
2.5 TABLET ORAL DAILY
Qty: 30 TABLET | Refills: 0 | Status: SHIPPED | OUTPATIENT
Start: 2023-10-02 | End: 2023-11-30

## 2023-10-02 NOTE — TELEPHONE ENCOUNTER
I have not seen this patient since more than 1 year, needs appointment to follow-up on blood pressure and med check, patient has not scheduled any future appointments.  Will refill for 1 month only at this time please advise patient to schedule appointment with any provider with opening

## 2023-10-17 ENCOUNTER — TELEPHONE (OUTPATIENT)
Dept: INTERNAL MEDICINE | Facility: CLINIC | Age: 58
End: 2023-10-17
Payer: COMMERCIAL

## 2023-10-17 NOTE — TELEPHONE ENCOUNTER
Reason for Call:  Appointment Request    Patient requesting this type of appt:  Preventive     Requested provider: Radha Trotter    Reason patient unable to be scheduled: Not within requested timeframe    When does patient want to be seen/preferred time:  before 10/31    Comments: meds are low and needs to see pcp or can't get refills    Could we send this information to you in [facility] or would you prefer to receive a phone call?:   Patient would prefer a phone call   Okay to leave a detailed message?: Yes at Cell number on file:    Telephone Information:   Mobile [phone]       Call taken on 10/17/2023 at 12:23 PM by Mariaelena Amin

## 2023-10-28 DIAGNOSIS — I10 ESSENTIAL HYPERTENSION: ICD-10-CM

## 2023-10-29 ENCOUNTER — HEALTH MAINTENANCE LETTER (OUTPATIENT)
Age: 58
End: 2023-10-29

## 2023-10-30 NOTE — TELEPHONE ENCOUNTER
I have not seen this patient since more than 1 year, patient needs in clinic visit, overdue for physical, I can see patient 11/06/23 at 8:30 AM or 11/08/23 at 11 AM or 11/09/23 at 11:30 AM, please inform patient and schedule in 1 of these appointments.

## 2023-11-02 RX ORDER — AMLODIPINE BESYLATE 2.5 MG/1
2.5 TABLET ORAL DAILY
Qty: 90 TABLET | Refills: 1 | OUTPATIENT
Start: 2023-11-02

## 2023-11-02 NOTE — TELEPHONE ENCOUNTER
I have not seen this pt since more than 1 yr, I have already refilled 30 days and cannot refill 90 day refill at this time, can be scheduled in 11/23 or 12/23 on any of my same day or approval slots

## 2023-11-03 ENCOUNTER — TELEPHONE (OUTPATIENT)
Dept: INTERNAL MEDICINE | Facility: CLINIC | Age: 58
End: 2023-11-03
Payer: COMMERCIAL

## 2023-11-03 DIAGNOSIS — I10 ESSENTIAL HYPERTENSION: ICD-10-CM

## 2023-11-03 RX ORDER — AMLODIPINE BESYLATE 2.5 MG/1
2.5 TABLET ORAL DAILY
Qty: 30 TABLET | Refills: 0 | OUTPATIENT
Start: 2023-11-03

## 2023-11-03 NOTE — TELEPHONE ENCOUNTER
Patient calls back, is on the schedule   Appointments in Next Year      Feb 19, 2024  9:30 AM  (Arrive by 9:10 AM)  Adult Preventative Visit with Radha Trotter MD  [facility] ([facility] [place] ) [phone]          The dates Dr. Trotter lists 11/23 and 12/23 the clinic is not open. 11/23 is Thanksgiving and 12/23 is a Saturday.   Patient is wondering what he should do, are there other available dates that would work?

## 2023-11-03 NOTE — TELEPHONE ENCOUNTER
I meant Nov 2023 or Dec 2023- I have same day and approval slots open. Please schedule in any one these months,

## 2023-11-03 NOTE — TELEPHONE ENCOUNTER
Called and left patient a voice mail message on November 3, 2023 2:07 PM to call back the clinic.    Thank you,  Dereck Rey, Triage RN [place]  2:07 PM 11/3/2023

## 2023-11-30 ENCOUNTER — OFFICE VISIT (OUTPATIENT)
Dept: INTERNAL MEDICINE | Facility: CLINIC | Age: 58
End: 2023-11-30
Payer: COMMERCIAL

## 2023-11-30 VITALS
OXYGEN SATURATION: 98 % | WEIGHT: 191.2 LBS | HEART RATE: 68 BPM | RESPIRATION RATE: 15 BRPM | TEMPERATURE: 98.1 F | HEIGHT: 71 IN | DIASTOLIC BLOOD PRESSURE: 80 MMHG | SYSTOLIC BLOOD PRESSURE: 134 MMHG | BODY MASS INDEX: 26.77 KG/M2

## 2023-11-30 DIAGNOSIS — I10 ESSENTIAL HYPERTENSION: ICD-10-CM

## 2023-11-30 DIAGNOSIS — R73.03 PREDIABETES: ICD-10-CM

## 2023-11-30 DIAGNOSIS — I63.9 CEREBROVASCULAR ACCIDENT (CVA), UNSPECIFIED MECHANISM (H): ICD-10-CM

## 2023-11-30 DIAGNOSIS — Z00.00 ROUTINE HISTORY AND PHYSICAL EXAMINATION OF ADULT: Primary | ICD-10-CM

## 2023-11-30 DIAGNOSIS — M62.830 BACK MUSCLE SPASM: ICD-10-CM

## 2023-11-30 DIAGNOSIS — Z12.5 SCREENING FOR PROSTATE CANCER: ICD-10-CM

## 2023-11-30 LAB
ALBUMIN SERPL BCG-MCNC: 4.4 G/DL (ref 3.5–5.2)
ALP SERPL-CCNC: 64 U/L (ref 40–150)
ALT SERPL W P-5'-P-CCNC: 30 U/L (ref 0–70)
ANION GAP SERPL CALCULATED.3IONS-SCNC: 10 MMOL/L (ref 7–15)
AST SERPL W P-5'-P-CCNC: 51 U/L (ref 0–45)
BILIRUB SERPL-MCNC: 0.5 MG/DL
BUN SERPL-MCNC: 9.7 MG/DL (ref 8–23)
CALCIUM SERPL-MCNC: 9.5 MG/DL (ref 8.6–10)
CHLORIDE SERPL-SCNC: 101 MMOL/L (ref 98–107)
CHOLEST SERPL-MCNC: 141 MG/DL
CREAT SERPL-MCNC: 1.03 MG/DL (ref 0.67–1.17)
DEPRECATED HCO3 PLAS-SCNC: 31 MMOL/L (ref 22–29)
EGFRCR SERPLBLD CKD-EPI 2021: 84 ML/MIN/1.73M2
ERYTHROCYTE [DISTWIDTH] IN BLOOD BY AUTOMATED COUNT: 14.3 % (ref 10–15)
GLUCOSE SERPL-MCNC: 100 MG/DL (ref 70–99)
HBA1C MFR BLD: 5.8 % (ref 0–5.6)
HCT VFR BLD AUTO: 47.1 % (ref 40–53)
HDLC SERPL-MCNC: 56 MG/DL
HGB BLD-MCNC: 14.8 G/DL (ref 13.3–17.7)
LDLC SERPL CALC-MCNC: 71 MG/DL
MCH RBC QN AUTO: 26.1 PG (ref 26.5–33)
MCHC RBC AUTO-ENTMCNC: 31.4 G/DL (ref 31.5–36.5)
MCV RBC AUTO: 83 FL (ref 78–100)
NONHDLC SERPL-MCNC: 85 MG/DL
PLATELET # BLD AUTO: 189 10E3/UL (ref 150–450)
POTASSIUM SERPL-SCNC: 4.1 MMOL/L (ref 3.4–5.3)
PROT SERPL-MCNC: 6.8 G/DL (ref 6.4–8.3)
RBC # BLD AUTO: 5.66 10E6/UL (ref 4.4–5.9)
SODIUM SERPL-SCNC: 142 MMOL/L (ref 135–145)
TRIGL SERPL-MCNC: 68 MG/DL
WBC # BLD AUTO: 6 10E3/UL (ref 4–11)

## 2023-11-30 PROCEDURE — G0103 PSA SCREENING: HCPCS | Performed by: INTERNAL MEDICINE

## 2023-11-30 PROCEDURE — 99396 PREV VISIT EST AGE 40-64: CPT | Performed by: INTERNAL MEDICINE

## 2023-11-30 PROCEDURE — 99213 OFFICE O/P EST LOW 20 MIN: CPT | Mod: 25 | Performed by: INTERNAL MEDICINE

## 2023-11-30 PROCEDURE — 83036 HEMOGLOBIN GLYCOSYLATED A1C: CPT | Performed by: INTERNAL MEDICINE

## 2023-11-30 PROCEDURE — 85027 COMPLETE CBC AUTOMATED: CPT | Performed by: INTERNAL MEDICINE

## 2023-11-30 PROCEDURE — 36415 COLL VENOUS BLD VENIPUNCTURE: CPT | Performed by: INTERNAL MEDICINE

## 2023-11-30 PROCEDURE — 80061 LIPID PANEL: CPT | Performed by: INTERNAL MEDICINE

## 2023-11-30 PROCEDURE — 80053 COMPREHEN METABOLIC PANEL: CPT | Performed by: INTERNAL MEDICINE

## 2023-11-30 RX ORDER — TIZANIDINE 2 MG/1
2 TABLET ORAL
Qty: 30 TABLET | Refills: 0 | Status: SHIPPED | OUTPATIENT
Start: 2023-11-30 | End: 2023-12-29

## 2023-11-30 RX ORDER — AMLODIPINE BESYLATE 2.5 MG/1
2.5 TABLET ORAL DAILY
Qty: 90 TABLET | Refills: 1 | Status: SHIPPED | OUTPATIENT
Start: 2023-11-30

## 2023-11-30 RX ORDER — SIMVASTATIN 20 MG
TABLET ORAL
Qty: 90 TABLET | Refills: 1 | Status: SHIPPED | OUTPATIENT
Start: 2023-11-30 | End: 2024-06-10

## 2023-11-30 RX ORDER — ASPIRIN 81 MG/1
81 TABLET ORAL DAILY
COMMUNITY
Start: 2023-11-30

## 2023-11-30 ASSESSMENT — ENCOUNTER SYMPTOMS
SHORTNESS OF BREATH: 0
PARESTHESIAS: 0
MYALGIAS: 0
CONSTIPATION: 0
ARTHRALGIAS: 0
HEADACHES: 0
CHILLS: 0
COUGH: 0
NAUSEA: 0
NERVOUS/ANXIOUS: 0
PALPITATIONS: 0
WEAKNESS: 0
EYE PAIN: 0
DIZZINESS: 0
FREQUENCY: 1
ABDOMINAL PAIN: 0
DYSURIA: 0
SORE THROAT: 0
HEMATOCHEZIA: 0
HEMATURIA: 0
JOINT SWELLING: 0
HEARTBURN: 0
FEVER: 0
DIARRHEA: 0

## 2023-11-30 NOTE — NURSING NOTE
"/80   Pulse 68   Temp 98.1  F (36.7  C) (Tympanic)   Resp 15   Ht 1.803 m (5' 11\")   Wt 86.7 kg (191 lb 3.2 oz)   SpO2 98%   BMI 26.67 kg/m      "

## 2023-11-30 NOTE — PROGRESS NOTES
SUBJECTIVE:   Good is a 58 year old, presenting for the following:  Physical        11/30/2023     2:40 PM   Additional Questions   Roomed by mary   Accompanied by self         11/30/2023     2:40 PM   Patient Reported Additional Medications   Patient reports taking the following new medications none       Healthy Habits:     Getting at least 3 servings of Calcium per day:  Yes    Bi-annual eye exam:  Yes    Dental care twice a year:  Yes    Sleep apnea or symptoms of sleep apnea:  Excessive snoring    Diet:  Regular (no restrictions)    Frequency of exercise:  4-5 days/week    Duration of exercise:  30-45 minutes    Taking medications regularly:  Yes    Medication side effects:  None    Additional concerns today:  Yes      Today's PHQ-2 Score:       11/30/2023     2:38 PM   PHQ-2 ( 1999 [employer])   Q1: Little interest or pleasure in doing things 0   Q2: Feeling down, depressed or hopeless 0   PHQ-2 Score 0   Q1: Little interest or pleasure in doing things Not at all   Q2: Feeling down, depressed or hopeless Not at all   PHQ-2 Score 0        Hyperlipidemia Follow-Up     Are you regularly taking any medication or supplement to lower your cholesterol?   Yes- simvastatin   Are you having muscle aches or other side effects that you think could be caused by your cholesterol lowering medication?  No     Hypertension Follow-up     Do you check your blood pressure regularly outside of the clinic? Yes  Are you following a low salt diet? Yes  Are your blood pressures ever more than 140 on the top number (systolic) OR more       than 90 on the bottom number (diastolic), for example 140/90? No     Cerebrovascular Follow-up     Patient history: hemorrhagic cerebrovascular incident-    Residual symptoms: None, but says rt leg is still not completely baseline ,still has some decreased feeling  Worsened or new symptoms since last visit: No  Daily aspirin use:   xarelto has been stopped by vascular  Hypertension controlled: Yes      H/o Rt LE DVT; off of xarelto per vascular specialist        Patient states that he gets on and off lumbar back strain and has seen orthopedics in the past and was prescribed muscle relaxant and patient is requesting muscle relaxant to take as needed.      Past Medical History:   Diagnosis Date    Anxiety     Prediabetes     Shoulder capsulitis, right        Past Surgical History:   Procedure Laterality Date    COLONOSCOPY N/A 10/19/2022    Procedure: COLONOSCOPY;  Surgeon: David Pearson MD;  Location:  GI    HC TOOTH EXTRACTION W/FORCEP      VASECTOMY         Current Outpatient Medications   Medication Sig Dispense Refill    amLODIPine (NORVASC) 2.5 MG tablet Take 1 tablet (2.5 mg) by mouth daily 90 tablet 1    aspirin 81 MG EC tablet Take 1 tablet (81 mg) by mouth daily      latanoprost (XALATAN) 0.005 % ophthalmic solution Place 1 drop into both eyes daily 1 Bottle 0    simvastatin (ZOCOR) 20 MG tablet TAKE 1 TABLET BY MOUTH EVERY EVENING 90 tablet 1       Family History   Problem Relation Age of Onset    Cerebrovascular Disease Maternal Grandmother     Prostate Cancer Maternal Uncle           Social History     Tobacco Use    Smoking status: Never    Smokeless tobacco: Never   Substance Use Topics    Alcohol use: Never           11/30/2023     2:38 PM   Alcohol Use   Prescreen: >3 drinks/day or >7 drinks/week? Not Applicable         Last PSA:   Prostate Specific Antigen Screen   Date Value Ref Range Status   09/06/2022 0.86 0.00 - 4.00 ug/L Final       Reviewed orders with patient. Reviewed health maintenance and updated orders accordingly - Yes  Lab work is in process    Reviewed and updated as needed this visit by clinical staff   Tobacco                Reviewed and updated as needed this visit by Provider                     Review of Systems   Constitutional:  Negative for chills and fever.   HENT:  Negative for congestion, ear pain, hearing loss and sore throat.    Eyes:  Negative for pain and  "visual disturbance.   Respiratory:  Negative for cough and shortness of breath.    Cardiovascular:  Negative for chest pain, palpitations and peripheral edema.   Gastrointestinal:  Negative for abdominal pain, constipation, diarrhea, heartburn, hematochezia and nausea.   Genitourinary:  Positive for frequency. Negative for dysuria, genital sores, hematuria, impotence, penile discharge and urgency.   Musculoskeletal:  Negative for arthralgias, joint swelling and myalgias.   Skin:  Negative for rash.   Neurological:  Negative for dizziness, weakness, headaches and paresthesias.   Psychiatric/Behavioral:  Negative for mood changes. The patient is not nervous/anxious.      OBJECTIVE:   /80   Pulse 68   Temp 98.1  F (36.7  C) (Tympanic)   Resp 15   Ht 1.803 m (5' 11\")   Wt 86.7 kg (191 lb 3.2 oz)   SpO2 98%   BMI 26.67 kg/m      Physical Exam  GENERAL: healthy, alert and no distress  EYES: Eyes grossly normal to inspection, PERRL and conjunctivae and sclerae normal  HENT: ear canals and TM's normal, nose and mouth without ulcers or lesions  NECK: no adenopathy, no asymmetry, masses, or scars and thyroid normal to palpation  RESP: lungs clear to auscultation - no rales, rhonchi or wheezes  CV: regular rate and rhythm, normal S1 S2,    ABDOMEN: soft, nontender,  and bowel sounds normal  MS: no vertebral or paravertebral tenderness at this time, no gross musculoskeletal defects noted, no edema  NEURO: Normal strength and tone, mentation intact and speech normal  PSYCH: mentation appears normal, affect normal/bright     ASSESSMENT/PLAN:       (Z00.00) Routine history and physical examination of adult  (primary encounter diagnosis)  Plan: CBC with platelets, Comprehensive metabolic         panel          (I10) Essential hypertension  Comment: Blood pressure stable  Plan: amLODIPine (NORVASC) 2.5 MG tablet refilled as directed.explained clearly about the medication,insructions and side effects.            (I63.9) " Cerebrovascular accident (CVA), unspecified mechanism (H)  Plan: Lipid panel reflex to direct LDL Fasting,         simvastatin (ZOCOR) 20 MG tablet refilled as directed            (Z12.5) Screening for prostate cancer  Plan: Prostate Specific Antigen Screen            (R73.03) Prediabetes  Plan: Hemoglobin A1c            (M62.830) Back muscle spasm  Plan: tiZANidine (ZANAFLEX) 2 MG tablet take as directed as needed for muscle spasms.explained clearly about the medication,insructions and side effects. Advised not to drive or operate any machinery while on this med       Patient has been advised of split billing requirements and indicates understanding: Yes      COUNSELING:   Reviewed preventive health counseling, as reflected in patient instructions       Regular exercise       Healthy diet/nutrition    Discussed Shingrix vaccine , pt will check with his insurance       He reports that he has never smoked. He has never used smokeless tobacco.            Radha Trotter MD  [facility]

## 2023-12-01 LAB — PSA SERPL DL<=0.01 NG/ML-MCNC: 1.27 NG/ML (ref 0–3.5)

## 2023-12-24 ENCOUNTER — TELEPHONE (OUTPATIENT)
Dept: INTERNAL MEDICINE | Facility: CLINIC | Age: 58
End: 2023-12-24
Payer: COMMERCIAL

## 2023-12-24 DIAGNOSIS — M62.830 BACK MUSCLE SPASM: ICD-10-CM

## 2023-12-29 RX ORDER — TIZANIDINE 2 MG/1
TABLET ORAL
Qty: 30 TABLET | Refills: 0 | Status: SHIPPED | OUTPATIENT
Start: 2023-12-29

## 2024-01-11 DIAGNOSIS — M62.830 BACK MUSCLE SPASM: ICD-10-CM

## 2024-01-11 RX ORDER — TIZANIDINE 2 MG/1
TABLET ORAL
Qty: 90 TABLET | Refills: 1 | OUTPATIENT
Start: 2024-01-11

## 2024-06-10 DIAGNOSIS — I63.9 CEREBROVASCULAR ACCIDENT (CVA), UNSPECIFIED MECHANISM (H): ICD-10-CM

## 2024-06-10 RX ORDER — SIMVASTATIN 20 MG
TABLET ORAL
Qty: 15 TABLET | Refills: 0 | Status: SHIPPED | OUTPATIENT
Start: 2024-06-10 | End: 2024-06-24

## 2024-06-11 NOTE — TELEPHONE ENCOUNTER
Patient is overdue for appointment, patient has not made any future appointments, last seen more than 6 months ago.  Please advise patient to be seen in clinic.. Okay to schedule on any of my same-day or approval only slot.  I have refilled for 2 weeks only

## 2024-06-23 DIAGNOSIS — I63.9 CEREBROVASCULAR ACCIDENT (CVA), UNSPECIFIED MECHANISM (H): ICD-10-CM

## 2024-06-24 RX ORDER — SIMVASTATIN 20 MG
TABLET ORAL
Qty: 90 TABLET | Refills: 0 | Status: SHIPPED | OUTPATIENT
Start: 2024-06-24

## (undated) DEVICE — KIT ENDO TURNOVER/PROCEDURE W/CLEAN A SCOPE LINERS 103888

## (undated) RX ORDER — FENTANYL CITRATE 0.05 MG/ML
INJECTION, SOLUTION INTRAMUSCULAR; INTRAVENOUS
Status: DISPENSED
Start: 2022-10-19